# Patient Record
Sex: FEMALE | Race: WHITE | Employment: FULL TIME | URBAN - METROPOLITAN AREA
[De-identification: names, ages, dates, MRNs, and addresses within clinical notes are randomized per-mention and may not be internally consistent; named-entity substitution may affect disease eponyms.]

---

## 2018-06-18 ENCOUNTER — NURSE TRIAGE (OUTPATIENT)
Dept: NURSING | Facility: CLINIC | Age: 28
End: 2018-06-18

## 2018-06-18 ENCOUNTER — PRENATAL OFFICE VISIT (OUTPATIENT)
Dept: OBGYN | Facility: CLINIC | Age: 28
End: 2018-06-18

## 2018-06-18 ENCOUNTER — HOSPITAL ENCOUNTER (EMERGENCY)
Facility: CLINIC | Age: 28
Discharge: HOME | End: 2018-06-19
Attending: FAMILY MEDICINE | Admitting: FAMILY MEDICINE
Payer: COMMERCIAL

## 2018-06-18 ENCOUNTER — APPOINTMENT (OUTPATIENT)
Dept: ULTRASOUND IMAGING | Facility: CLINIC | Age: 28
End: 2018-06-18
Attending: FAMILY MEDICINE
Payer: COMMERCIAL

## 2018-06-18 ENCOUNTER — APPOINTMENT (OUTPATIENT)
Dept: OBGYN | Facility: CLINIC | Age: 28
End: 2018-06-18
Payer: COMMERCIAL

## 2018-06-18 VITALS
SYSTOLIC BLOOD PRESSURE: 128 MMHG | DIASTOLIC BLOOD PRESSURE: 80 MMHG | TEMPERATURE: 97.8 F | RESPIRATION RATE: 18 BRPM | HEART RATE: 88 BPM | OXYGEN SATURATION: 100 %

## 2018-06-18 DIAGNOSIS — O03.9 SPONTANEOUS ABORTION: ICD-10-CM

## 2018-06-18 DIAGNOSIS — Z34.00 PRENATAL CARE, FIRST PREGNANCY: Primary | ICD-10-CM

## 2018-06-18 LAB
ABO + RH BLD: NORMAL
ABO + RH BLD: NORMAL
ANION GAP SERPL CALCULATED.3IONS-SCNC: 7 MMOL/L (ref 3–14)
B-HCG SERPL-ACNC: 91 IU/L (ref 0–5)
BASOPHILS # BLD AUTO: 0 10E9/L (ref 0–0.2)
BASOPHILS NFR BLD AUTO: 0.5 %
BUN SERPL-MCNC: 14 MG/DL (ref 7–30)
CALCIUM SERPL-MCNC: 8.9 MG/DL (ref 8.5–10.1)
CHLORIDE SERPL-SCNC: 105 MMOL/L (ref 94–109)
CO2 SERPL-SCNC: 27 MMOL/L (ref 20–32)
CREAT SERPL-MCNC: 0.83 MG/DL (ref 0.52–1.04)
DIFFERENTIAL METHOD BLD: NORMAL
EOSINOPHIL # BLD AUTO: 0.3 10E9/L (ref 0–0.7)
EOSINOPHIL NFR BLD AUTO: 3.5 %
ERYTHROCYTE [DISTWIDTH] IN BLOOD BY AUTOMATED COUNT: 11.8 % (ref 10–15)
GFR SERPL CREATININE-BSD FRML MDRD: 83 ML/MIN/1.7M2
GLUCOSE SERPL-MCNC: 96 MG/DL (ref 70–99)
HCT VFR BLD AUTO: 41.8 % (ref 35–47)
HGB BLD-MCNC: 14 G/DL (ref 11.7–15.7)
IMM GRANULOCYTES # BLD: 0 10E9/L (ref 0–0.4)
IMM GRANULOCYTES NFR BLD: 0.1 %
LYMPHOCYTES # BLD AUTO: 2.7 10E9/L (ref 0.8–5.3)
LYMPHOCYTES NFR BLD AUTO: 34.5 %
MCH RBC QN AUTO: 31.3 PG (ref 26.5–33)
MCHC RBC AUTO-ENTMCNC: 33.5 G/DL (ref 31.5–36.5)
MCV RBC AUTO: 94 FL (ref 78–100)
MONOCYTES # BLD AUTO: 0.7 10E9/L (ref 0–1.3)
MONOCYTES NFR BLD AUTO: 9.3 %
NEUTROPHILS # BLD AUTO: 4.1 10E9/L (ref 1.6–8.3)
NEUTROPHILS NFR BLD AUTO: 52.1 %
NRBC # BLD AUTO: 0 10*3/UL
NRBC BLD AUTO-RTO: 0 /100
PLATELET # BLD AUTO: 287 10E9/L (ref 150–450)
POTASSIUM SERPL-SCNC: 3.5 MMOL/L (ref 3.4–5.3)
RBC # BLD AUTO: 4.47 10E12/L (ref 3.8–5.2)
SODIUM SERPL-SCNC: 139 MMOL/L (ref 133–144)
SPECIMEN EXP DATE BLD: NORMAL
WBC # BLD AUTO: 8 10E9/L (ref 4–11)

## 2018-06-18 PROCEDURE — 86901 BLOOD TYPING SEROLOGIC RH(D): CPT | Performed by: FAMILY MEDICINE

## 2018-06-18 PROCEDURE — 86900 BLOOD TYPING SEROLOGIC ABO: CPT | Performed by: FAMILY MEDICINE

## 2018-06-18 PROCEDURE — 99284 EMERGENCY DEPT VISIT MOD MDM: CPT | Mod: 25 | Performed by: FAMILY MEDICINE

## 2018-06-18 PROCEDURE — 85025 COMPLETE CBC W/AUTO DIFF WBC: CPT | Performed by: FAMILY MEDICINE

## 2018-06-18 PROCEDURE — 84702 CHORIONIC GONADOTROPIN TEST: CPT | Performed by: EMERGENCY MEDICINE

## 2018-06-18 PROCEDURE — 76801 OB US < 14 WKS SINGLE FETUS: CPT

## 2018-06-18 PROCEDURE — 99284 EMERGENCY DEPT VISIT MOD MDM: CPT | Mod: Z6 | Performed by: FAMILY MEDICINE

## 2018-06-18 PROCEDURE — 99207 ZZC NO CHARGE NURSE ONLY: CPT | Performed by: OBSTETRICS & GYNECOLOGY

## 2018-06-18 PROCEDURE — 80048 BASIC METABOLIC PNL TOTAL CA: CPT | Performed by: FAMILY MEDICINE

## 2018-06-18 RX ORDER — PRENATAL VIT/IRON FUM/FOLIC AC 27MG-0.8MG
1 TABLET ORAL DAILY
COMMUNITY
Start: 2018-04-18 | End: 2021-06-09

## 2018-06-18 NOTE — ED AVS SNAPSHOT
[facility] Emergency Department    [address] [facility] [address]    Phone:  [phone]    Fax:  [phone]                                       Allie Ramos   MRN: [id number]    Department:  [facility] Emergency Department   Date of Visit:  6/18/2018           After Visit Summary Signature Page     I have received my discharge instructions, and my questions have been answered. I have discussed any challenges I see with this plan with the nurse or doctor.    ..........................................................................................................................................  Patient/Patient Representative Signature      ..........................................................................................................................................  Patient Representative Print Name and Relationship to Patient    ..................................................               ................................................  Date                                            Time    ..........................................................................................................................................  Reviewed by Signature/Title    ...................................................              ..............................................  Date                                                            Time

## 2018-06-18 NOTE — MR AVS SNAPSHOT
"              After Visit Summary   6/18/2018    Allie Ramos    MRN: [id number]           Patient Information     Date Of Birth          1990        Visit Information        Provider Department      6/18/2018 5:18 PM Dilma Finney MD [facility] Emergency Department        Today's Diagnoses     Prenatal care, first pregnancy    -  1       Follow-ups after your visit        Your next 10 appointments already scheduled     Jul 12, 2018  1:00 PM CDT   New Prenatal with Dilma Finney MD, [facility] 2   [facility] ([facility])    [address] [facility] [address]   [phone]              Who to contact     If you have questions or need follow up information about today's clinic visit or your schedule please contact [facility] directly at [phone].  Normal or non-critical lab and imaging results will be communicated to you by MyChart, letter or phone within 4 business days after the clinic has received the results. If you do not hear from us within 7 days, please contact the clinic through MyChart or phone. If you have a critical or abnormal lab result, we will notify you by phone as soon as possible.  Submit refill requests through [employer] or call your pharmacy and they will forward the refill request to us. Please allow 3 business days for your refill to be completed.          Additional Information About Your Visit        MyChart Information     [employer] lets you send messages to your doctor, view your test results, renew your prescriptions, schedule appointments and more. To sign up, go to [email or web address] . Click on \"Log in\" on the left side of the screen, which will take you to the Welcome page. Then click on \"Sign up Now\" on the right side of the page.     You will be asked to enter the access code listed below, as well as some personal information. Please follow the directions to create your username and password.     Your access " code is: [email or web address]  Expires: 2018  5:30 PM     Your access code will  in 90 days. If you need help or a new code, please call your [place] clinic or [phone].        Care EveryWhere ID     This is your Care EveryWhere ID. This could be used by other organizations to access your [place] medical records  [id number]        Your Vitals Were     Last Period                   2018            Blood Pressure from Last 3 Encounters:   No data found for BP    Weight from Last 3 Encounters:   No data found for Wt              Today, you had the following     No orders found for display       Primary Care Provider Office Phone # Fax #    [facility] [phone] [address] [facility] [address]        Equal Access to Services     EDWIN DEL CID : Hadii nico caraballo hadasho Soomaali, waaxda luqadaha, qaybta kaalmada adeegyada, nettie irce hayangel howell . So [facility] [phone].    ATENCIÓN: Si habla español, tiene a carvajal disposición servicios gratuitos de asistencia lingüística. Llame al [phone].    We comply with applicable federal civil rights laws and [place] laws. We do not discriminate on the basis of race, color, national origin, age, disability, sex, sexual orientation, or gender identity.            Thank you!     Thank you for choosing [facility]  for your care. Our goal is always to provide you with excellent care. Hearing back from our patients is one way we can continue to improve our services. Please take a few minutes to complete the written survey that you may receive in the mail after your visit with us. Thank you!             Your Updated Medication List - Protect others around you: Learn how to safely use, store and throw away your medicines at www.disposemymeds.org.          This list is accurate as of 18  5:30 PM.  Always use your most recent med list.                   Brand Name Dispense Instructions for use  Diagnosis    prenatal multivitamin plus iron 27-0.8 MG Tabs per tablet      Take 1 tablet by mouth daily

## 2018-06-18 NOTE — ED AVS SNAPSHOT
[facility] Emergency Department    [address] [address]    Phone:  [phone]    Fax:  [phone]                                       Allie Ramos   MRN: [id number]    Department:  [facility] Emergency Department   Date of Visit:  2018           Patient Information     Date Of Birth          1990        Your diagnoses for this visit were:     Spontaneous         You were seen by Oscar Christianson MD.      Follow-up Information     Follow up with Clinic, [facility]. Schedule an appointment as soon as possible for a visit in 2 days.    Contact information:    [address] [facility] [address] [phone]          Discharge Instructions         Understanding Miscarriage: Emotions  Miscarriage is the unplanned end of a pregnancy that happens before you reach 20 weeks. When a miscarriage happens, you re likely to have a wide range of feelings. Allow yourself to accept how you feel. Only then can you begin to move on.    No one is to blame  Know that you did not cause this to happen. Miscarriage is very common. There is a 15% chance of miscarriage with each pregnancy (after pregnancy has been diagnosed). Miscarriage usually takes place during the first 10 weeks after conception.  Grief takes many forms  Grief may be the first thing you feel, or it may come upon you later. Perhaps you ll grieve because the future you hoped for is lost. Grief is painful and often lonely. But your miscarriage should become easier to deal with over time.  What you feel is OK  No one can tell you how to respond to your miscarriage. If you have been trying to have a child, this loss may feel overwhelming. Perhaps this was an unplanned pregnancy. That doesn t mean you won t feel loss. You know yourself best. It s OK to feel whatever you feel.  A sense of loss  No matter what you thought about being pregnant, having a miscarriage may cause a sense of loss. You may feel  as if something is missing. It s OK if you can t describe how you feel. At first, it may be enough just to look inside yourself and feel your emotions.  Partner s note  Men grieve, too. You may be feeling sad, helpless or frustrated. When you re struggling with your own feelings, knowing how to help your partner may be hard. But do your best to provide support. The following tips may also help:    Be kind to yourself and your partner.    Spend time together.    Fix a meal or bring dinner home for her.    Rent a movie.    If you have children, spend extra time with them.   Date Last Reviewed: 6/1/2016 2000-2017 [employer]. [address] [place] [address], [place] [address]. All rights reserved. This information is not intended as a substitute for professional medical care. Always follow your healthcare professional's instructions.          Understanding Miscarriage: During a Miscarriage  No two miscarriages are alike. Because of this, your healthcare provider will talk with you about the most appropriate treatment for you. If you re in good health and early in the pregnancy, your body may expel all the pregnancy tissue on its own. If your body doesn t expel all the tissue, your healthcare provider may recommend treatment to prevent infection and severe bleeding (hemorrhaging).  What happens during miscarriage  Some miscarriages happen without any signs or symptoms. Most miscarriages, however, start with bleeding and cramping, which may increase over time. The cramps may get very strong. This is normal when a miscarriage is happening. Cramping widens the passage (cervix) that tissue from the uterus must pass through to leave your body. Your healthcare provider may ask you for a sample of the tissue for lab testing. This is to make sure that the cells being shed from your body are normal.  Diagnosis  To confirm the miscarriage, your healthcare provider will do a pelvic exam. Your healthcare provider might  order a blood test to measure the levels of a pregnancy hormone (hCG).  He or she may also have you get an ultrasound test to find out if all of the tissue has passed from the uterus. If a miscarriage happens very early in the pregnancy, an ultrasound is not needed.  Treatment  If any tissue remains in the uterus, your healthcare provider may suggest the following measures depending on your particular situation:    Medicine. This is prescribed for you to take at home. The medicine causes the uterus to expel any remaining tissue. Take the medicine exactly as directed.    Dilation and curettage (D & C). This procedure is done in your healthcare provider s office or at the hospital. You are given medicine to prevent pain or to allow you to relax or sleep during the procedure. The healthcare provider uses instruments to widen the cervix (dilate). Tissue and blood that line the uterus are then removed (curettage).  Be sure you talk to your healthcare provider about the risks and benefits of these treatments.  If you have Rh-negative blood  If your blood is Rh negative, you may need treatment with Rho(D) immune globulin. This injection prevents substances in your blood from attacking the baby s blood during a future pregnancy. Your healthcare provider can tell you more.   Follow-up care  Keep all follow-up appointments. These are needed to make sure that you are healing well. During these visits, mention if you re feeling very sad or depressed. Your healthcare provider can suggest counseling or other resources to help you.  When to seek medical care  Contact your healthcare provider if you have any of the following:    Severe pain in the stomach, pelvis, or low back    Vaginal discharge that has a bad odor    Bleeding that soaks a new sanitary pad each hour    Fever of 100.4 F (38 C) or higher, or as directed by your healthcare provider   Date Last Reviewed: 6/1/2016 2000-2017 [employer]. [address] [place] [address]  [place] [address]. All rights reserved. This information is not intended as a substitute for professional medical care. Always follow your healthcare professional's instructions.          Understanding Miscarriage: Possible Causes  Miscarriage is common, but finding its cause may not be easy. If a cause can be found, it s likely to be a problem with the baby or the structure of the uterus. Other factors cause miscarriage, but they are less common.  Problems with the baby  Either of the following problems with the baby can cause a miscarriage:    There is a problem with the baby s chromosomes (genes that carry the information needed for life).    Birth defects  Problems with the uterus or cervix  Any of the following problems with the uterus or cervix can cause a miscarriage:    The uterus may be divided (have a septum), or have fibroids or adhesions.    The lining of the uterus may be too thin for the fertilized egg to implant.    The cervix may be too weak to support the weight of a pregnancy.  Other factors  Any of the following problems can cause a miscarriage:    A serious illness, such as uncontrolled diabetes mellitus.     A bad injury, perhaps during a car accident.    Exposure to toxins or radiation.  What does not cause miscarriage  Plenty of myths and  old wives  tales  try to explain the cause of miscarriage. But they are fiction--not fact. None of the following activities causes miscarriage:    Carrying groceries    Lifting a small child    Wearing high heels    Coloring your hair    Having sex    Vacuuming   Working outside the home    Being a vegetarian    Eating spicy foods    Having a Pap smear    Riding a horse or a bicycle    Wishing away or denying a pregnancy       Date Last Reviewed: 6/1/2016 2000-2017 [employer]. [address] [place] [address]. All rights reserved. This information is not intended as a substitute for professional medical care. Always  follow your healthcare professional's instructions.      Repeat hCG on blood in 48 hours with your primary care provider.  Return to the emergency department if worse or changes.      Your next 10 appointments already scheduled     Jul 12, 2018  1:00 PM CDT   New Prenatal with Dilma Finney MD, [facility]   [facility]    [address] [facility] [address]   [phone]              24 Hour Appointment Hotline       To make an appointment at any [facility], call [phone] ([phone]). If you don't have a family doctor or clinic, we will help you find one. [facility] are conveniently located to serve the needs of you and your family.             Review of your medicines      Our records show that you are taking the medicines listed below. If these are incorrect, please call your family doctor or clinic.        Dose / Directions Last dose taken    prenatal multivitamin plus iron 27-0.8 MG Tabs per tablet   Dose:  1 tablet        Take 1 tablet by mouth daily   Refills:  0                Procedures and tests performed during your visit     ABO and Rh    Basic metabolic panel    CBC with platelets differential    HCG quantitative pregnancy    US OB < 14 Weeks w Transvaginal      Orders Needing Specimen Collection     Ordered          06/18/18 2248  UA reflex to Microscopic - STAT, Prio: STAT, Needs to be Collected     Scheduled Task Status   06/18/18 2248 Collect UA reflex to Microscopic Open   Order Class:  PCU Collect                06/18/18 2248  Urine Culture Aerobic Bacterial - ROUTINE, Prio: Routine, Needs to be Collected     Scheduled Task Status   06/18/18 2248 Collect Urine Culture Aerobic Bacterial Open   Order Class:  PCU Collect                  Pending Results     Date and Time Order Name Status Description    6/18/2018 2248  OB < 14 Weeks w Transvaginal Preliminary             Pending Culture Results     No orders found  for last 3 day(s).            Pending Results Instructions     If you had any lab results that were not finalized at the time of your Discharge, you can call the ED Lab Result RN at [phone]. You will be contacted by this team for any positive Lab results or changes in treatment. The nurses are available 7 days a week from 10A to 6:30P.  You can leave a message 24 hours per day and they will return your call.        Test Results From Your Hospital Stay        6/18/2018  9:29 PM      Component Results     Component Value Ref Range & Units Status    HCG Quantitative Serum 91 (H) 0 - 5 IU/L Final         6/18/2018 11:05 PM      Component Results     Component Value Ref Range & Units Status    WBC 8.0 4.0 - 11.0 10e9/L Final    RBC Count 4.47 3.8 - 5.2 10e12/L Final    Hemoglobin 14.0 11.7 - 15.7 g/dL Final    Hematocrit 41.8 35.0 - 47.0 % Final    MCV 94 78 - 100 fl Final    MCH 31.3 26.5 - 33.0 pg Final    MCHC 33.5 31.5 - 36.5 g/dL Final    RDW 11.8 10.0 - 15.0 % Final    Platelet Count 287 150 - 450 10e9/L Final    Diff Method Automated Method  Final    % Neutrophils 52.1 % Final    % Lymphocytes 34.5 % Final    % Monocytes 9.3 % Final    % Eosinophils 3.5 % Final    % Basophils 0.5 % Final    % Immature Granulocytes 0.1 % Final    Nucleated RBCs 0 0 /100 Final    Absolute Neutrophil 4.1 1.6 - 8.3 10e9/L Final    Absolute Lymphocytes 2.7 0.8 - 5.3 10e9/L Final    Absolute Monocytes 0.7 0.0 - 1.3 10e9/L Final    Absolute Eosinophils 0.3 0.0 - 0.7 10e9/L Final    Absolute Basophils 0.0 0.0 - 0.2 10e9/L Final    Abs Immature Granulocytes 0.0 0 - 0.4 10e9/L Final    Absolute Nucleated RBC 0.0  Final         6/18/2018 11:16 PM      Component Results     Component Value Ref Range & Units Status    Sodium 139 133 - 144 mmol/L Final    Potassium 3.5 3.4 - 5.3 mmol/L Final    Chloride 105 94 - 109 mmol/L Final    Carbon Dioxide 27 20 - 32 mmol/L Final    Anion Gap 7 3 - 14 mmol/L Final    Glucose 96 70 - 99 mg/dL Final  "   Urea Nitrogen 14 7 - 30 mg/dL Final    Creatinine 0.83 0.52 - 1.04 mg/dL Final    GFR Estimate 83 >60 mL/min/1.7m2 Final    Non  GFR Calc    GFR Estimate If Black >90 >60 mL/min/1.7m2 Final    African American GFR Calc    Calcium 8.9 8.5 - 10.1 mg/dL Final               6/18/2018 11:44 PM      Component Results     Component    ABO    A    RH(D)    Pos    Specimen Expires    06/21/2018 6/19/2018 12:15 AM      Narrative     US OB <14 WEEKS WITH TRANSVAGINAL SINGLE  6/18/2018 11:32 PM      HISTORY: Pregnant with vaginal bleeding and cramping.     COMPARISON: None.    FINDINGS: Transabdominal and transvaginal imaging was performed.  Transvaginal imaging was performed to better visualize the endometrium  and adnexa. There is no evidence of an intrauterine pregnancy. The  endometrium measures 0.5 cm in thickness. The ovaries appear normal  bilaterally. No adnexal mass. Trace amount of free pelvic fluid.        Impression     IMPRESSION: There is no evidence of pregnancy. Ectopic pregnancy is  not ruled out.                Thank you for choosing [facility]       Thank you for choosing [facility] for your care. Our goal is always to provide you with excellent care. Hearing back from our patients is one way we can continue to improve our services. Please take a few minutes to complete the written survey that you may receive in the mail after you visit with us. Thank you!        [employer] Information     [employer] lets you send messages to your doctor, view your test results, renew your prescriptions, schedule appointments and more. To sign up, go to [email or web address] . Click on \"Log in\" on the left side of the screen, which will take you to the Welcome page. Then click on \"Sign up Now\" on the right side of the page.     You will be asked to enter the access code listed below, as well as some personal information. Please follow the directions to create your username and password.     Your access " code is: [email or web address]  Expires: 2018  5:30 PM     Your access code will  in 90 days. If you need help or a new code, please call your [place] clinic or [phone].        Care EveryWhere ID     This is your Care EveryWhere ID. This could be used by other organizations to access your [place] medical records  [id number]        Equal Access to Services     [facility] : Hadii nico araujoo Sofrank, waaxda luqadaha, qaybta kaalmada adedavid, nettie howell . So [facility] [phone].    ATENCIÓN: Si habla thiagoañol, tiene a carvajal disposición servicios gratuitos de asistencia lingüística. Llame al [phone].    We comply with applicable federal civil rights laws and [place] laws. We do not discriminate on the basis of race, color, national origin, age, disability, sex, sexual orientation, or gender identity.            After Visit Summary       This is your record. Keep this with you and show to your community pharmacist(s) and doctor(s) at your next visit.

## 2018-06-19 NOTE — TELEPHONE ENCOUNTER
Patient reports she has her first prenatal visit scheduled on 7-12-18. She now has vaginal bleeding and cramps. Rates her abdominal pain 4/10. Denies that she is changing a pad per hour. Said most of her bleeding goes into the toilet when she's urinating. Has not passed any tissue. No shoulder pain. Denies feeling faint or dizzy. Reports her abdominal pain has been constant since 6PM.    Protocol and care advice reviewed.   Caller states understanding of the recommended disposition.  Advised to call back if further questions or concerns.     Mary Ireland RN/FNA      Reason for Disposition    [1] Constant abdominal pain AND [2] present > 2 hours    Additional Information    Negative: Shock suspected (e.g., cold/pale/clammy skin, too weak to stand, low BP, rapid pulse)    Negative: Difficult to awaken or acting confused  (e.g., disoriented, slurred speech)    Negative: Passed out (i.e., lost consciousness, collapsed and was not responding)    Negative: Sounds like a life-threatening emergency to the triager    Negative: [1] Vaginal bleeding AND [2] pregnant > 20 weeks    Negative: Not pregnant or pregnancy status unknown    Negative: SEVERE abdominal pain    Negative: [1] SEVERE vaginal bleeding (i.e., soaking 2 pads / hour, large blood clots) AND [2] present 2 or more hours    Negative: [1] MODERATE vaginal bleeding (i.e., soaking 1 pad / hour; clots) AND [2] present > 6 hours    Negative: [1] MODERATE vaginal bleeding (i.e., soaking 1 pad / hour; clots) AND [2] pregnant > 12 weeks    Negative: Passed tissue (e.g., gray-white)    Negative: Shoulder pain    Negative: Pale skin (pallor) of new onset or worsening    Negative: Patient sounds very sick or weak to the triager    Protocols used: PREGNANCY - VAGINAL BLEEDING LESS THAN 20 WEEKS EGA-ADULT-

## 2018-06-19 NOTE — ED PROVIDER NOTES
History     Chief Complaint   Patient presents with     Vaginal Bleeding     vaginal bleeding and cramping, about 5 weeks pregnant     HPI  Allie Ramos is a 27 year old female,   5 week gestation past history significant for, febrile seizure, presents the emergency one-week vaginal spotting, brownish vaginal discharge, increased bleeding beginning today around 7:00 AM.  She notices vaginal bleeding similar to what she would have with a period as well as cramping in the suprapubic area.  No abdominal trauma or injury recently.  Confirmation of pregnancy by home pregnancy test, she is scheduled to see her provider until approximately 9 week estimated gestation.  No ultrasound this pregnancy.  She denies urinary tract symptoms such as frequency urgency or dysuria.  No hematuria.  No recent change in bowel habit and denies specifically constipation or diarrhea.      Problem List:    Patient Active Problem List    Diagnosis Date Noted     Prenatal care, first pregnancy 2018     Priority: Medium        Past Medical History:    Past Medical History:   Diagnosis Date     Chickenpox      Febrile seizure (H)        Past Surgical History:    Past Surgical History:   Procedure Laterality Date     EYE SURGERY      left eye     MOUTH SURGERY      wisdom teeth     TONSILLECTOMY & ADENOIDECTOMY      age 8       Family History:    Family History   Problem Relation Age of Onset     DIABETES Maternal Grandmother      Heart Surgery Maternal Grandmother      Other - See Comments Maternal Grandfather      CJD ?       Social History:  Marital Status:   [2]  Social History   Substance Use Topics     Smoking status: Never Smoker     Smokeless tobacco: Never Used     Alcohol use Yes      Comment: occas- quit with pregnancy        Medications:      Prenatal Vit-Fe Fumarate-FA (PRENATAL MULTIVITAMIN PLUS IRON) 27-0.8 MG TABS per tablet         Review of Systems   All other systems reviewed and are negative.      Physical  Exam   BP: 132/88  Pulse: 88  Heart Rate: 80  Temp: 97.8  F (36.6  C)  Resp: 18  SpO2: 100 %      Physical Exam   Constitutional: She is oriented to person, place, and time. She appears well-developed and well-nourished.   HENT:   Head: Normocephalic and atraumatic.   Right Ear: External ear normal.   Left Ear: External ear normal.   Nose: Nose normal.   Mouth/Throat: Oropharynx is clear and moist.   Eyes: Conjunctivae and EOM are normal. Pupils are equal, round, and reactive to light.   Neck: Normal range of motion. Neck supple.   Cardiovascular: Normal rate, regular rhythm, normal heart sounds and intact distal pulses.    Pulmonary/Chest: Effort normal and breath sounds normal.   Abdominal: Soft. Bowel sounds are normal.   Musculoskeletal: Normal range of motion.   Neurological: She is alert and oriented to person, place, and time.   Skin: Skin is warm and dry.   Psychiatric: She has a normal mood and affect. Her behavior is normal.   Nursing note and vitals reviewed.      ED Course     ED Course     Procedures               Critical Care time:  none               Results for orders placed or performed during the hospital encounter of 06/18/18 (from the past 24 hour(s))   HCG quantitative pregnancy   Result Value Ref Range    HCG Quantitative Serum 91 (H) 0 - 5 IU/L   CBC with platelets differential   Result Value Ref Range    WBC 8.0 4.0 - 11.0 10e9/L    RBC Count 4.47 3.8 - 5.2 10e12/L    Hemoglobin 14.0 11.7 - 15.7 g/dL    Hematocrit 41.8 35.0 - 47.0 %    MCV 94 78 - 100 fl    MCH 31.3 26.5 - 33.0 pg    MCHC 33.5 31.5 - 36.5 g/dL    RDW 11.8 10.0 - 15.0 %    Platelet Count 287 150 - 450 10e9/L    Diff Method Automated Method     % Neutrophils 52.1 %    % Lymphocytes 34.5 %    % Monocytes 9.3 %    % Eosinophils 3.5 %    % Basophils 0.5 %    % Immature Granulocytes 0.1 %    Nucleated RBCs 0 0 /100    Absolute Neutrophil 4.1 1.6 - 8.3 10e9/L    Absolute Lymphocytes 2.7 0.8 - 5.3 10e9/L    Absolute Monocytes 0.7  0.0 - 1.3 10e9/L    Absolute Eosinophils 0.3 0.0 - 0.7 10e9/L    Absolute Basophils 0.0 0.0 - 0.2 10e9/L    Abs Immature Granulocytes 0.0 0 - 0.4 10e9/L    Absolute Nucleated RBC 0.0    Basic metabolic panel   Result Value Ref Range    Sodium 139 133 - 144 mmol/L    Potassium 3.5 3.4 - 5.3 mmol/L    Chloride 105 94 - 109 mmol/L    Carbon Dioxide 27 20 - 32 mmol/L    Anion Gap 7 3 - 14 mmol/L    Glucose 96 70 - 99 mg/dL    Urea Nitrogen 14 7 - 30 mg/dL    Creatinine 0.83 0.52 - 1.04 mg/dL    GFR Estimate 83 >60 mL/min/1.7m2    GFR Estimate If Black >90 >60 mL/min/1.7m2    Calcium 8.9 8.5 - 10.1 mg/dL   ABO and Rh   Result Value Ref Range    ABO A     RH(D) Pos     Specimen Expires 2018    US OB < 14 Weeks w Transvaginal    Narrative    US OB <14 WEEKS WITH TRANSVAGINAL SINGLE  2018 11:32 PM      HISTORY: Pregnant with vaginal bleeding and cramping.     COMPARISON: None.    FINDINGS: Transabdominal and transvaginal imaging was performed.  Transvaginal imaging was performed to better visualize the endometrium  and adnexa. There is no evidence of an intrauterine pregnancy. The  endometrium measures 0.5 cm in thickness. The ovaries appear normal  bilaterally. No adnexal mass. Trace amount of free pelvic fluid.      Impression    IMPRESSION: There is no evidence of pregnancy. Ectopic pregnancy is  not ruled out.       Medications - No data to display  12:42 AM  Results reviewed with the patient and answered their questions.  I think it is quite likely that this is a spontaneous  and she has lost the pregnancy.  Low suspicion and possibility of ectopic.  I have recommended they follow-up with the primary care provider for repeat hCG on serum in 48 hours to confirm.  Return to the emergency department if worse or changes.  The news was delivered to them in a compassionate and empathetic fashion and expressed grief at their probable loss of pregnancy.  Assessments & Plan (with Medical Decision Making)    Assessment time stamp above with medical decision making.  Disposition to home.    Disclaimer: This note consists of symbols derived from keyboarding, dictation and/or voice recognition software. As a result, there may be errors in the script that have gone undetected. Please consider this when interpreting information found in this chart.      I have reviewed the nursing notes.    I have reviewed the findings, diagnosis, plan and need for follow up with the patient.          New Prescriptions    No medications on file       Final diagnoses:   Spontaneous        2018   [facility] EMERGENCY DEPARTMENT     Oscar Christianson MD  18 0044

## 2018-06-19 NOTE — ED NOTES
All D/C home info given and all questions answered. Pt and spouse verbalized understanding.    Called pt and left vm for pt to call back.

## 2018-06-19 NOTE — ED NOTES
Report received from KAREN Arguelles and all questions answered. I will assume care of PT at this time.       PT in US at this time.

## 2018-06-19 NOTE — DISCHARGE INSTRUCTIONS
Understanding Miscarriage: Emotions  Miscarriage is the unplanned end of a pregnancy that happens before you reach 20 weeks. When a miscarriage happens, you re likely to have a wide range of feelings. Allow yourself to accept how you feel. Only then can you begin to move on.    No one is to blame  Know that you did not cause this to happen. Miscarriage is very common. There is a 15% chance of miscarriage with each pregnancy (after pregnancy has been diagnosed). Miscarriage usually takes place during the first 10 weeks after conception.  Grief takes many forms  Grief may be the first thing you feel, or it may come upon you later. Perhaps you ll grieve because the future you hoped for is lost. Grief is painful and often lonely. But your miscarriage should become easier to deal with over time.  What you feel is OK  No one can tell you how to respond to your miscarriage. If you have been trying to have a child, this loss may feel overwhelming. Perhaps this was an unplanned pregnancy. That doesn t mean you won t feel loss. You know yourself best. It s OK to feel whatever you feel.  A sense of loss  No matter what you thought about being pregnant, having a miscarriage may cause a sense of loss. You may feel as if something is missing. It s OK if you can t describe how you feel. At first, it may be enough just to look inside yourself and feel your emotions.  Partner s note  Men grieve, too. You may be feeling sad, helpless or frustrated. When you re struggling with your own feelings, knowing how to help your partner may be hard. But do your best to provide support. The following tips may also help:    Be kind to yourself and your partner.    Spend time together.    Fix a meal or bring dinner home for her.    Rent a movie.    If you have children, spend extra time with them.   Date Last Reviewed: 6/1/2016 2000-2017 [employer]. [address], [place] [address]. All rights reserved. This  information is not intended as a substitute for professional medical care. Always follow your healthcare professional's instructions.          Understanding Miscarriage: During a Miscarriage  No two miscarriages are alike. Because of this, your healthcare provider will talk with you about the most appropriate treatment for you. If you re in good health and early in the pregnancy, your body may expel all the pregnancy tissue on its own. If your body doesn t expel all the tissue, your healthcare provider may recommend treatment to prevent infection and severe bleeding (hemorrhaging).  What happens during miscarriage  Some miscarriages happen without any signs or symptoms. Most miscarriages, however, start with bleeding and cramping, which may increase over time. The cramps may get very strong. This is normal when a miscarriage is happening. Cramping widens the passage (cervix) that tissue from the uterus must pass through to leave your body. Your healthcare provider may ask you for a sample of the tissue for lab testing. This is to make sure that the cells being shed from your body are normal.  Diagnosis  To confirm the miscarriage, your healthcare provider will do a pelvic exam. Your healthcare provider might order a blood test to measure the levels of a pregnancy hormone (hCG).  He or she may also have you get an ultrasound test to find out if all of the tissue has passed from the uterus. If a miscarriage happens very early in the pregnancy, an ultrasound is not needed.  Treatment  If any tissue remains in the uterus, your healthcare provider may suggest the following measures depending on your particular situation:    Medicine. This is prescribed for you to take at home. The medicine causes the uterus to expel any remaining tissue. Take the medicine exactly as directed.    Dilation and curettage (D & C). This procedure is done in your healthcare provider s office or at the hospital. You are given medicine to  prevent pain or to allow you to relax or sleep during the procedure. The healthcare provider uses instruments to widen the cervix (dilate). Tissue and blood that line the uterus are then removed (curettage).  Be sure you talk to your healthcare provider about the risks and benefits of these treatments.  If you have Rh-negative blood  If your blood is Rh negative, you may need treatment with Rho(D) immune globulin. This injection prevents substances in your blood from attacking the baby s blood during a future pregnancy. Your healthcare provider can tell you more.   Follow-up care  Keep all follow-up appointments. These are needed to make sure that you are healing well. During these visits, mention if you re feeling very sad or depressed. Your healthcare provider can suggest counseling or other resources to help you.  When to seek medical care  Contact your healthcare provider if you have any of the following:    Severe pain in the stomach, pelvis, or low back    Vaginal discharge that has a bad odor    Bleeding that soaks a new sanitary pad each hour    Fever of 100.4 F (38 C) or higher, or as directed by your healthcare provider   Date Last Reviewed: 6/1/2016 2000-2017 [employer]. [address] [place] [address]. All rights reserved. This information is not intended as a substitute for professional medical care. Always follow your healthcare professional's instructions.          Understanding Miscarriage: Possible Causes  Miscarriage is common, but finding its cause may not be easy. If a cause can be found, it s likely to be a problem with the baby or the structure of the uterus. Other factors cause miscarriage, but they are less common.  Problems with the baby  Either of the following problems with the baby can cause a miscarriage:    There is a problem with the baby s chromosomes (genes that carry the information needed for life).    Birth defects  Problems with the uterus or  cervix  Any of the following problems with the uterus or cervix can cause a miscarriage:    The uterus may be divided (have a septum), or have fibroids or adhesions.    The lining of the uterus may be too thin for the fertilized egg to implant.    The cervix may be too weak to support the weight of a pregnancy.  Other factors  Any of the following problems can cause a miscarriage:    A serious illness, such as uncontrolled diabetes mellitus.     A bad injury, perhaps during a car accident.    Exposure to toxins or radiation.  What does not cause miscarriage  Plenty of myths and  old wives  tales  try to explain the cause of miscarriage. But they are fiction--not fact. None of the following activities causes miscarriage:    Carrying groceries    Lifting a small child    Wearing high heels    Coloring your hair    Having sex    Vacuuming   Working outside the home    Being a vegetarian    Eating spicy foods    Having a Pap smear    Riding a horse or a bicycle    Wishing away or denying a pregnancy       Date Last Reviewed: 6/1/2016 2000-2017 [employer]. [address] [place] [address], [place] [id number]. All rights reserved. This information is not intended as a substitute for professional medical care. Always follow your healthcare professional's instructions.      Repeat hCG on blood in 48 hours with your primary care provider.  Return to the emergency department if worse or changes.

## 2018-06-19 NOTE — ED NOTES
+vaginal bleeding.  Pt 5 weeks pregnant.  .  Abd cramping present.  Pt states she has had rust colored d/c for approx 1 week, today it turned bright red.  Pt states she bleeds more when she sits down.  A&Ox4.

## 2018-06-19 NOTE — ED NOTES
PT back from US and placed on the monitor. PT is noted to be A&OX4, appears to be in NAD with no SOB noted. PT denies pain at this time. All needs are being assessed and will be met and all comfort measures are being addressed. Awaiting MD dispo.   PT aware of need for urine sample collection and will provide when she is able.

## 2018-06-20 ENCOUNTER — OFFICE VISIT (OUTPATIENT)
Dept: FAMILY MEDICINE | Facility: CLINIC | Age: 28
End: 2018-06-20
Payer: COMMERCIAL

## 2018-06-20 VITALS
SYSTOLIC BLOOD PRESSURE: 130 MMHG | DIASTOLIC BLOOD PRESSURE: 81 MMHG | WEIGHT: 131.2 LBS | HEART RATE: 89 BPM | TEMPERATURE: 99 F

## 2018-06-20 DIAGNOSIS — O03.9 SPONTANEOUS ABORTION: Primary | ICD-10-CM

## 2018-06-20 DIAGNOSIS — R76.8 HEPATITIS B ANTIBODY POSITIVE: ICD-10-CM

## 2018-06-20 LAB — B-HCG SERPL-ACNC: 16 IU/L (ref 0–5)

## 2018-06-20 PROCEDURE — 87340 HEPATITIS B SURFACE AG IA: CPT | Performed by: NURSE PRACTITIONER

## 2018-06-20 PROCEDURE — 84702 CHORIONIC GONADOTROPIN TEST: CPT | Performed by: NURSE PRACTITIONER

## 2018-06-20 PROCEDURE — 80076 HEPATIC FUNCTION PANEL: CPT | Performed by: NURSE PRACTITIONER

## 2018-06-20 PROCEDURE — 86706 HEP B SURFACE ANTIBODY: CPT | Performed by: NURSE PRACTITIONER

## 2018-06-20 PROCEDURE — 86704 HEP B CORE ANTIBODY TOTAL: CPT | Performed by: NURSE PRACTITIONER

## 2018-06-20 PROCEDURE — 99203 OFFICE O/P NEW LOW 30 MIN: CPT | Performed by: NURSE PRACTITIONER

## 2018-06-20 PROCEDURE — 36415 COLL VENOUS BLD VENIPUNCTURE: CPT | Performed by: NURSE PRACTITIONER

## 2018-06-20 NOTE — PROGRESS NOTES
SUBJECTIVE:   Allie Ramos is a 27 year old female who presents to clinic today for the following health issues:  The patient was about 5 weeks pregnant when she developed vaginal bleeding, she presented to ER had pelvic US done, no viable pregnancy, no yolk sac or other fetal tissues were seen. The patient reports that she is still having vaginal bleeding, denies passing blood clots, denies heavy bleeding. States pelvic cramping subsided and bleeding is lighter now. This is her first pregnancy. No history of prior miscarriages.  The patient did some blood screening through her work and found to positive hep B surface antibodies, no other hep B screening was done. She does not remember having hep B vaccination. Labs also showed slightly elevated liver enzymes: AST 44 and ALT level 66. She also had lipid panel done: LDL level was slightly elevated in low 100' with normal rest of the lipid panel.      ED/UC Followup:    Facility:  [facility]   Date of visit: 6/18  Reason for visit: vaginal bleeding, cramping   Current Status: Still having vaginal bleeding, cramping has subsided      Problem list and histories reviewed & adjusted, as indicated.  Additional history: as documented    Labs reviewed in EPIC    Reviewed and updated as needed this visit by clinical staff       Reviewed and updated as needed this visit by Provider         ROS:  Constitutional, HEENT, cardiovascular, pulmonary, gi and gu systems are negative, except as otherwise noted.    OBJECTIVE:     /81  Pulse 89  Temp 99  F (37.2  C) (Tympanic)  Wt 131 lb 3.2 oz (59.5 kg)  LMP 05/13/2018  There is no height or weight on file to calculate BMI.  GENERAL: healthy, alert and no distress  ABDOMEN: soft, nontender  PSYCH: mentation appears normal, affect normal/bright    Diagnostic Test Results:  Results for orders placed or performed in visit on 06/20/18 (from the past 24 hour(s))   HCG Quantitative Pregnancy, Blood ([id number])   Result Value Ref Range     HCG Quantitative Serum 16 (H) 0 - 5 IU/L       ASSESSMENT/PLAN:     1. Spontaneous   -she had complete miscarriage and HCG levels continue to drop. Bleeding is lighter now and she does not have any cramping pelvic pain anymore. Discussed possible causes of miscarriage, told patient that in about 65% of patient is due to chromosomal abnormalities. She will try another pregnancy in 2-3 months.  She did not have any problems to conceive and became pregnant in about 4-6 weeks after discontinuation of birth control. Possible recent use of birth control might be a reason of current pregnancy loss, I advise her to wait for 2-3 months before start trying again.    Transabdominal and transvaginal imaging was performed.  Transvaginal imaging was performed to better visualize the endometrium  and adnexa. There is no evidence of an intrauterine pregnancy. The  endometrium measures 0.5 cm in thickness. The ovaries appear normal  bilaterally. No adnexal mass. Trace amount of free pelvic fluid.  - HCG Quantitative Pregnancy, Blood ([id number])    2. Hepatitis B antibody positive  -she had positive anti-HBs antibodies, no other hep B serology was done. I told patient that we need additional evaluation, as positive HBs might be due to immunity from vaccination. She does not remember if she ever had hep B vaccination in the past.   - Hepatitis B surface antigen  - Hepatitis B core antibody  - Hepatitis B Surface Antibody  - Hepatic panel  - Hepatitis B core antibody IgM    See Patient Instructions    KARO Deal [facility]

## 2018-06-20 NOTE — PATIENT INSTRUCTIONS
HCG level     If continue to have bleeding beyond 2 weeks recommend to see OB, or ask for Misoprostol (medication that they use for treatment of ).      Positive hep B needs additional evaluation

## 2018-06-20 NOTE — MR AVS SNAPSHOT
"              After Visit Summary   2018    Allie aRmos    MRN: [id number]           Patient Information     Date Of Birth          1990        Visit Information        Provider Department      2018 3:20 PM Yi Celeste APRN CNP [facility]        Today's Diagnoses     Spontaneous     -  1    Hepatitis B antibody positive          Care Instructions    HCG level     If continue to have bleeding beyond 2 weeks recommend to see OB, or ask for Misoprostol (medication that they use for treatment of ).      Positive hep B needs additional evaluation                 Follow-ups after your visit        Who to contact     If you have questions or need follow up information about today's clinic visit or your schedule please contact [facility] directly at [phone].  Normal or non-critical lab and imaging results will be communicated to you by [employer], letter or phone within 4 business days after the clinic has received the results. If you do not hear from us within 7 days, please contact the clinic through MyChart or phone. If you have a critical or abnormal lab result, we will notify you by phone as soon as possible.  Submit refill requests through [employer] or call your pharmacy and they will forward the refill request to us. Please allow 3 business days for your refill to be completed.          Additional Information About Your Visit        [employer] Information     [employer] lets you send messages to your doctor, view your test results, renew your prescriptions, schedule appointments and more. To sign up, go to [email or web address] . Click on \"Log in\" on the left side of the screen, which will take you to the Welcome page. Then click on \"Sign up Now\" on the right side of the page.     You will be asked to enter the access code listed below, as well as some personal information. Please follow the directions to create your username and password.   "   Your access code is: [email or web address]  Expires: 2018  5:30 PM     Your access code will  in 90 days. If you need help or a new code, please call your [facility] clinic or [phone].        Care EveryWhere ID     This is your Care EveryWhere ID. This could be used by other organizations to access your [facility] medical records  [id number]        Your Vitals Were     Pulse Temperature Last Period             89 99  F (37.2  C) (Tympanic) 2018          Blood Pressure from Last 3 Encounters:   18 130/81   18 128/80    Weight from Last 3 Encounters:   18 131 lb 3.2 oz (59.5 kg)              We Performed the Following     HCG Quantitative Pregnancy, Blood ([id number])     Hepatitis B core antibody     Hepatitis B Surface Antibody     Hepatitis B surface antigen        Primary Care Provider Office Phone # Fax #    [facility] [phone] [address] [facility] [phone]        Equal Access to Services     EDWIN DEL CID : Hadii aad ku hadasho Soomaali, waaxda luqadaha, qaybta kaalmada adeegyada, waxay idiin hayluigin rosalino howell . So [facility] [phone].    ATENCIÓN: Si habla español, tiene a carvajal disposición servicios gratuitos de asistencia lingüística. Llame al [phone].    We comply with applicable federal civil rights laws and [place] laws. We do not discriminate on the basis of race, color, national origin, age, disability, sex, sexual orientation, or gender identity.            Thank you!     Thank you for choosing [facility]  for your care. Our goal is always to provide you with excellent care. Hearing back from our patients is one way we can continue to improve our services. Please take a few minutes to complete the written survey that you may receive in the mail after your visit with us. Thank you!             Your Updated Medication List - Protect others around you: Learn how to safely use, store and throw away your  medicines at www.disposemymeds.org.          This list is accurate as of 6/20/18  3:50 PM.  Always use your most recent med list.                   Brand Name Dispense Instructions for use Diagnosis    prenatal multivitamin plus iron 27-0.8 MG Tabs per tablet      Take 1 tablet by mouth daily

## 2018-06-21 PROBLEM — O03.9 SPONTANEOUS ABORTION: Status: ACTIVE | Noted: 2018-06-21

## 2018-06-21 LAB
ALBUMIN SERPL-MCNC: 4.6 G/DL (ref 3.4–5)
ALP SERPL-CCNC: 42 U/L (ref 40–150)
ALT SERPL W P-5'-P-CCNC: 43 U/L (ref 0–50)
AST SERPL W P-5'-P-CCNC: 25 U/L (ref 0–45)
BILIRUB DIRECT SERPL-MCNC: <0.1 MG/DL (ref 0–0.2)
BILIRUB SERPL-MCNC: 0.2 MG/DL (ref 0.2–1.3)
HBV CORE AB SERPL QL IA: NONREACTIVE
HBV SURFACE AB SERPL IA-ACNC: >1000 M[IU]/ML
HBV SURFACE AG SERPL QL IA: NONREACTIVE
PROT SERPL-MCNC: 7.6 G/DL (ref 6.8–8.8)

## 2018-06-23 LAB
ALBUMIN SERPL-MCNC: NORMAL G/DL (ref 3.4–5)
ALP SERPL-CCNC: NORMAL U/L (ref 40–150)
ALT SERPL W P-5'-P-CCNC: NORMAL U/L (ref 0–50)
AST SERPL W P-5'-P-CCNC: NORMAL U/L (ref 0–45)
BILIRUB DIRECT SERPL-MCNC: NORMAL MG/DL (ref 0–0.2)
BILIRUB SERPL-MCNC: NORMAL MG/DL (ref 0.2–1.3)
PROT SERPL-MCNC: NORMAL G/DL (ref 6.8–8.8)

## 2018-11-08 ENCOUNTER — TELEPHONE (OUTPATIENT)
Dept: OBGYN | Facility: CLINIC | Age: 28
End: 2018-11-08

## 2018-11-08 NOTE — TELEPHONE ENCOUNTER
"Patient sent a web request today:    \"Annual visit; I have an appt set for Friday 12/14 but I am supposed to have day 2 of my period. I don't know if Dr Orozco wants me to reschedule or if I can keep my original appt. I don't know if I'm due for a pap smear, but I am OK keeping my appt. If I need a new appt, I can make most afternoons with Dr Orozco work. Please let me know; thank you!\"    If patient needs to re-schedule, she would like same provider; same location; in the afternoon.    Please contact patient.    Thank you.    Central Scheduling  Aminta GILBERT.  "

## 2018-11-08 NOTE — TELEPHONE ENCOUNTER
Return call to patient.  Unable to reach.  Left message on identifiable voice mail for patient to return call to clinic with questions.  Patient should be able to keep appointment with lighter bleeding. If menses is heavy, recommend patient to reschedule.    Carol Rojas   Ob[facility]Gyn Clinic  RN

## 2018-12-14 ENCOUNTER — OFFICE VISIT (OUTPATIENT)
Dept: OBGYN | Facility: CLINIC | Age: 28
End: 2018-12-14
Payer: COMMERCIAL

## 2018-12-14 ENCOUNTER — NURSE TRIAGE (OUTPATIENT)
Dept: NURSING | Facility: CLINIC | Age: 28
End: 2018-12-14

## 2018-12-14 VITALS
SYSTOLIC BLOOD PRESSURE: 131 MMHG | BODY MASS INDEX: 19.25 KG/M2 | HEIGHT: 68 IN | RESPIRATION RATE: 16 BRPM | HEART RATE: 89 BPM | WEIGHT: 127 LBS | TEMPERATURE: 98.9 F | DIASTOLIC BLOOD PRESSURE: 71 MMHG

## 2018-12-14 DIAGNOSIS — Z34.90 EARLY STAGE OF PREGNANCY: ICD-10-CM

## 2018-12-14 DIAGNOSIS — Z01.419 ENCOUNTER FOR ANNUAL ROUTINE GYNECOLOGICAL EXAMINATION: Primary | ICD-10-CM

## 2018-12-14 LAB — B-HCG SERPL-ACNC: <1 IU/L (ref 0–5)

## 2018-12-14 PROCEDURE — 36415 COLL VENOUS BLD VENIPUNCTURE: CPT | Performed by: OBSTETRICS & GYNECOLOGY

## 2018-12-14 PROCEDURE — G0145 SCR C/V CYTO,THINLAYER,RESCR: HCPCS | Performed by: OBSTETRICS & GYNECOLOGY

## 2018-12-14 PROCEDURE — 84702 CHORIONIC GONADOTROPIN TEST: CPT | Performed by: OBSTETRICS & GYNECOLOGY

## 2018-12-14 PROCEDURE — 99395 PREV VISIT EST AGE 18-39: CPT | Performed by: OBSTETRICS & GYNECOLOGY

## 2018-12-14 ASSESSMENT — MIFFLIN-ST. JEOR: SCORE: 1356.63

## 2018-12-14 NOTE — TELEPHONE ENCOUNTER
Allie calling to request results from recent blood work.     FNA verified in patients chart that the results were still in process. Patient will call back Monday or monitor in my chart.     Khadijah Baig RN/FNA      Reason for Disposition    Caller requesting lab results    Protocols used: PCP CALL - NO TRIAGE-ADULT-

## 2018-12-14 NOTE — PATIENT INSTRUCTIONS
We will escort you down to lab to have your blood drawn.  Return to clinic annually for a GYN exam.     Preventive Health Recommendations  Female Ages 26 - 39  Yearly exam:   See your health care provider every year in order to    Review health changes.     Discuss preventive care.      Review your medicines if you your doctor has prescribed any.    Until age 30: Get a Pap test every three years (more often if you have had an abnormal result).    After age 30: Talk to your doctor about whether you should have a Pap test every 3 years or have a Pap test with HPV screening every 5 years.   You do not need a Pap test if your uterus was removed (hysterectomy) and you have not had cancer.  You should be tested each year for STDs (sexually transmitted diseases), if you're at risk.   Talk to your provider about how often to have your cholesterol checked.  If you are at risk for diabetes, you should have a diabetes test (fasting glucose).  Shots: Get a flu shot each year. Get a tetanus shot every 10 years.   Nutrition:     Eat at least 5 servings of fruits and vegetables each day.    Eat whole-grain bread, whole-wheat pasta and brown rice instead of white grains and rice.    Get adequate Calcium and Vitamin D.     Lifestyle    Exercise at least 150 minutes a week (30 minutes a day, 5 days of the week). This will help you control your weight and prevent disease.    Limit alcohol to one drink per day.    No smoking.     Wear sunscreen to prevent skin cancer.    See your dentist every six months for an exam and cleaning.

## 2018-12-14 NOTE — PROGRESS NOTES
SUBJECTIVE:   CC: Allie Ramos is an 27 year old woman who presents for preventive health visit. Is attempting for pregnancy. She is one week late on her period but has had 2x negative home UPTs.     Healthy Habits:    Do you get at least three servings of calcium containing foods daily (dairy, green leafy vegetables, etc.)? yes    Amount of exercise or daily activities, outside of work: 3 day(s) per week    Problems taking medications regularly No    Medication side effects: No    Have you had an eye exam in the past two years? no    Do you see a dentist twice per year? yes    Do you have sleep apnea, excessive snoring or daytime drowsiness?no      Today's PHQ-2 Score:   PHQ-2 ( 1999 Pfizer) 12/14/2018   Q1: Little interest or pleasure in doing things 0   Q2: Feeling down, depressed or hopeless 0   PHQ-2 Score 0       Abuse: Current or Past(Physical, Sexual or Emotional)- No  Do you feel safe in your environment? Yes    Social History     Tobacco Use     Smoking status: Never Smoker     Smokeless tobacco: Never Used   Substance Use Topics     Alcohol use: Yes     Comment: occas- quit with pregnancy     If you drink alcohol do you typically have >3 drinks per day or >7 drinks per week? No                     Reviewed orders with patient.  Reviewed health maintenance and updated orders accordingly - Yes  Labs reviewed in EPIC    Mammogram not appropriate for this patient based on age.    Pertinent mammograms are reviewed under the imaging tab.  History of abnormal Pap smear: NO - age 21-29 PAP every 3 years recommended     Reviewed and updated as needed this visit by clinical staff  Tobacco  Allergies         Reviewed and updated as needed this visit by Provider        Past Medical History:   Diagnosis Date     Chickenpox      Febrile seizure (H)     x1      Past Surgical History:   Procedure Laterality Date     EYE SURGERY      left eye     MOUTH SURGERY      wisdom teeth     TONSILLECTOMY & ADENOIDECTOMY       "age 8     Obstetric History       T0      L0     SAB0   TAB0   Ectopic0   Multiple0   Live Births0       # Outcome Date GA Lbr Dylan/2nd Weight Sex Delivery Anes PTL Lv   1                    ROS:  CONSTITUTIONAL: NEGATIVE for fever, chills, change in weight  INTEGUMENTARU/SKIN: NEGATIVE for worrisome rashes, moles or lesions  EYES: NEGATIVE for vision changes or irritation  ENT: NEGATIVE for ear, mouth and throat problems  RESP: NEGATIVE for significant cough or SOB  BREAST: NEGATIVE for masses, tenderness or discharge  CV: NEGATIVE for chest pain, palpitations or peripheral edema  GI: NEGATIVE for nausea, abdominal pain, heartburn, or change in bowel habits  : NEGATIVE for unusual urinary or vaginal symptoms. Periods are regular.  MUSCULOSKELETAL: NEGATIVE for significant arthralgias or myalgia  NEURO: NEGATIVE for weakness, dizziness or paresthesias  PSYCHIATRIC: NEGATIVE for changes in mood or affect    OBJECTIVE:   /71 (BP Location: Right arm, Patient Position: Chair, Cuff Size: Adult Regular)   Pulse 89   Temp 98.9  F (37.2  C) (Tympanic)   Resp 16   Ht 1.715 m (5' 7.5\")   Wt 57.6 kg (127 lb)   LMP 2018   Breastfeeding? Unknown   BMI 19.60 kg/m    EXAM:  GENERAL: healthy, alert and no distress  EYES: Eyes grossly normal to inspection, PERRL and conjunctivae and sclerae normal  HENT: ear canals and TM's normal, nose and mouth without ulcers or lesions  NECK: no adenopathy, no asymmetry, masses, or scars and thyroid normal to palpation  RESP: breathing comfortably on room air   BREAST: normal without masses, tenderness or nipple discharge and no palpable axillary masses or adenopathy  CV: regular rate, warm and well-perfused  ABDOMEN: soft, nontender, no hepatosplenomegaly, no masses  GYN: normal external genitalia, vagina and cervix, physiologic discharge, normal cervix without mass  MS: no gross musculoskeletal defects noted, no edema  SKIN: no suspicious lesions or " "rashes  NEURO: Normal strength and tone, mentation intact and speech normal  PSYCH: mentation appears normal, affect normal/bright    Diagnostic Test Results:  none     ASSESSMENT/PLAN:   1. Encounter for annual routine gynecological examination  - Pap imaged thin layer screen reflex to HPV if ASCUS - recommend age 25 - 29    2. Early stage of pregnancy  - HCG Quantitative Pregnancy, Blood (REE974)    COUNSELING:   Reviewed preventive health counseling, as reflected in patient instructions. Start PNVs given attempting pregnancy.     BP Readings from Last 1 Encounters:   12/14/18 131/71     Estimated body mass index is 19.6 kg/m  as calculated from the following:    Height as of this encounter: 1.715 m (5' 7.5\").    Weight as of this encounter: 57.6 kg (127 lb).           reports that  has never smoked. she has never used smokeless tobacco.      Counseling Resources:  ATP IV Guidelines  Pooled Cohorts Equation Calculator  Breast Cancer Risk Calculator  FRAX Risk Assessment  ICSI Preventive Guidelines  Dietary Guidelines for Americans, 2010  USDA's MyPlate  ASA Prophylaxis  Lung CA Screening    Kaylee Orozco MD  [facility]  "

## 2018-12-14 NOTE — NURSING NOTE
"Initial /71 (BP Location: Right arm, Patient Position: Chair, Cuff Size: Adult Regular)   Pulse 89   Temp 98.9  F (37.2  C) (Tympanic)   Resp 16   Ht 1.715 m (5' 7.5\")   Wt 57.6 kg (127 lb)   LMP 11/11/2018   Breastfeeding? Unknown   BMI 19.60 kg/m   Estimated body mass index is 19.6 kg/m  as calculated from the following:    Height as of this encounter: 1.715 m (5' 7.5\").    Weight as of this encounter: 57.6 kg (127 lb). .      "

## 2018-12-18 LAB
COPATH REPORT: NORMAL
PAP: NORMAL

## 2019-09-25 ENCOUNTER — OFFICE VISIT (OUTPATIENT)
Dept: OBGYN | Facility: CLINIC | Age: 29
End: 2019-09-25
Payer: COMMERCIAL

## 2019-09-25 VITALS
DIASTOLIC BLOOD PRESSURE: 81 MMHG | SYSTOLIC BLOOD PRESSURE: 124 MMHG | HEART RATE: 81 BPM | HEIGHT: 68 IN | BODY MASS INDEX: 18.91 KG/M2 | WEIGHT: 124.8 LBS | RESPIRATION RATE: 16 BRPM | TEMPERATURE: 98.2 F

## 2019-09-25 DIAGNOSIS — N97.9 FEMALE INFERTILITY: Primary | ICD-10-CM

## 2019-09-25 LAB
CHOLEST SERPL-MCNC: 203 MG/DL
ESTRADIOL SERPL-MCNC: 88 PG/ML
FSH SERPL-ACNC: 3.9 IU/L
HBA1C MFR BLD: 4.9 % (ref 0–5.6)
HDLC SERPL-MCNC: 102 MG/DL
LDLC SERPL CALC-MCNC: 82 MG/DL
LH SERPL-ACNC: 9.2 IU/L
NONHDLC SERPL-MCNC: 101 MG/DL
PROLACTIN SERPL-MCNC: 36 UG/L (ref 3–27)
TRIGL SERPL-MCNC: 96 MG/DL
TSH SERPL DL<=0.005 MIU/L-ACNC: 1.8 MU/L (ref 0.4–4)

## 2019-09-25 PROCEDURE — 84443 ASSAY THYROID STIM HORMONE: CPT | Performed by: OBSTETRICS & GYNECOLOGY

## 2019-09-25 PROCEDURE — 82670 ASSAY OF TOTAL ESTRADIOL: CPT | Performed by: OBSTETRICS & GYNECOLOGY

## 2019-09-25 PROCEDURE — 99214 OFFICE O/P EST MOD 30 MIN: CPT | Performed by: OBSTETRICS & GYNECOLOGY

## 2019-09-25 PROCEDURE — 83001 ASSAY OF GONADOTROPIN (FSH): CPT | Performed by: OBSTETRICS & GYNECOLOGY

## 2019-09-25 PROCEDURE — 82627 DEHYDROEPIANDROSTERONE: CPT | Performed by: OBSTETRICS & GYNECOLOGY

## 2019-09-25 PROCEDURE — 83002 ASSAY OF GONADOTROPIN (LH): CPT | Performed by: OBSTETRICS & GYNECOLOGY

## 2019-09-25 PROCEDURE — 84146 ASSAY OF PROLACTIN: CPT | Performed by: OBSTETRICS & GYNECOLOGY

## 2019-09-25 PROCEDURE — 36415 COLL VENOUS BLD VENIPUNCTURE: CPT | Performed by: OBSTETRICS & GYNECOLOGY

## 2019-09-25 PROCEDURE — 83520 IMMUNOASSAY QUANT NOS NONAB: CPT | Mod: 90 | Performed by: OBSTETRICS & GYNECOLOGY

## 2019-09-25 PROCEDURE — 83036 HEMOGLOBIN GLYCOSYLATED A1C: CPT | Performed by: OBSTETRICS & GYNECOLOGY

## 2019-09-25 PROCEDURE — 80061 LIPID PANEL: CPT | Performed by: OBSTETRICS & GYNECOLOGY

## 2019-09-25 PROCEDURE — 86762 RUBELLA ANTIBODY: CPT | Performed by: OBSTETRICS & GYNECOLOGY

## 2019-09-25 PROCEDURE — 84270 ASSAY OF SEX HORMONE GLOBUL: CPT | Performed by: OBSTETRICS & GYNECOLOGY

## 2019-09-25 PROCEDURE — 84403 ASSAY OF TOTAL TESTOSTERONE: CPT | Performed by: OBSTETRICS & GYNECOLOGY

## 2019-09-25 PROCEDURE — 99000 SPECIMEN HANDLING OFFICE-LAB: CPT | Performed by: OBSTETRICS & GYNECOLOGY

## 2019-09-25 PROCEDURE — 86787 VARICELLA-ZOSTER ANTIBODY: CPT | Performed by: OBSTETRICS & GYNECOLOGY

## 2019-09-25 ASSESSMENT — MIFFLIN-ST. JEOR: SCORE: 1341.65

## 2019-09-25 NOTE — PROGRESS NOTES
[facility]  OB/GYN Clinic    CC: Infertility    Subjective:  Allie Ramos is a 28 year old  with primary infertility of approximately since 2018. Got pregnant right away, miscarried in 2018. Trying every since, unable to get pregnant.   Cycle is 16-48 days, every since the miscarriage.     OB Hx:       Female infertility factors:  Ovulation   Menses: Patient's last menstrual period was 2019. lasting 5-6 days, menarche 15, vaginal bleeding reported as normal, denies dysmenorrhea   Ovulation predictor kits: no positive results    Denies acne, facial/back/abdominal hair growth, denies discharge, visual field defects    Pt denies excessive exercise, restrictive eating or a history of disordered eating.     Tubal, uterine, cervical factors: No STI hx or PID     General health   Pertinent PMH: Body mass index is 19.26 kg/m .   Pertinent medications: none    Male infertility factors:   Name, age, birthday: Froy Ramos, [birth year]1989   PMH: none, denies history of cancer or mumps    Smoking, alcohol, drug use   Prior children: miscarriage 2018    Semen analysis: none    Couple factors:   Coital activity: regular intercourse, denies any concerns for male ejaculation     Infertility treatment to date: none    Past Medical History:   Diagnosis Date     Chickenpox      Febrile seizure (H)     x1      Past Surgical History:   Procedure Laterality Date     EYE SURGERY      left eye     MOUTH SURGERY      wisdom teeth     TONSILLECTOMY & ADENOIDECTOMY      age 8      Current Outpatient Medications   Medication     Prenatal Vit-Fe Fumarate-FA (PRENATAL MULTIVITAMIN PLUS IRON) 27-0.8 MG TABS per tablet     No current facility-administered medications for this visit.      No Known Allergies  Family History   Problem Relation Age of Onset     Diabetes Maternal Grandmother      Heart Surgery Maternal Grandmother      Other - See Comments Maternal Grandfather         CJD ?     She denies  "any family history of birth defects, mental retardation, early menopause or infertility.  Took three years to get pregnant with her. Used clomid.     Social History     Tobacco Use     Smoking status: Never Smoker     Smokeless tobacco: Never Used   Substance Use Topics     Alcohol use: Yes     Comment: occas     Drug use: No       ROS: A 10 pt ROS was completed and found to be negative unless mentioned in the HPI.     Objective:   VS: /81 (BP Location: Right arm, Patient Position: Chair, Cuff Size: Adult Regular)   Pulse 81   Temp 98.2  F (36.8  C) (Tympanic)   Resp 16   Ht 1.715 m (5' 7.5\")   Wt 56.6 kg (124 lb 12.8 oz)   LMP 2019   Breastfeeding? No   BMI 19.26 kg/m    Gen: Pleasant, talkative female in no apparent distress   Endocrine: Thyroid without enlargement or nodularity   Dermatology: no acne, hirsutism or facial/back/abdominal hair growth appreciated   Lymph: no appreciable cervical lymphadenopathy  Respiratory: breathing comfortably on room air   Cardiac: warm and well-perfused.   GI: Abd soft and non-tender  MSK: Grossly normal movement of all four extremities  Psych: mood and affect bright     Assessment/Plan:   My impression is that this is a 28 year old  with primary infertility, most likely etiology is anovulation.   Counseled the patient on the basic steps to conception and reproductive anatomy, normal rates of conception during one year (~85%) and suspected pathology for her infertility. Discussed menstrual tracking, use of ovulation predictor kits and timed intercourse (every other day starting the day of +OPK). Recommended a menstrual calendar or phone cindy.     Plan for work-up with day #3 labs including estradiol, LH, FSH, TSH, prolactin, AMH, and Vit D, then day #21 progesterone. Given concern for PCOS, plan to obtain free and total testosterone, DHEAS, 17-OHP, HgbA1c and lipid panel. Will also obtain rubella and varicella (to assess for immunity and offer vaccination " prior to pregnancy). Plan to obtain pelvic US today to assess for uterine/ovarian/tubal pathology, then HSG (pt to call clinic on Day#1 of next menses) to assess for tubal patency. Pt is not at risk for PID with HSG, so will not pre-treat with doxycycline.     Froy Ramos. Orders placed. Will evaluate for possible male factor infertility with semen analysis and consult to Urology. Desired semen anaylsis parameters include (volume >1.5mL, concentration >15 million/mL, motility >40%, morphology >4% normal forms).     Preconception counseling: counseled patient to start daily PNV, limit caffeine intake (<250mg), no EtOH or drug use during pregnancy. I reviewed her medications and found no potential teratogens. Recommended against lubricant use given effects on sperm quality. Discussed patients BMI, Body mass index is 19.26 kg/m .. Pt is a non-smoker.     I spent a total of 30 min with the patient, of which >50% was spent in counseling and care coordination.     Discussed she might need clomid for ovulation induction. Discussed mechanism of action with clomid, side effects and risks of cyst stimulation and 10% risk of twinning and associated pregnancy complicated.     Will MyChart results and make plan.     I spent 30 min with the patient, >50% of which was spent in counseling and coordination of care.     Kaylee Orozco MD  OB/GYN  9/25/2019

## 2019-09-25 NOTE — NURSING NOTE
"Initial /81 (BP Location: Right arm, Patient Position: Chair, Cuff Size: Adult Regular)   Pulse 81   Temp 98.2  F (36.8  C) (Tympanic)   Resp 16   Ht 1.715 m (5' 7.5\")   Wt 56.6 kg (124 lb 12.8 oz)   LMP 08/31/2019   Breastfeeding? No   BMI 19.26 kg/m   Estimated body mass index is 19.26 kg/m  as calculated from the following:    Height as of this encounter: 1.715 m (5' 7.5\").    Weight as of this encounter: 56.6 kg (124 lb 12.8 oz). .    Linda Carias, MYRIAM  "

## 2019-09-25 NOTE — PATIENT INSTRUCTIONS
1) Go to lab today.  2) Call Radiology to schedule a pelvic US (Radiology Phone #: [phone]) and then an HSG (hysterosalpingogram) once your period starts.  3) Have your partner complete a semen analysis  4) We will MyChart with the results  Nice to see you again! - Dr. Orozco

## 2019-09-26 DIAGNOSIS — N97.9 FEMALE INFERTILITY: Primary | ICD-10-CM

## 2019-09-26 LAB — DHEA-S SERPL-MCNC: 129 UG/DL (ref 35–430)

## 2019-09-27 LAB
RUBV IGG SERPL IA-ACNC: 35 IU/ML
SHBG SERPL-SCNC: 45 NMOL/L (ref 30–135)
TESTOST FREE SERPL-MCNC: 0.36 NG/DL (ref 0.08–0.74)
TESTOST SERPL-MCNC: 25 NG/DL (ref 8–60)
VZV IGG SER QL IA: 4 AI (ref 0–0.8)

## 2019-09-28 LAB — MIS SERPL-MCNC: 11.23 NG/ML (ref 0.4–16.02)

## 2019-10-11 ENCOUNTER — HOSPITAL ENCOUNTER (OUTPATIENT)
Dept: ULTRASOUND IMAGING | Facility: CLINIC | Age: 29
Discharge: HOME | End: 2019-10-11
Attending: OBSTETRICS & GYNECOLOGY | Admitting: OBSTETRICS & GYNECOLOGY
Payer: COMMERCIAL

## 2019-10-11 ENCOUNTER — HOSPITAL ENCOUNTER (OUTPATIENT)
Dept: GENERAL RADIOLOGY | Facility: CLINIC | Age: 29
End: 2019-10-11
Attending: OBSTETRICS & GYNECOLOGY
Payer: COMMERCIAL

## 2019-10-11 DIAGNOSIS — N97.0 PRIMARY ANOVULATORY INFERTILITY: Primary | ICD-10-CM

## 2019-10-11 DIAGNOSIS — N97.9 FEMALE INFERTILITY: ICD-10-CM

## 2019-10-11 PROCEDURE — 74740 X-RAY FEMALE GENITAL TRACT: CPT

## 2019-10-11 PROCEDURE — 76830 TRANSVAGINAL US NON-OB: CPT

## 2019-10-11 PROCEDURE — 25500064 ZZH RX 255 OP 636: Performed by: OBSTETRICS & GYNECOLOGY

## 2019-10-11 RX ORDER — LETROZOLE 2.5 MG/1
2.5 TABLET, FILM COATED ORAL DAILY
Qty: 5 TABLET | Refills: 3 | Status: SHIPPED | OUTPATIENT
Start: 2019-10-11 | End: 2019-11-13

## 2019-10-11 RX ORDER — IOPAMIDOL 510 MG/ML
50 INJECTION, SOLUTION INTRAVASCULAR ONCE
Status: COMPLETED | OUTPATIENT
Start: 2019-10-11 | End: 2019-10-11

## 2019-10-11 RX ADMIN — IOPAMIDOL 10 ML: 510 INJECTION, SOLUTION INTRAVASCULAR at 14:30

## 2019-10-22 DIAGNOSIS — E03.8 SUBCLINICAL HYPOTHYROIDISM: Primary | ICD-10-CM

## 2019-10-22 DIAGNOSIS — N97.9 FEMALE INFERTILITY: ICD-10-CM

## 2019-10-22 DIAGNOSIS — N97.0 PRIMARY ANOVULATORY INFERTILITY: ICD-10-CM

## 2019-10-22 LAB
PROGEST SERPL-MCNC: 8.9 NG/ML
PROLACTIN SERPL-MCNC: 16 UG/L (ref 3–27)
T4 FREE SERPL-MCNC: 0.82 NG/DL (ref 0.76–1.46)

## 2019-10-22 PROCEDURE — 84439 ASSAY OF FREE THYROXINE: CPT | Performed by: OBSTETRICS & GYNECOLOGY

## 2019-10-22 PROCEDURE — 84146 ASSAY OF PROLACTIN: CPT | Performed by: OBSTETRICS & GYNECOLOGY

## 2019-10-22 PROCEDURE — 84144 ASSAY OF PROGESTERONE: CPT | Performed by: OBSTETRICS & GYNECOLOGY

## 2019-10-22 PROCEDURE — 36415 COLL VENOUS BLD VENIPUNCTURE: CPT | Performed by: OBSTETRICS & GYNECOLOGY

## 2019-10-22 RX ORDER — LEVOTHYROXINE SODIUM 25 UG/1
25 TABLET ORAL DAILY
Qty: 30 TABLET | Refills: 6 | Status: SHIPPED | OUTPATIENT
Start: 2019-10-22 | End: 2019-12-20

## 2019-11-11 ENCOUNTER — OFFICE VISIT (OUTPATIENT)
Dept: FAMILY MEDICINE | Facility: CLINIC | Age: 29
End: 2019-11-11
Payer: COMMERCIAL

## 2019-11-11 VITALS
HEIGHT: 68 IN | DIASTOLIC BLOOD PRESSURE: 70 MMHG | WEIGHT: 124.7 LBS | SYSTOLIC BLOOD PRESSURE: 110 MMHG | OXYGEN SATURATION: 99 % | BODY MASS INDEX: 18.9 KG/M2 | HEART RATE: 98 BPM | TEMPERATURE: 98.2 F

## 2019-11-11 DIAGNOSIS — Z23 NEED FOR VACCINATION: ICD-10-CM

## 2019-11-11 DIAGNOSIS — Z00.00 ANNUAL PHYSICAL EXAM: Primary | ICD-10-CM

## 2019-11-11 DIAGNOSIS — Z32.01 POSITIVE BLOOD PREGNANCY TEST: ICD-10-CM

## 2019-11-11 DIAGNOSIS — Z87.59 HISTORY OF MISCARRIAGE: ICD-10-CM

## 2019-11-11 LAB
B-HCG SERPL-ACNC: 3895 IU/L (ref 0–5)
GLUCOSE SERPL-MCNC: 96 MG/DL (ref 70–99)
T4 FREE SERPL-MCNC: 0.92 NG/DL (ref 0.76–1.46)
TSH SERPL DL<=0.005 MIU/L-ACNC: 2.17 MU/L (ref 0.4–4)

## 2019-11-11 PROCEDURE — 84439 ASSAY OF FREE THYROXINE: CPT | Performed by: NURSE PRACTITIONER

## 2019-11-11 PROCEDURE — 90715 TDAP VACCINE 7 YRS/> IM: CPT | Performed by: NURSE PRACTITIONER

## 2019-11-11 PROCEDURE — 82947 ASSAY GLUCOSE BLOOD QUANT: CPT | Performed by: NURSE PRACTITIONER

## 2019-11-11 PROCEDURE — 36415 COLL VENOUS BLD VENIPUNCTURE: CPT | Performed by: NURSE PRACTITIONER

## 2019-11-11 PROCEDURE — 84702 CHORIONIC GONADOTROPIN TEST: CPT | Performed by: NURSE PRACTITIONER

## 2019-11-11 PROCEDURE — 99395 PREV VISIT EST AGE 18-39: CPT | Mod: 25 | Performed by: NURSE PRACTITIONER

## 2019-11-11 PROCEDURE — 99213 OFFICE O/P EST LOW 20 MIN: CPT | Mod: 25 | Performed by: NURSE PRACTITIONER

## 2019-11-11 PROCEDURE — 90471 IMMUNIZATION ADMIN: CPT | Performed by: NURSE PRACTITIONER

## 2019-11-11 PROCEDURE — 84443 ASSAY THYROID STIM HORMONE: CPT | Performed by: NURSE PRACTITIONER

## 2019-11-11 ASSESSMENT — MIFFLIN-ST. JEOR: SCORE: 1341.2

## 2019-11-11 NOTE — PROGRESS NOTES
SUBJECTIVE:   CC: Allie Ramos is an 28 year old woman who presents for preventive health visit. The patient is feeling well, she had 2 positive home pregnancy tests, last LMP on October 5 th, she states she has been having irregular periods since miscarriage in June this year, she states her cycle can be from 20 to 50 days.     Chief Complaint   Patient presents with     Physical     Pregnancy Test     Has had 2 positive pregnancy tests at home, has miscarried once in the past last June        Healthy Habits:     Getting at least 3 servings of Calcium per day:  Yes    Bi-annual eye exam:  NO    Dental care twice a year:  Yes    Sleep apnea or symptoms of sleep apnea:  None    Frequency of exercise:  2-3 days/week    Duration of exercise:  45-60 minutes    Taking medications regularly:  No    Barriers to taking medications:  Other    Medication side effects:  None    PHQ-2 Total Score: 0    Additional concerns today:  No      Today's PHQ-2 Score:   PHQ-2 ( 1999 Pfizer) 11/11/2019   Q1: Little interest or pleasure in doing things 0   Q2: Feeling down, depressed or hopeless 0   PHQ-2 Score 0     Abuse: Current or Past(Physical, Sexual or Emotional)- No  Do you feel safe in your environment? Yes      Social History     Tobacco Use     Smoking status: Never Smoker     Smokeless tobacco: Never Used   Substance Use Topics     Alcohol use: Not Currently     Comment: occas     If you drink alcohol do you typically have >3 drinks per day or >7 drinks per week? Not applicable    Alcohol Use 11/11/2019   Prescreen: >3 drinks/day or >7 drinks/week? Not Applicable       Reviewed orders with patient.  Reviewed health maintenance and updated orders accordingly - Yes  Labs reviewed in EPIC    Mammogram not appropriate for this patient based on age.    Pertinent mammograms are reviewed under the imaging tab.  History of abnormal Pap smear: NO - age 21-29 PAP every 3 years recommended  PAP / HPV 12/14/2018   PAP NIL     Reviewed  "and updated as needed this visit by clinical staff  Tobacco  Allergies  Meds  Med Hx  Surg Hx  Fam Hx  Soc Hx        Reviewed and updated as needed this visit by Provider            Review of Systems  CONSTITUTIONAL: NEGATIVE for fever, chills, change in weight  INTEGUMENTARU/SKIN: NEGATIVE for worrisome rashes, moles or lesions  EYES: NEGATIVE for vision changes or irritation  ENT: NEGATIVE for ear, mouth and throat problems  RESP: NEGATIVE for significant cough or SOB  BREAST: NEGATIVE for masses, tenderness or discharge  CV: NEGATIVE for chest pain, palpitations or peripheral edema  GI: NEGATIVE for nausea, abdominal pain, heartburn, or change in bowel habits   female: irregular periods since miscarriage in June, positive home pregnancy test   MUSCULOSKELETAL: NEGATIVE for significant arthralgias or myalgia  NEURO: NEGATIVE for weakness, dizziness or paresthesias  PSYCHIATRIC: NEGATIVE for changes in mood or affect     OBJECTIVE:   /70 (BP Location: Right arm, Patient Position: Sitting, Cuff Size: Adult Regular)   Pulse 98   Temp 98.2  F (36.8  C) (Tympanic)   Ht 1.715 m (5' 7.5\")   Wt 56.6 kg (124 lb 11.2 oz)   LMP 10/02/2019   SpO2 99%   BMI 19.24 kg/m    Physical Exam  GENERAL: healthy, alert and no distress  EYES: Eyes grossly normal to inspection, PERRL and conjunctivae and sclerae normal  HENT: ear canals and TM's normal, nose and mouth without ulcers or lesions  NECK: no adenopathy, no asymmetry, masses, or scars and thyroid normal to palpation  RESP: lungs clear to auscultation - no rales, rhonchi or wheezes  CV: regular rate and rhythm, normal S1 S2, no S3 or S4, no murmur, click or rub, no peripheral edema and peripheral pulses strong  MS: no gross musculoskeletal defects noted, no edema  SKIN: no suspicious lesions or rashes  NEURO: Normal strength and tone, mentation intact and speech normal  PSYCH: mentation appears normal, affect normal/bright    Diagnostic Test Results:  Labs " "reviewed in Epic  Results for orders placed or performed in visit on 11/11/19 (from the past 24 hour(s))   HCG Quantitative Pregnancy, Blood ([id number])   Result Value Ref Range    HCG Quantitative Serum 3,895 (H) 0 - 5 IU/L   TSH   Result Value Ref Range    TSH 2.17 0.40 - 4.00 mU/L   T4, free   Result Value Ref Range    T4 Free 0.92 0.76 - 1.46 ng/dL   Glucose   Result Value Ref Range    Glucose 96 70 - 99 mg/dL       ASSESSMENT/PLAN:   1. Annual physical exam    - Glucose    2. Positive blood pregnancy test    - HCG Quantitative Pregnancy, Blood ([id number])  - TSH  - T4, free  - HCG Quantitative Pregnancy, Blood ([id number]); Future  - OB/GYN REFERRAL    3. Need for vaccination    - TDAP VACCINE (ADACEL) [65777.002]  - 1st  Administration  [[id number]]    4. History of miscarriage    - HCG Quantitative Pregnancy, Blood ([id number])  - HCG Quantitative Pregnancy, Blood ([id number]); Future    COUNSELING:  Reviewed preventive health counseling, as reflected in patient instructions       Regular exercise       Healthy diet/nutrition    Estimated body mass index is 19.24 kg/m  as calculated from the following:    Height as of this encounter: 1.715 m (5' 7.5\").    Weight as of this encounter: 56.6 kg (124 lb 11.2 oz).         reports that she has never smoked. She has never used smokeless tobacco.      Counseling Resources:  ATP IV Guidelines  Pooled Cohorts Equation Calculator  Breast Cancer Risk Calculator  FRAX Risk Assessment  ICSI Preventive Guidelines  Dietary Guidelines for Americans, 2010  USDA's MyPlate  ASA Prophylaxis  Lung CA Screening    [facility]  "

## 2019-11-12 ENCOUNTER — APPOINTMENT (OUTPATIENT)
Dept: OBGYN | Facility: CLINIC | Age: 29
End: 2019-11-12
Payer: COMMERCIAL

## 2019-11-13 ENCOUNTER — PRENATAL OFFICE VISIT (OUTPATIENT)
Dept: OBGYN | Facility: CLINIC | Age: 29
End: 2019-11-13

## 2019-11-13 DIAGNOSIS — Z87.59 HISTORY OF MISCARRIAGE: ICD-10-CM

## 2019-11-13 DIAGNOSIS — Z34.00 PRENATAL CARE, FIRST PREGNANCY: Primary | ICD-10-CM

## 2019-11-13 DIAGNOSIS — Z32.01 POSITIVE BLOOD PREGNANCY TEST: ICD-10-CM

## 2019-11-13 LAB — B-HCG SERPL-ACNC: 6017 IU/L (ref 0–5)

## 2019-11-13 PROCEDURE — 36415 COLL VENOUS BLD VENIPUNCTURE: CPT | Performed by: NURSE PRACTITIONER

## 2019-11-13 PROCEDURE — 84702 CHORIONIC GONADOTROPIN TEST: CPT | Performed by: NURSE PRACTITIONER

## 2019-11-13 PROCEDURE — 99207 ZZC NO CHARGE NURSE ONLY: CPT | Performed by: OBSTETRICS & GYNECOLOGY

## 2019-11-27 ENCOUNTER — PRENATAL OFFICE VISIT (OUTPATIENT)
Dept: OBGYN | Facility: CLINIC | Age: 29
End: 2019-11-27
Payer: COMMERCIAL

## 2019-11-27 VITALS
RESPIRATION RATE: 16 BRPM | BODY MASS INDEX: 18.94 KG/M2 | HEIGHT: 68 IN | HEART RATE: 94 BPM | DIASTOLIC BLOOD PRESSURE: 78 MMHG | SYSTOLIC BLOOD PRESSURE: 123 MMHG | WEIGHT: 125 LBS | TEMPERATURE: 98.8 F

## 2019-11-27 DIAGNOSIS — Z34.01 ENCOUNTER FOR PRENATAL CARE IN FIRST TRIMESTER OF FIRST PREGNANCY: Primary | ICD-10-CM

## 2019-11-27 LAB
ABO + RH BLD: NORMAL
ABO + RH BLD: NORMAL
ALBUMIN UR-MCNC: NEGATIVE MG/DL
AMPHETAMINES UR QL: NOT DETECTED NG/ML
APPEARANCE UR: CLEAR
BARBITURATES UR QL SCN: NOT DETECTED NG/ML
BENZODIAZ UR QL SCN: NOT DETECTED NG/ML
BILIRUB UR QL STRIP: NEGATIVE
BLD GP AB SCN SERPL QL: NORMAL
BLOOD BANK CMNT PATIENT-IMP: NORMAL
BUPRENORPHINE UR QL: NOT DETECTED NG/ML
CANNABINOIDS UR QL: NOT DETECTED NG/ML
COCAINE UR QL SCN: NOT DETECTED NG/ML
COLOR UR AUTO: YELLOW
D-METHAMPHET UR QL: NOT DETECTED NG/ML
ERYTHROCYTE [DISTWIDTH] IN BLOOD BY AUTOMATED COUNT: 11.3 % (ref 10–15)
GLUCOSE UR STRIP-MCNC: NEGATIVE MG/DL
HCT VFR BLD AUTO: 40 % (ref 35–47)
HGB BLD-MCNC: 13.7 G/DL (ref 11.7–15.7)
HGB UR QL STRIP: NEGATIVE
KETONES UR STRIP-MCNC: NEGATIVE MG/DL
LEUKOCYTE ESTERASE UR QL STRIP: NEGATIVE
MCH RBC QN AUTO: 31.8 PG (ref 26.5–33)
MCHC RBC AUTO-ENTMCNC: 34.3 G/DL (ref 31.5–36.5)
MCV RBC AUTO: 93 FL (ref 78–100)
METHADONE UR QL SCN: NOT DETECTED NG/ML
NITRATE UR QL: NEGATIVE
OPIATES UR QL SCN: NOT DETECTED NG/ML
OXYCODONE UR QL SCN: NOT DETECTED NG/ML
PCP UR QL SCN: NOT DETECTED NG/ML
PH UR STRIP: 5.5 PH (ref 5–7)
PLATELET # BLD AUTO: 257 10E9/L (ref 150–450)
PROPOXYPH UR QL: NOT DETECTED NG/ML
RBC # BLD AUTO: 4.31 10E12/L (ref 3.8–5.2)
SOURCE: NORMAL
SP GR UR STRIP: 1.01 (ref 1–1.03)
SPECIMEN EXP DATE BLD: NORMAL
TRICYCLICS UR QL SCN: NOT DETECTED NG/ML
UROBILINOGEN UR STRIP-ACNC: 0.2 EU/DL (ref 0.2–1)
WBC # BLD AUTO: 7.1 10E9/L (ref 4–11)

## 2019-11-27 PROCEDURE — 87340 HEPATITIS B SURFACE AG IA: CPT | Performed by: OBSTETRICS & GYNECOLOGY

## 2019-11-27 PROCEDURE — 87591 N.GONORRHOEAE DNA AMP PROB: CPT | Performed by: OBSTETRICS & GYNECOLOGY

## 2019-11-27 PROCEDURE — 86762 RUBELLA ANTIBODY: CPT | Performed by: OBSTETRICS & GYNECOLOGY

## 2019-11-27 PROCEDURE — 99207 ZZC FIRST OB VISIT: CPT | Performed by: OBSTETRICS & GYNECOLOGY

## 2019-11-27 PROCEDURE — 86850 RBC ANTIBODY SCREEN: CPT | Performed by: OBSTETRICS & GYNECOLOGY

## 2019-11-27 PROCEDURE — 86780 TREPONEMA PALLIDUM: CPT | Performed by: OBSTETRICS & GYNECOLOGY

## 2019-11-27 PROCEDURE — 86900 BLOOD TYPING SEROLOGIC ABO: CPT | Performed by: OBSTETRICS & GYNECOLOGY

## 2019-11-27 PROCEDURE — 87389 HIV-1 AG W/HIV-1&-2 AB AG IA: CPT | Performed by: OBSTETRICS & GYNECOLOGY

## 2019-11-27 PROCEDURE — 36415 COLL VENOUS BLD VENIPUNCTURE: CPT | Performed by: OBSTETRICS & GYNECOLOGY

## 2019-11-27 PROCEDURE — 76817 TRANSVAGINAL US OBSTETRIC: CPT | Performed by: OBSTETRICS & GYNECOLOGY

## 2019-11-27 PROCEDURE — 87491 CHLMYD TRACH DNA AMP PROBE: CPT | Performed by: OBSTETRICS & GYNECOLOGY

## 2019-11-27 PROCEDURE — 87086 URINE CULTURE/COLONY COUNT: CPT | Performed by: OBSTETRICS & GYNECOLOGY

## 2019-11-27 PROCEDURE — 81003 URINALYSIS AUTO W/O SCOPE: CPT | Mod: 59 | Performed by: OBSTETRICS & GYNECOLOGY

## 2019-11-27 PROCEDURE — 85027 COMPLETE CBC AUTOMATED: CPT | Performed by: OBSTETRICS & GYNECOLOGY

## 2019-11-27 PROCEDURE — 80306 DRUG TEST PRSMV INSTRMNT: CPT | Performed by: OBSTETRICS & GYNECOLOGY

## 2019-11-27 PROCEDURE — 86901 BLOOD TYPING SEROLOGIC RH(D): CPT | Performed by: OBSTETRICS & GYNECOLOGY

## 2019-11-27 ASSESSMENT — MIFFLIN-ST. JEOR: SCORE: 1342.56

## 2019-11-27 NOTE — NURSING NOTE
"Initial /78 (BP Location: Right arm, Patient Position: Chair, Cuff Size: Adult Small)   Pulse 94   Temp 98.8  F (37.1  C) (Tympanic)   Resp 16   Ht 1.715 m (5' 7.5\")   Wt 56.7 kg (125 lb)   LMP 10/02/2019   BMI 19.29 kg/m   Estimated body mass index is 19.29 kg/m  as calculated from the following:    Height as of this encounter: 1.715 m (5' 7.5\").    Weight as of this encounter: 56.7 kg (125 lb). .      "

## 2019-11-27 NOTE — PROGRESS NOTES
.  Discussed physician coverage, tertiary support, diet, exercise, weight gain, schedule of visits, routine and indicated ultrasounds, childbirth education and antepartum testing for certain birth defects.  Encouraged patient to review contents of Prenatal Breastfeeding Education Toolkit. Offered opportunity to answer questions regarding the importance of skin to skin contact, early initiation of exclusive breastfeeding for the first six months and rooming in while in the hospital.  Discussed [facility] [place]note 2] travel advisory for Zika virus.  Syphilis is a sexually transmitted disease that can cause birth defects in the babies of untreated mothers. Every pregnant patient is tested for syphilis early in each pregnancy as part of the routine lab work. The [place] Department of [facility] has seen an increase in the rate of syphilis in [place]. The [facility] now recommends testing for syphilis 3 times during a pregnancy, the new prenatal visit, 28 weeks and when admitted for delivery. Patient accepts lab testing for syphilis.        Options for  testing for birth defects were discussed with the patient, generally including CVS testing, Quad screen serum testing, nuchal lucency/blood marker testing, genetic amniocentesis, Verifi testing  and/or level 2 ultrasound.    Current issues include: none    Past medical, surgical, social and family histories reviewed on OB questionaire and included on episode summary.  Pertinent review of systems items stated above. See OB questionaire for pertinent components of HPI.    Past Medical History:   Diagnosis Date     Chickenpox      Febrile seizure (H)     x1     See epic chart    Past Surgical History:   Procedure Laterality Date     EYE SURGERY      left eye     MOUTH SURGERY      wisdom teeth     TONSILLECTOMY & ADENOIDECTOMY      age 8     See epic chart    Patient Active Problem List    Diagnosis Date Noted     Spontaneous  2018     Priority: Medium     Prenatal care, first  "pregnancy 06/18/2018     Priority: Medium       OBJECTIVE: /78 (BP Location: Right arm, Patient Position: Chair, Cuff Size: Adult Small)   Pulse 94   Temp 98.8  F (37.1  C) (Tympanic)   Resp 16   Ht 1.715 m (5' 7.5\")   Wt 56.7 kg (125 lb)   LMP 10/02/2019   BMI 19.29 kg/m      GENERAL APPEARANCE: healthy, alert and no distress  ABDOMEN:  soft, nontender, no hepato-splenomegaly or hernias  PELVIC:  EGBUS:  within normal limits  VAGINA:  normoestrogenic, well-supported, no unusual discharge  CERVIX:  smooth, non-friable, no gross lesions, thin-layer PAP was not taken  UTERUS:  anteverted and enlarged, 8 weeks size  ADNEXAE:  non-tender, no masses palpable, no cul de sac nodularity     NECK: no adenopathy, no asymmetry, masses, or scars and thyroid normal to palpation     RESP: lungs clear to auscultation , respiratory effort WNL     CV: regular rates and rhythm, normal S1 S2, no S3 or S4 and no murmur, click or rub -     SKIN: no suspicious lesions or rashes     PSYCH: mentation appears normal. and affect normal/bright, oriented to person/place/time     LYMPHATICS: No axillary, cervical, inguinal, or supraclavicular nodes    Transvaginal ultrasound was performed. A live single intrauterine pregnancy was seen.  CRL=1.38 cm, c/w 7 weeks, 5 days.  EDC by sono =7/10/20    Fetal heart motion was visualized.                ASSESSMENT:    ICD-10-CM    1. Encounter for prenatal care in first trimester of first pregnancy Z34.01 CBC with platelets     ABO/Rh type and screen     Hepatitis B surface antigen     Rubella Antibody IgG Quantitative     Urine Culture Aerobic Bacterial     *UA reflex to Microscopic     HIV Antigen Antibody Combo     US OB <14 Weeks w Transvaginal Single     Urine Drugs of Abuse Screen Panel 13     Treponema Abs w Reflex to RPR and Titer     Chlamydia trachomatis PCR     Neisseria gonorrhoeae PCR         PLAN: see orders and OB problem list annotations    Katya Hong MD      "

## 2019-11-28 LAB
BACTERIA SPEC CULT: NO GROWTH
Lab: NORMAL
SPECIMEN SOURCE: NORMAL

## 2019-11-29 LAB
C TRACH DNA SPEC QL NAA+PROBE: NEGATIVE
HBV SURFACE AG SERPL QL IA: NONREACTIVE
HIV 1+2 AB+HIV1 P24 AG SERPL QL IA: NONREACTIVE
N GONORRHOEA DNA SPEC QL NAA+PROBE: NEGATIVE
RUBV IGG SERPL IA-ACNC: 28 IU/ML
SPECIMEN SOURCE: NORMAL
SPECIMEN SOURCE: NORMAL
T PALLIDUM AB SER QL: NONREACTIVE

## 2019-12-15 ENCOUNTER — NURSE TRIAGE (OUTPATIENT)
Dept: NURSING | Facility: CLINIC | Age: 29
End: 2019-12-15

## 2019-12-15 ENCOUNTER — HOSPITAL ENCOUNTER (EMERGENCY)
Facility: CLINIC | Age: 29
Discharge: HOME | End: 2019-12-15
Attending: FAMILY MEDICINE | Admitting: FAMILY MEDICINE
Payer: COMMERCIAL

## 2019-12-15 ENCOUNTER — APPOINTMENT (OUTPATIENT)
Dept: ULTRASOUND IMAGING | Facility: CLINIC | Age: 29
End: 2019-12-15
Attending: FAMILY MEDICINE
Payer: COMMERCIAL

## 2019-12-15 VITALS
DIASTOLIC BLOOD PRESSURE: 75 MMHG | OXYGEN SATURATION: 99 % | BODY MASS INDEX: 18.94 KG/M2 | HEIGHT: 68 IN | TEMPERATURE: 97.5 F | SYSTOLIC BLOOD PRESSURE: 105 MMHG | WEIGHT: 125 LBS | RESPIRATION RATE: 16 BRPM

## 2019-12-15 DIAGNOSIS — R10.33 PERIUMBILICAL PAIN: ICD-10-CM

## 2019-12-15 DIAGNOSIS — R31.21 ASYMPTOMATIC MICROSCOPIC HEMATURIA: ICD-10-CM

## 2019-12-15 LAB
ALBUMIN SERPL-MCNC: 4.4 G/DL (ref 3.4–5)
ALBUMIN UR-MCNC: NEGATIVE MG/DL
ALP SERPL-CCNC: 34 U/L (ref 40–150)
ALT SERPL W P-5'-P-CCNC: 21 U/L (ref 0–50)
ANION GAP SERPL CALCULATED.3IONS-SCNC: 5 MMOL/L (ref 3–14)
APPEARANCE UR: CLEAR
AST SERPL W P-5'-P-CCNC: 16 U/L (ref 0–45)
BACTERIA #/AREA URNS HPF: ABNORMAL /HPF
BASOPHILS # BLD AUTO: 0 10E9/L (ref 0–0.2)
BASOPHILS NFR BLD AUTO: 0.6 %
BILIRUB DIRECT SERPL-MCNC: 0.2 MG/DL (ref 0–0.2)
BILIRUB SERPL-MCNC: 0.6 MG/DL (ref 0.2–1.3)
BILIRUB UR QL STRIP: NEGATIVE
BUN SERPL-MCNC: 5 MG/DL (ref 7–30)
CALCIUM SERPL-MCNC: 9.2 MG/DL (ref 8.5–10.1)
CHLORIDE SERPL-SCNC: 109 MMOL/L (ref 94–109)
CO2 SERPL-SCNC: 27 MMOL/L (ref 20–32)
COLOR UR AUTO: ABNORMAL
CREAT SERPL-MCNC: 0.53 MG/DL (ref 0.52–1.04)
DIFFERENTIAL METHOD BLD: NORMAL
EOSINOPHIL # BLD AUTO: 0.1 10E9/L (ref 0–0.7)
EOSINOPHIL NFR BLD AUTO: 1.3 %
ERYTHROCYTE [DISTWIDTH] IN BLOOD BY AUTOMATED COUNT: 11.3 % (ref 10–15)
GFR SERPL CREATININE-BSD FRML MDRD: >90 ML/MIN/{1.73_M2}
GLUCOSE SERPL-MCNC: 83 MG/DL (ref 70–99)
GLUCOSE UR STRIP-MCNC: NEGATIVE MG/DL
HCT VFR BLD AUTO: 41.3 % (ref 35–47)
HGB BLD-MCNC: 14.1 G/DL (ref 11.7–15.7)
HGB UR QL STRIP: NEGATIVE
IMM GRANULOCYTES # BLD: 0 10E9/L (ref 0–0.4)
IMM GRANULOCYTES NFR BLD: 0.3 %
KETONES UR STRIP-MCNC: 5 MG/DL
LEUKOCYTE ESTERASE UR QL STRIP: NEGATIVE
LYMPHOCYTES # BLD AUTO: 1.7 10E9/L (ref 0.8–5.3)
LYMPHOCYTES NFR BLD AUTO: 23.8 %
MCH RBC QN AUTO: 31.3 PG (ref 26.5–33)
MCHC RBC AUTO-ENTMCNC: 34.1 G/DL (ref 31.5–36.5)
MCV RBC AUTO: 92 FL (ref 78–100)
MONOCYTES # BLD AUTO: 0.6 10E9/L (ref 0–1.3)
MONOCYTES NFR BLD AUTO: 7.8 %
MUCOUS THREADS #/AREA URNS LPF: PRESENT /LPF
NEUTROPHILS # BLD AUTO: 4.8 10E9/L (ref 1.6–8.3)
NEUTROPHILS NFR BLD AUTO: 66.2 %
NITRATE UR QL: NEGATIVE
NRBC # BLD AUTO: 0 10*3/UL
NRBC BLD AUTO-RTO: 0 /100
PH UR STRIP: 7 PH (ref 5–7)
PLATELET # BLD AUTO: 255 10E9/L (ref 150–450)
POTASSIUM SERPL-SCNC: 3.9 MMOL/L (ref 3.4–5.3)
PROT SERPL-MCNC: 7.7 G/DL (ref 6.8–8.8)
RBC # BLD AUTO: 4.51 10E12/L (ref 3.8–5.2)
RBC #/AREA URNS AUTO: 8 /HPF (ref 0–2)
SODIUM SERPL-SCNC: 141 MMOL/L (ref 133–144)
SOURCE: ABNORMAL
SP GR UR STRIP: 1 (ref 1–1.03)
SQUAMOUS #/AREA URNS AUTO: 2 /HPF (ref 0–1)
UROBILINOGEN UR STRIP-MCNC: 0 MG/DL (ref 0–2)
WBC # BLD AUTO: 7.2 10E9/L (ref 4–11)
WBC #/AREA URNS AUTO: 1 /HPF (ref 0–5)

## 2019-12-15 PROCEDURE — 99284 EMERGENCY DEPT VISIT MOD MDM: CPT | Mod: 25 | Performed by: FAMILY MEDICINE

## 2019-12-15 PROCEDURE — 81001 URINALYSIS AUTO W/SCOPE: CPT | Performed by: FAMILY MEDICINE

## 2019-12-15 PROCEDURE — 76801 OB US < 14 WKS SINGLE FETUS: CPT

## 2019-12-15 PROCEDURE — 80048 BASIC METABOLIC PNL TOTAL CA: CPT | Performed by: FAMILY MEDICINE

## 2019-12-15 PROCEDURE — 99284 EMERGENCY DEPT VISIT MOD MDM: CPT | Mod: Z6 | Performed by: FAMILY MEDICINE

## 2019-12-15 PROCEDURE — 80076 HEPATIC FUNCTION PANEL: CPT | Performed by: FAMILY MEDICINE

## 2019-12-15 PROCEDURE — 85025 COMPLETE CBC W/AUTO DIFF WBC: CPT | Performed by: FAMILY MEDICINE

## 2019-12-15 ASSESSMENT — MIFFLIN-ST. JEOR: SCORE: 1350.5

## 2019-12-15 NOTE — TELEPHONE ENCOUNTER
"Patient states she is 11 weeks pregnant. EDC 07/08/2020. Pregnancy #2.   History of miscarriage at 6 weeks with pregnancy #2 by report.    Currently has had mid abdominal cramping x last 2 hours.   States cramping comes in waves and is constant. At peak pain is \"6-7\" on a 1-10 pain scale. Pain increases with moving around.  Denies vaginal bleeding.  No vaginal discharge.  Sees OB/Gyn at [facility].    Protocol-  Pregfnancy- Abdominal Pain Less than 20 Weeks- Adult  Care advice reviewed.   Disposition-  Go to ED now  Caller states understanding of the recommended disposition.   Caller plans to go to [place] ED now.   Advised to call back if further questions or concerns.     FABIANA Duarte RN  [facility]     Reason for Disposition    MODERATE-SEVERE abdominal pain (e.g., interferes with normal activities, awakens from sleep)    Additional Information    Negative: Passed out (i.e., lost consciousness, collapsed and was not responding)    Negative: Shock suspected (e.g., cold/pale/clammy skin, too weak to stand, low BP, rapid pulse)    Negative: Difficult to awaken or acting confused (e.g., disoriented, slurred speech)    Negative: Sounds like a life-threatening emergency to the triager    Protocols used: PREGNANCY - ABDOMINAL PAIN LESS THAN 20 WEEKS JAVI-ALEJANDRO    "

## 2019-12-15 NOTE — ED PROVIDER NOTES
HPI   The patient is a 28-year-old female presenting with abdominal pain.  She is known to be approximately 11 weeks gestation.  She had an normal OB ultrasound at about 9 weeks.  She had a miscarriage about a year and a half ago with a baby at 6 weeks gestation.  She denies recent medication changes.  She does not drink alcohol.  She does not smoke.  No drugs of abuse.  No recent trauma or injury.    The patient describes periumbilical pain starting at about 8:30 AM this morning.  The pain is been constant since.  It is mild to moderate in severity.  It is not worsened over the course of the morning.  She denies nausea or vomiting.  She denies bowel changes.  She denies urinary symptoms such as dysuria, urgency, frequency, or hematuria.  She denies new vaginal discharge or irritation.  She denies vaginal bleeding.  She denies any skin rash.  The pain does not radiate into her back, chest, or pelvis.        Allergies:  No Known Allergies  Problem List:    Patient Active Problem List    Diagnosis Date Noted     Spontaneous  2018     Priority: Medium     Prenatal care, first pregnancy 2018     Priority: Medium      Past Medical History:    Past Medical History:   Diagnosis Date     Chickenpox      Febrile seizure (H)      Past Surgical History:    Past Surgical History:   Procedure Laterality Date     EYE SURGERY      left eye     MOUTH SURGERY      wisdom teeth     TONSILLECTOMY & ADENOIDECTOMY      age 8     Family History:    Family History   Problem Relation Age of Onset     Diabetes Maternal Grandmother      Heart Surgery Maternal Grandmother      Other - See Comments Maternal Grandfather         CJD ?     Social History:  Marital Status:   [2]  Social History     Tobacco Use     Smoking status: Never Smoker     Smokeless tobacco: Never Used   Substance Use Topics     Alcohol use: Not Currently     Comment: occas-quit with pregnancy     Drug use: No      Medications:    levothyroxine  "(SYNTHROID/LEVOTHROID) 25 MCG tablet  Prenatal Vit-Fe Fumarate-FA (PRENATAL MULTIVITAMIN PLUS IRON) 27-0.8 MG TABS per tablet      Review of Systems   All other systems reviewed and are negative.      PE   BP: 108/79  Heart Rate: 84  Temp: 97.5  F (36.4  C)  Resp: 16  Height: 172.7 cm (5' 8\")  Weight: 56.7 kg (125 lb)  SpO2: 99 %  Physical Exam  Vitals signs reviewed.   Constitutional:       General: She is in acute distress.      Appearance: She is well-developed.      Comments: Tearful, anxious.   HENT:      Head: Normocephalic and atraumatic.   Eyes:      Conjunctiva/sclera: Conjunctivae normal.   Neck:      Musculoskeletal: Normal range of motion.   Cardiovascular:      Rate and Rhythm: Normal rate and regular rhythm.   Pulmonary:      Effort: Pulmonary effort is normal.   Abdominal:      Palpations: Abdomen is soft.      Tenderness: There is abdominal tenderness.      Comments: The patient has some tenderness especially on the top side of her umbilicus.  I did not appreciate a mass or fullness in this area.  No overlying skin changes.  Abdomen is soft.  The rest of her abdomen is not tender, specifically in the midline pelvis.   Musculoskeletal: Normal range of motion.   Skin:     General: Skin is warm and dry.   Neurological:      Mental Status: She is alert and oriented to person, place, and time.   Psychiatric:         Behavior: Behavior normal.         ED COURSE and MDM   1124.  The patient is without pelvic symptoms.  She is most concerned about an impending miscarriage.  I do not think this is the case.  I would be more concerned about the periumbilical hernia or early gastrointestinal infection or less likely perhaps early appendicitis.  Pelvic ultrasound ordered.  Lab values ordered.  Urine analysis ordered.    1240.  The work-up here has been unremarkable.  Her ultrasound is reviewed with the patient.  Her blood work is reviewed with the patient.  She does have some microscopic hematuria identified " though the cause of this is unknown.  She does not present as someone with a ureteral stone.  No emergent need for CT imaging.  She is comfortable in the room and happy to monitor her symptoms over the next few days and then follow-up as needed or return for worsening.  Her next will be scheduled appointment is on 12/27.    LABS  Labs Ordered and Resulted from Time of ED Arrival Up to the Time of Departure from the ED   ROUTINE UA WITH MICROSCOPIC REFLEX TO CULTURE - Abnormal; Notable for the following components:       Result Value    Ketones Urine 5 (*)     RBC Urine 8 (*)     Bacteria Urine Few (*)     Squamous Epithelial /HPF Urine 2 (*)     Mucous Urine Present (*)     All other components within normal limits   BASIC METABOLIC PANEL - Abnormal; Notable for the following components:    Urea Nitrogen 5 (*)     All other components within normal limits   HEPATIC PANEL - Abnormal; Notable for the following components:    Alkaline Phosphatase 34 (*)     All other components within normal limits   CBC WITH PLATELETS DIFFERENTIAL       IMAGING  Images reviewed by me.  Radiology report also reviewed.  OB < 14 weeks single or first gestation US   Preliminary Result   IMPRESSION: Single live intrauterine fetus with estimated gestational   age 10 weeks 5 days based on current crown-rump measurement.          Procedures    Medications - No data to display      IMPRESSION       ICD-10-CM    1. Periumbilical pain R10.33    2. Asymptomatic microscopic hematuria R31.21             Medication List      There are no discharge medications for this visit.                       Gabriel Short MD  12/15/19 1941

## 2019-12-15 NOTE — ED AVS SNAPSHOT
[facility] Emergency Department  [address] [address]  Phone:  [phone]  Fax:  [phone]                                    Allie Ramos   MRN: [id number]    Department:  [facility] Emergency Department   Date of Visit:  12/15/2019           After Visit Summary Signature Page    I have received my discharge instructions, and my questions have been answered. I have discussed any challenges I see with this plan with the nurse or doctor.    ..........................................................................................................................................  Patient/Patient Representative Signature      ..........................................................................................................................................  Patient Representative Print Name and Relationship to Patient    ..................................................               ................................................  Date                                   Time    ..........................................................................................................................................  Reviewed by Signature/Title    ...................................................              ..............................................  Date                                               Time          22EPIC Rev 08/18

## 2019-12-15 NOTE — DISCHARGE INSTRUCTIONS
Return to the Emergency Room if the following occurs:     Severely worsened pain, pain that has migrated to the right lower quadrant, fever >101, or for any concern at anytime.    Or, follow-up with the following provider as we discussed:     Return to your primary / OB doctor as scheduled.    Medications discussed:    None new.  No changes.    If you received pain-relieving or sedating medication during your time in the ER, avoid alcohol, driving automobiles, or working with machinery.  Also, a responsible adult must stay with you.        Call the Nurse Advice Line at [phone] or [phone] for any concern at anytime.

## 2019-12-15 NOTE — ED NOTES
Per pt report, cramping to lower abdomen started at about 845 this morning. Pt is 11 weeks pregnant, pt denies vaginally bleeding at this time. Ultrasound done at about 9 weeks and everything was healthy at that time. Pt reports she did have a miscarriage in June 2018 at about 6 weeks. Pt denies nausea, last BM yesterday

## 2019-12-20 ENCOUNTER — MYC REFILL (OUTPATIENT)
Dept: OBGYN | Facility: CLINIC | Age: 29
End: 2019-12-20

## 2019-12-20 DIAGNOSIS — E03.8 SUBCLINICAL HYPOTHYROIDISM: ICD-10-CM

## 2019-12-20 RX ORDER — LEVOTHYROXINE SODIUM 25 UG/1
25 TABLET ORAL DAILY
Qty: 30 TABLET | Refills: 6 | Status: SHIPPED | OUTPATIENT
Start: 2019-12-20 | End: 2020-01-24

## 2019-12-20 NOTE — TELEPHONE ENCOUNTER
TSH   Date Value Ref Range Status   11/11/2019 2.17 0.40 - 4.00 mU/L Final     T4 Free   Date Value Ref Range Status   11/11/2019 0.92 0.76 - 1.46 ng/dL Final     Levothyroxine ordered 10/22/19 due to lower-normal results when trying to become pregnant. Would you like her to remain on this medication? Or other recommendation?     Thank you,   Angel PAGE RN   [facility]

## 2019-12-27 ENCOUNTER — PRENATAL OFFICE VISIT (OUTPATIENT)
Dept: OBGYN | Facility: CLINIC | Age: 29
End: 2019-12-27
Payer: COMMERCIAL

## 2019-12-27 VITALS
DIASTOLIC BLOOD PRESSURE: 69 MMHG | TEMPERATURE: 99 F | WEIGHT: 124 LBS | BODY MASS INDEX: 18.85 KG/M2 | SYSTOLIC BLOOD PRESSURE: 107 MMHG | HEART RATE: 91 BPM

## 2019-12-27 DIAGNOSIS — E03.8 SUBCLINICAL HYPOTHYROIDISM: ICD-10-CM

## 2019-12-27 DIAGNOSIS — Z34.92 PRENATAL CARE, SECOND TRIMESTER: Primary | ICD-10-CM

## 2019-12-27 PROCEDURE — 99207 ZZC PRENATAL VISIT: CPT | Performed by: OBSTETRICS & GYNECOLOGY

## 2019-12-27 RX ORDER — LEVOTHYROXINE SODIUM 25 UG/1
25 TABLET ORAL DAILY
Qty: 30 TABLET | Refills: 6 | Status: CANCELLED | OUTPATIENT
Start: 2019-12-27

## 2019-12-27 RX ORDER — LEVOTHYROXINE SODIUM 50 UG/1
50 TABLET ORAL DAILY
Qty: 90 TABLET | Refills: 4 | Status: SHIPPED | OUTPATIENT
Start: 2019-12-27 | End: 2021-03-29

## 2019-12-27 NOTE — PROGRESS NOTES
"[facility]   OB/GYN Clinic    CC: Return OB     Subjective:    Allie is a 29 year old  at 12w2d by Patient's last menstrual period was 10/02/2019. who presents for return OB visit. She reports feeling well. Denies uterine cramping, vaginal bleeding or leaking, dysuria. +fetal movement.     Pt reports she feels safe at home and mood is ***.     Concerns: ***    Objective:  LMP 10/02/2019     Estimated body mass index is 19.01 kg/m  as calculated from the following:    Height as of 12/15/19: 1.727 m (5' 8\").    Weight as of 12/15/19: 56.7 kg (125 lb).    Physical Exam:  Gen: Pleasant, talkative female in no apparent distress   Respiratory: breathing comfortably on room air   Cardiac: Warm and well-perfused.   GI: Abd soft and non-tender, gravid  MSK: Grossly normal movement of all four extremities  Psych: mood and affect bright   Lower extremity: edema ***not present     Fetal dop tones: *** bpm    Assessment/Plan:   29 year old  at 12w2d by LMP of Patient's last menstrual period was 10/02/2019. who presents for follow-up OB visit.   1) New OB lab; T&S, CBC, HIV, RPR, HepBsAg, Hep B antibody, rubella, GC/Chlam neg. Plan for 3rd tri lab at 28wks.   2) Dating/viability scan confirmed ADE ***, plan for anatomy scan at 18wks  3) Concerns: ***  4) PMH/OBHx problems:    -Subclinical hypothyroidism: takes levothyroxine 25 mcg once daily  5) Medication review: no changes, continue prenatal vitamin   6) PAP smear: last pap 18 normal; repeat in 2021  7) Delivery plan: continue to discuss***, breastfeeding/offered breast pump***, pp contraception, pain management in labor - continue to discuss.   8) Immunizations: s/p flu shot, Tdap at 28wks  9) No increased risk for gestational diabetes for plan for screen at 28wks    Return to clinic in 4 weeks.     Yumiko Cherry, MS3  [employer] [facility] [employer]      "

## 2019-12-27 NOTE — NURSING NOTE
"Initial /69 (BP Location: Right arm, Patient Position: Chair, Cuff Size: Adult Regular)   Pulse 91   Temp 99  F (37.2  C) (Tympanic)   Wt 56.2 kg (124 lb)   LMP 10/02/2019   Breastfeeding No   BMI 18.85 kg/m   Estimated body mass index is 18.85 kg/m  as calculated from the following:    Height as of 12/15/19: 1.727 m (5' 8\").    Weight as of this encounter: 56.2 kg (124 lb). .    Noemí Fernando MA      "

## 2019-12-31 ENCOUNTER — AMBULATORY - HEALTHEAST (OUTPATIENT)
Dept: MATERNAL FETAL MEDICINE | Facility: HOSPITAL | Age: 29
End: 2019-12-31

## 2019-12-31 DIAGNOSIS — O26.90 PREGNANCY, ANTEPARTUM, COMPLICATIONS: ICD-10-CM

## 2020-01-08 ENCOUNTER — TELEPHONE (OUTPATIENT)
Dept: MATERNAL FETAL MEDICINE | Facility: CLINIC | Age: 30
End: 2020-01-08

## 2020-01-08 ENCOUNTER — AMBULATORY - HEALTHEAST (OUTPATIENT)
Dept: MATERNAL FETAL MEDICINE | Facility: HOSPITAL | Age: 30
End: 2020-01-08

## 2020-01-08 NOTE — TELEPHONE ENCOUNTER
MFM received a FTS referral from [facility].  Calls made to patient, messages were left, and no return call from patient.  Patient is now outside of the FTS scheduling window.  Removing orders.  Referring clinic notified.      Laura Ansari

## 2020-01-15 DIAGNOSIS — Z34.92 PRENATAL CARE, SECOND TRIMESTER: ICD-10-CM

## 2020-01-15 PROCEDURE — 99000 SPECIMEN HANDLING OFFICE-LAB: CPT | Performed by: OBSTETRICS & GYNECOLOGY

## 2020-01-15 PROCEDURE — 81511 FTL CGEN ABNOR FOUR ANAL: CPT | Mod: 90 | Performed by: OBSTETRICS & GYNECOLOGY

## 2020-01-15 PROCEDURE — 36415 COLL VENOUS BLD VENIPUNCTURE: CPT | Performed by: OBSTETRICS & GYNECOLOGY

## 2020-01-19 LAB
# FETUSES US: NORMAL
# FETUSES: NORMAL
AFP ADJ MOM AMN: 0.69
AFP SERPL-MCNC: 22 NG/ML
AGE - REPORTED: 28.6 YR
CURRENT SMOKER: NO
CURRENT SMOKER: NO
DIABETES STATUS PATIENT: NO
FAMILY MEMBER DISEASES HX: NO
FAMILY MEMBER DISEASES HX: NO
GA METHOD: NORMAL
GA METHOD: NORMAL
GA: NORMAL WK
HCG MOM SERPL: 0.76
HCG SERPL-ACNC: NORMAL IU/L
HX OF HEREDITARY DISORDERS: NO
IDDM PATIENT QL: NO
INHIBIN A MOM SERPL: 0.61
INHIBIN A SERPL-MCNC: 125 PG/ML
INTEGRATED SCN PATIENT-IMP: NORMAL
IVF PREGNANCY: NO
LMP START DATE: NORMAL
MONOCHORIONIC TWINS: NO
PATHOLOGY STUDY: NORMAL
PREV FETUS DEFECT: NO
SERVICE CMNT-IMP: NO
SPECIMEN DRAWN SERPL: NORMAL
U ESTRIOL MOM SERPL: 0.62
U ESTRIOL SERPL-MCNC: 0.47 NG/ML
VALPROIC/CARBAMAZEPINE STATUS: NO
WEIGHT UNITS: NORMAL

## 2020-01-24 ENCOUNTER — PRENATAL OFFICE VISIT (OUTPATIENT)
Dept: OBGYN | Facility: CLINIC | Age: 30
End: 2020-01-24
Payer: COMMERCIAL

## 2020-01-24 ENCOUNTER — APPOINTMENT (OUTPATIENT)
Dept: LAB | Facility: CLINIC | Age: 30
End: 2020-01-24
Payer: COMMERCIAL

## 2020-01-24 VITALS
DIASTOLIC BLOOD PRESSURE: 70 MMHG | WEIGHT: 128 LBS | SYSTOLIC BLOOD PRESSURE: 107 MMHG | TEMPERATURE: 98.2 F | HEART RATE: 91 BPM | BODY MASS INDEX: 19.46 KG/M2

## 2020-01-24 DIAGNOSIS — Z34.92 PRENATAL CARE, SECOND TRIMESTER: Primary | ICD-10-CM

## 2020-01-24 DIAGNOSIS — E03.8 SUBCLINICAL HYPOTHYROIDISM: ICD-10-CM

## 2020-01-24 LAB — TSH SERPL DL<=0.005 MIU/L-ACNC: 0.86 MU/L (ref 0.4–4)

## 2020-01-24 PROCEDURE — 99207 ZZC PRENATAL VISIT: CPT | Performed by: OBSTETRICS & GYNECOLOGY

## 2020-01-24 PROCEDURE — 84443 ASSAY THYROID STIM HORMONE: CPT | Performed by: OBSTETRICS & GYNECOLOGY

## 2020-01-24 PROCEDURE — 36415 COLL VENOUS BLD VENIPUNCTURE: CPT | Performed by: OBSTETRICS & GYNECOLOGY

## 2020-01-24 NOTE — PROGRESS NOTES
[facility]   OB/GYN Clinic     CC: Return OB      Subjective:     Allie is a 29 year old  at 16w2d by Patient's last menstrual period was 10/02/2019. c/w 7w5d US who presents for return OB visit. She reports feeling well. Denies uterine cramping, vaginal bleeding or leaking, dysuria. No FM.      Objective:  /70 (BP Location: Left arm, Patient Position: Chair, Cuff Size: Adult Regular)   Pulse 91   Temp 98.2  F (36.8  C) (Tympanic)   Wt 58.1 kg (128 lb)   LMP 10/02/2019   Breastfeeding No   BMI 19.46 kg/m         Physical Exam:  Gen: Pleasant, talkative female in no apparent distress   Respiratory: breathing comfortably on room air   Cardiac: Warm and well-perfused.   GI: Abd soft and non-tender, gravid  MSK: Grossly normal movement of all four extremities  Psych: mood and affect bright      FHT: 150s      Assessment/Plan:   29 year old  at 16w2d  by LMP of Patient's last menstrual period was 10/02/2019. c/w 7w5d US who presents for follow-up OB visit.   1) New OB labs nl. Plan for 3rd tri lab at 28wks.   2) Genetics testing: quad neg  3) Plan for anatomy scan at 20wks  4) Concerns: none  5) PMH/OBHx problems: subclinical hypothyroidism - reviewed previous labs and will increase to 50mcg and repeat labs in 6 wks   6) Immunizations: s/p flu      Return to clinic in 4 weeks.      Khadijah Ramires MD  2020 2:53 PM

## 2020-01-24 NOTE — NURSING NOTE
"Initial /70 (BP Location: Left arm, Patient Position: Chair, Cuff Size: Adult Regular)   Pulse 91   Temp 98.2  F (36.8  C) (Tympanic)   Wt 58.1 kg (128 lb)   LMP 10/02/2019   Breastfeeding No   BMI 19.46 kg/m   Estimated body mass index is 19.46 kg/m  as calculated from the following:    Height as of 12/15/19: 1.727 m (5' 8\").    Weight as of this encounter: 58.1 kg (128 lb). .    Noemí Fernando MA    "

## 2020-01-30 ENCOUNTER — OFFICE VISIT (OUTPATIENT)
Dept: DERMATOLOGY | Facility: CLINIC | Age: 30
End: 2020-01-30
Payer: COMMERCIAL

## 2020-01-30 VITALS
HEART RATE: 94 BPM | DIASTOLIC BLOOD PRESSURE: 80 MMHG | RESPIRATION RATE: 16 BRPM | OXYGEN SATURATION: 100 % | SYSTOLIC BLOOD PRESSURE: 115 MMHG

## 2020-01-30 DIAGNOSIS — D48.5 NEOPLASM OF UNCERTAIN BEHAVIOR OF SKIN: Primary | ICD-10-CM

## 2020-01-30 PROCEDURE — 11102 TANGNTL BX SKIN SINGLE LES: CPT | Performed by: PHYSICIAN ASSISTANT

## 2020-01-30 PROCEDURE — 88305 TISSUE EXAM BY PATHOLOGIST: CPT | Mod: TC | Performed by: PHYSICIAN ASSISTANT

## 2020-01-30 PROCEDURE — 88342 IMHCHEM/IMCYTCHM 1ST ANTB: CPT | Mod: TC | Performed by: PHYSICIAN ASSISTANT

## 2020-01-30 PROCEDURE — 99214 OFFICE O/P EST MOD 30 MIN: CPT | Mod: 25 | Performed by: PHYSICIAN ASSISTANT

## 2020-01-30 NOTE — NURSING NOTE
"Initial /80 (BP Location: Right arm, Patient Position: Sitting, Cuff Size: Adult Regular)   Pulse 94   Resp 16   LMP 10/02/2019   SpO2 100%  Estimated body mass index is 19.46 kg/m  as calculated from the following:    Height as of 12/15/19: 1.727 m (5' 8\").    Weight as of 1/24/20: 58.1 kg (128 lb). .    Sandra Pierre, [facility]    "

## 2020-01-30 NOTE — PATIENT INSTRUCTIONS
Wound Care Instructions     FOR SUPERFICIAL WOUNDS     [facility] [phone]    [facility] [phone]                       AFTER 24 HOURS YOU SHOULD REMOVE THE BANDAGE AND BEGIN DAILY DRESSING CHANGES AS FOLLOWS:     1) Remove Dressing.     2) Clean and dry the area with tap water using a Q-tip or sterile gauze pad.     3) Apply Vaseline, Aquaphor, Polysporin ointment or Bacitracin ointment over entire wound.  Do NOT use Neosporin ointment.     4) Cover the wound with a band-aid, or a sterile non-stick gauze pad and micropore paper tape      REPEAT THESE INSTRUCTIONS AT LEAST ONCE A DAY UNTIL THE WOUND HAS COMPLETELY HEALED.    It is an old wives tale that a wound heals better when it is exposed to air and allowed to dry out. The wound will heal faster with a better cosmetic result if it is kept moist with ointment and covered with a bandage.    **Do not let the wound dry out.**      Supplies Needed:      *Cotton tipped applicators (Q-tips)    *Polysporin Ointment or Bacitracin Ointment (NOT NEOSPORIN)    *Band-aids or non-stick gauze pads and micropore paper tape.      PATIENT INFORMATION:    During the healing process you will notice a number of changes. All wounds develop a small halo of redness surrounding the wound.  This means healing is occurring. Severe itching with extensive redness usually indicates sensitivity to the ointment or bandage tape used to dress the wound.  You should call our office if this develops.      Swelling  and/or discoloration around your surgical site is common, particularly when performed around the eye.    All wounds normally drain.  The larger the wound the more drainage there will be.  After 7-10 days, you will notice the wound beginning to shrink and new skin will begin to grow.  The wound is healed when you can see skin has formed over the entire area.  A healed wound has a healthy, shiny look to the surface and is red to dark pink in color  to normalize.  Wounds may take approximately 4-6 weeks to heal.  Larger wounds may take 6-8 weeks.  After the wound is healed you may discontinue dressing changes.    You may experience a sensation of tightness as your wound heals. This is normal and will gradually subside.    Your healed wound may be sensitive to temperature changes. This sensitivity improves with time, but if you re having a lot of discomfort, try to avoid temperature extremes.    Patients frequently experience itching after their wound appears to have healed because of the continue healing under the skin.  Plain Vaseline will help relieve the itching.        POSSIBLE COMPLICATIONS    BLEEDIN. Leave the bandage in place.  2. Use tightly rolled up gauze or a cloth to apply direct pressure over the bandage for 30  minutes.  3. Reapply pressure for an additional 30 minutes if necessary  4. Use additional gauze and tape to maintain pressure once the bleeding has stopped.

## 2020-01-30 NOTE — PROGRESS NOTES
HPI:   Chief complaints: Allie Ramos is a 29 year old female who presents for evaluation of a new mole on her hairline. She has always had a mole but it is recently much darker. Also a mole in the upper back that her  is concerned about.   Condition present for:  recent.   Previous treatments include: none  No Hx of skin cancer; no fhx of skin CA    Shx: 17 weeks pregnant; 1st baby    Review Of Systems  Eyes: negative  Ears/Nose/Throat: negative  Respiratory: No shortness of breath, dyspnea on exertion, cough, or hemoptysis  Cardiovascular: negative  Gastrointestinal: negative  Genitourinary: negative  Musculoskeletal: negative  Neurologic: negative  Psychiatric: negative  Skin: positive for changing mole    This document serves as a record of the services and decisions personally performed and made by Griselda Keane, MS, PA-C. It was created on her behalf by Makayla Gage, a trained medical scribe. The creation of this document is based on the provider's statements to the medical scribe.  Makayla Gage 4:53 PM January 30, 2020    PHYSICAL EXAM:    /80 (BP Location: Right arm, Patient Position: Sitting, Cuff Size: Adult Regular)   Pulse 94   Resp 16   LMP 10/02/2019   SpO2 100%   Skin exam performed as follows: Type 2 skin. Mood appropriate  Alert and Oriented X 3. Well developed, well nourished in no distress.  General appearance: Normal  Head including face: Normal  Eyes: conjunctiva and lids: Normal  Mouth: Lips, teeth, gums: Normal  Neck: Normal  Chest-breast/axillae: Normal  Back: Normal  Spleen and liver: Normal  Cardiovascular: Exam of peripheral vascular system by observation for swelling, varicosities, edema: Normal  Genitalia: groin, buttocks: Normal  Extremities: digits/nails (clubbing): Normal  Eccrine and Apocrine glands: Normal  Right upper extremity: Normal  Left upper extremity: Normal  Right lower extremity: Normal  Left lower extremity: Normal  Skin: Scalp and body hair: See  below    1. 1 mm black macule on the right hairline    ASSESSMENT/PLAN:     1. R/o atypical nevus. Shave biopsy performed.  Area cleaned and anesthetized with 1% lidocaine with epinephrine.  Dermablade used to remove the lesion and sent to pathology. Bleeding was cauterized. Pt tolerated procedure well with no complications.   2. Allie to follow up with Primary Care provider regarding elevated blood pressure.        Follow-up: pending path  CC:   Scribed By: Griselda Keane MS, PABARRY

## 2020-02-05 LAB — COPATH REPORT: NORMAL

## 2020-02-21 ENCOUNTER — PRENATAL OFFICE VISIT (OUTPATIENT)
Dept: OBGYN | Facility: CLINIC | Age: 30
End: 2020-02-21
Payer: COMMERCIAL

## 2020-02-21 ENCOUNTER — HOSPITAL ENCOUNTER (OUTPATIENT)
Dept: ULTRASOUND IMAGING | Facility: CLINIC | Age: 30
Discharge: HOME | End: 2020-02-21
Attending: OBSTETRICS & GYNECOLOGY | Admitting: OBSTETRICS & GYNECOLOGY
Payer: COMMERCIAL

## 2020-02-21 VITALS
HEIGHT: 68 IN | HEART RATE: 81 BPM | DIASTOLIC BLOOD PRESSURE: 78 MMHG | RESPIRATION RATE: 18 BRPM | BODY MASS INDEX: 20.16 KG/M2 | SYSTOLIC BLOOD PRESSURE: 114 MMHG | WEIGHT: 133 LBS | TEMPERATURE: 98.2 F

## 2020-02-21 DIAGNOSIS — E03.8 SUBCLINICAL HYPOTHYROIDISM: ICD-10-CM

## 2020-02-21 DIAGNOSIS — Z34.02 ENCOUNTER FOR PRENATAL CARE IN SECOND TRIMESTER OF FIRST PREGNANCY: Primary | ICD-10-CM

## 2020-02-21 DIAGNOSIS — Z34.92 PRENATAL CARE, SECOND TRIMESTER: ICD-10-CM

## 2020-02-21 PROCEDURE — 99207 ZZC PRENATAL VISIT: CPT | Performed by: OBSTETRICS & GYNECOLOGY

## 2020-02-21 PROCEDURE — 76805 OB US >/= 14 WKS SNGL FETUS: CPT

## 2020-02-21 ASSESSMENT — MIFFLIN-ST. JEOR: SCORE: 1381.78

## 2020-02-21 NOTE — NURSING NOTE
"Initial /78 (BP Location: Right arm, Patient Position: Chair, Cuff Size: Adult Small)   Pulse 81   Temp 98.2  F (36.8  C) (Tympanic)   Resp 18   Ht 1.727 m (5' 8\")   Wt 60.3 kg (133 lb)   LMP 10/02/2019   BMI 20.22 kg/m   Estimated body mass index is 20.22 kg/m  as calculated from the following:    Height as of this encounter: 1.727 m (5' 8\").    Weight as of this encounter: 60.3 kg (133 lb). .      "

## 2020-02-21 NOTE — PROGRESS NOTES
"CC: Here for routine prenatal visit @ 20w2d   HPI:  Had sonogram today==it's a SURPRISE; info given about CB and BF classes    PE: /78 (BP Location: Right arm, Patient Position: Chair, Cuff Size: Adult Small)   Pulse 81   Temp 98.2  F (36.8  C) (Tympanic)   Resp 18   Ht 1.727 m (5' 8\")   Wt 60.3 kg (133 lb)   LMP 10/02/2019   BMI 20.22 kg/m     See OB flowsheet      A:  1. Encounter for prenatal care in second trimester of first pregnancy    - Glucose tolerance gest screen 1 hour; Future  - CBC with platelets; Future  - Treponema Abs w Reflex to RPR and Titer; Future  - **TSH with free T4 reflex FUTURE anytime; Future    2. Subclinical hypothyroidism    - **TSH with free T4 reflex FUTURE anytime; Future      Routine prenatal care  RTC 4 weeks.      Katya Hong M.D.     "

## 2020-03-20 ENCOUNTER — VIRTUAL VISIT (OUTPATIENT)
Dept: OBGYN | Facility: CLINIC | Age: 30
End: 2020-03-20
Payer: COMMERCIAL

## 2020-03-20 DIAGNOSIS — Z34.92 PRENATAL CARE, SECOND TRIMESTER: Primary | ICD-10-CM

## 2020-03-20 PROCEDURE — 99207 ZZC PRENATAL VISIT: CPT | Performed by: OBSTETRICS & GYNECOLOGY

## 2020-03-20 NOTE — PROGRESS NOTES
"Allie Ramos is a 29 year old female who is being evaluated via a billable telephone visit.      The patient has been notified of following:     \"This telephone visit will be conducted via a call between you and your physician/provider. We have found that certain health care needs can be provided without the need for a physical exam.  This service lets us provide the care you need with a short phone conversation.  If a prescription is necessary we can send it directly to your pharmacy.  If lab work is needed we can place an order for that and you can then stop by our lab to have the test done at a later time.    If during the course of the call the physician/provider feels a telephone visit is not appropriate, you will not be charged for this service.\"     Allie Ramos complains of    Chief Complaint   Patient presents with     Prenatal Care     Travel,        I have reviewed and updated the patient's Past Medical History, Social History, Family History and Medication List.    ALLERGIES  Patient has no known allergies.    Additional provider notes:   Feeling well. No ctx,   Cancelling flight to [place].     Assessment/Plan:  28yo  at 24w2d  RTC at 28wks, 3rd labs, Tdap and breast pump script  10 minutes was spent over the phone with the patient, greater than 50% of the time was spent in counseling/coordinating of care as noted above in the A&P.    Kaylee Orozco MD        "

## 2020-04-17 ENCOUNTER — PRENATAL OFFICE VISIT (OUTPATIENT)
Dept: OBGYN | Facility: CLINIC | Age: 30
End: 2020-04-17
Payer: COMMERCIAL

## 2020-04-17 VITALS
DIASTOLIC BLOOD PRESSURE: 83 MMHG | BODY MASS INDEX: 21.75 KG/M2 | HEIGHT: 68 IN | SYSTOLIC BLOOD PRESSURE: 116 MMHG | TEMPERATURE: 97.6 F | WEIGHT: 143.5 LBS | HEART RATE: 99 BPM | RESPIRATION RATE: 16 BRPM

## 2020-04-17 DIAGNOSIS — Z34.02 ENCOUNTER FOR PRENATAL CARE IN SECOND TRIMESTER OF FIRST PREGNANCY: ICD-10-CM

## 2020-04-17 DIAGNOSIS — E03.8 SUBCLINICAL HYPOTHYROIDISM: ICD-10-CM

## 2020-04-17 DIAGNOSIS — Z34.03 ENCOUNTER FOR PRENATAL CARE IN THIRD TRIMESTER OF FIRST PREGNANCY: Primary | ICD-10-CM

## 2020-04-17 LAB
ERYTHROCYTE [DISTWIDTH] IN BLOOD BY AUTOMATED COUNT: 12.2 % (ref 10–15)
GLUCOSE 1H P 50 G GLC PO SERPL-MCNC: 155 MG/DL (ref 60–129)
HCT VFR BLD AUTO: 36.4 % (ref 35–47)
HGB BLD-MCNC: 12.4 G/DL (ref 11.7–15.7)
MCH RBC QN AUTO: 31.9 PG (ref 26.5–33)
MCHC RBC AUTO-ENTMCNC: 34.1 G/DL (ref 31.5–36.5)
MCV RBC AUTO: 94 FL (ref 78–100)
PLATELET # BLD AUTO: 221 10E9/L (ref 150–450)
RBC # BLD AUTO: 3.89 10E12/L (ref 3.8–5.2)
TSH SERPL DL<=0.005 MIU/L-ACNC: 0.77 MU/L (ref 0.4–4)
WBC # BLD AUTO: 12.1 10E9/L (ref 4–11)

## 2020-04-17 PROCEDURE — 90715 TDAP VACCINE 7 YRS/> IM: CPT | Performed by: OBSTETRICS & GYNECOLOGY

## 2020-04-17 PROCEDURE — 90471 IMMUNIZATION ADMIN: CPT | Performed by: OBSTETRICS & GYNECOLOGY

## 2020-04-17 PROCEDURE — 36415 COLL VENOUS BLD VENIPUNCTURE: CPT | Performed by: OBSTETRICS & GYNECOLOGY

## 2020-04-17 PROCEDURE — 85027 COMPLETE CBC AUTOMATED: CPT | Performed by: OBSTETRICS & GYNECOLOGY

## 2020-04-17 PROCEDURE — 86780 TREPONEMA PALLIDUM: CPT | Performed by: OBSTETRICS & GYNECOLOGY

## 2020-04-17 PROCEDURE — 82950 GLUCOSE TEST: CPT | Performed by: OBSTETRICS & GYNECOLOGY

## 2020-04-17 PROCEDURE — 84443 ASSAY THYROID STIM HORMONE: CPT | Performed by: OBSTETRICS & GYNECOLOGY

## 2020-04-17 PROCEDURE — 99207 ZZC PRENATAL VISIT: CPT | Performed by: OBSTETRICS & GYNECOLOGY

## 2020-04-17 RX ORDER — BREAST PUMP
1 EACH MISCELLANEOUS DAILY
Qty: 1 EACH | Refills: 0 | Status: SHIPPED | OUTPATIENT
Start: 2020-04-17 | End: 2021-06-09

## 2020-04-17 ASSESSMENT — MIFFLIN-ST. JEOR: SCORE: 1429.41

## 2020-04-17 NOTE — PROGRESS NOTES
"[facility]   OB/GYN Clinic     CC: Return OB      Subjective:     Allie is a 29 year old  at 28w2d by Patient's last menstrual period was 10/02/2019. c/w 7w5d US who presents for return OB visit. She reports feeling well. Denies uterine cramping, vaginal bleeding or leaking, dysuria. +FM.      Objective:  /83 (BP Location: Left arm, Patient Position: Chair, Cuff Size: Adult Regular)   Pulse 99   Temp 97.6  F (36.4  C) (Tympanic)   Resp 16   Ht 1.727 m (5' 8\")   Wt 65.1 kg (143 lb 8 oz)   LMP 10/02/2019   BMI 21.82 kg/m         Physical Exam:  Gen: Pleasant, talkative female in no apparent distress   Respiratory: breathing comfortably on room air   Cardiac: Warm and well-perfused.   GI: Abd soft and non-tender, gravid  MSK: Grossly normal movement of all four extremities  Psych: mood and affect bright      See OB flowsheet     Assessment/Plan:   29 year old  at 28w2d by LMP of Patient's last menstrual period was 10/02/2019. c/w 7w5d US who presents for follow-up OB visit.   1) New OB labs nl. 3rd tri labs today.   2) Genetics testing: ordered 1st tri, but if she misses this wants QUAD screen   3) nl anatomy   4) Concerns: none  5) PMH/OBHx problems: subclinical hypothyroidism - reviewed previous labs and will increase to 50mcg and repeat labs in 6 wks   6) Immunizations: s/p fl, s/p Tdap      Virtual visit in two weeks, then in-person at 32 wks    Kaylee Orozco MD  OB/GYN  2020           "

## 2020-04-17 NOTE — PROGRESS NOTES
Prior to immunization administration, verified patients identity using patient s name and date of birth. Please see Immunization Activity for additional information.     Screening Questionnaire for Adult Immunization    Are you sick today?   No   Do you have allergies to medications, food, a vaccine component or latex?   No   Have you ever had a serious reaction after receiving a vaccination?   No   Do you have a long-term health problem with heart, lung, kidney, or metabolic disease (e.g., diabetes), asthma, a blood disorder, no spleen, complement component deficiency, a cochlear implant, or a spinal fluid leak?  Are you on long-term aspirin therapy?   No   Do you have cancer, leukemia, HIV/AIDS, or any other immune system problem?   No   Do you have a parent, brother, or sister with an immune system problem?   No   In the past 3 months, have you taken medications that affect  your immune system, such as prednisone, other steroids, or anticancer drugs; drugs for the treatment of rheumatoid arthritis, Crohn s disease, or psoriasis; or have you had radiation treatments?   No   Have you had a seizure, or a brain or other nervous system problem?   Possibly as a child   During the past year, have you received a transfusion of blood or blood    products, or been given immune (gamma) globulin or antiviral drug?   No   For women: Are you pregnant or is there a chance you could become       pregnant during the next month?   Yes   Have you received any vaccinations in the past 4 weeks?   No       Per orders of Dr. Orozco, injection of Tdap given by Ambika Biswas LPN. Patient instructed to remain in clinic for 15 minutes afterwards, and to report any adverse reaction to me immediately.       Screening performed by Ambika Biswas LPN on 4/17/2020 at 2:25 PM.

## 2020-04-17 NOTE — NURSING NOTE
"Initial /83 (BP Location: Left arm, Patient Position: Chair, Cuff Size: Adult Regular)   Pulse 99   Temp 97.6  F (36.4  C) (Tympanic)   Resp 16   Ht 1.727 m (5' 8\")   Wt 65.1 kg (143 lb 8 oz)   LMP 10/02/2019   BMI 21.82 kg/m   Estimated body mass index is 21.82 kg/m  as calculated from the following:    Height as of this encounter: 1.727 m (5' 8\").    Weight as of this encounter: 65.1 kg (143 lb 8 oz). .      "

## 2020-04-18 LAB — T PALLIDUM AB SER QL: NONREACTIVE

## 2020-04-20 DIAGNOSIS — Z34.03 ENCOUNTER FOR PRENATAL CARE IN THIRD TRIMESTER OF FIRST PREGNANCY: Primary | ICD-10-CM

## 2020-04-20 NOTE — RESULT ENCOUNTER NOTE
Call to pt to notify of below.  Unable to reach.  Left message for pt to call back   Future order placed for 3 hr gtt.    Carol Rojas   Ob/Gyn Clinic  RN

## 2020-04-22 DIAGNOSIS — E03.8 SUBCLINICAL HYPOTHYROIDISM: ICD-10-CM

## 2020-04-22 DIAGNOSIS — Z34.03 ENCOUNTER FOR PRENATAL CARE IN THIRD TRIMESTER OF FIRST PREGNANCY: ICD-10-CM

## 2020-04-22 LAB
GLUCOSE 1H P 100 G GLC PO SERPL-MCNC: 130 MG/DL (ref 60–179)
GLUCOSE 2H P 100 G GLC PO SERPL-MCNC: 106 MG/DL (ref 60–154)
GLUCOSE 3H P 100 G GLC PO SERPL-MCNC: 111 MG/DL (ref 60–139)
GLUCOSE P FAST SERPL-MCNC: 81 MG/DL (ref 60–94)
T4 FREE SERPL-MCNC: 0.94 NG/DL (ref 0.76–1.46)
TSH SERPL DL<=0.005 MIU/L-ACNC: 0.91 MU/L (ref 0.4–4)

## 2020-04-22 PROCEDURE — 82951 GLUCOSE TOLERANCE TEST (GTT): CPT | Performed by: OBSTETRICS & GYNECOLOGY

## 2020-04-22 PROCEDURE — 84443 ASSAY THYROID STIM HORMONE: CPT | Performed by: OBSTETRICS & GYNECOLOGY

## 2020-04-22 PROCEDURE — 82952 GTT-ADDED SAMPLES: CPT | Performed by: OBSTETRICS & GYNECOLOGY

## 2020-04-22 PROCEDURE — 36415 COLL VENOUS BLD VENIPUNCTURE: CPT | Performed by: OBSTETRICS & GYNECOLOGY

## 2020-04-22 PROCEDURE — 84439 ASSAY OF FREE THYROXINE: CPT | Performed by: OBSTETRICS & GYNECOLOGY

## 2020-04-30 ENCOUNTER — VIRTUAL VISIT (OUTPATIENT)
Dept: OBGYN | Facility: CLINIC | Age: 30
End: 2020-04-30
Payer: COMMERCIAL

## 2020-04-30 VITALS — BODY MASS INDEX: 21.98 KG/M2 | WEIGHT: 145 LBS | HEIGHT: 68 IN | TEMPERATURE: 98.3 F

## 2020-04-30 DIAGNOSIS — Z34.93 PRENATAL CARE, THIRD TRIMESTER: Primary | ICD-10-CM

## 2020-04-30 PROCEDURE — 99207 ZZC PRENATAL VISIT: CPT | Performed by: OBSTETRICS & GYNECOLOGY

## 2020-04-30 ASSESSMENT — MIFFLIN-ST. JEOR: SCORE: 1436.22

## 2020-04-30 NOTE — PROGRESS NOTES
"Allie Ramos is a 29 year old female who is being evaluated via a billable telephone visit.      The patient has been notified of following:     \"This telephone visit will be conducted via a call between you and your physician/provider. We have found that certain health care needs can be provided without the need for a physical exam.  This service lets us provide the care you need with a short phone conversation.  If a prescription is necessary we can send it directly to your pharmacy.  If lab work is needed we can place an order for that and you can then stop by our lab to have the test done at a later time.    Telephone visits are billed at different rates depending on your insurance coverage. During this emergency period, for some insurers they may be billed the same as an in-person visit.  Please reach out to your insurance provider with any questions.    If during the course of the call the physician/provider feels a telephone visit is not appropriate, you will not be charged for this service.\"    Patient has given verbal consent for Telephone visit?  Yes    How would you like to obtain your AVS? Sherice[employer]    Phone call duration: 10 minutes    [facility]   OB/GYN Clinic     CC: Return OB      Subjective:     Allie is a 29 year old  at 30w1d  who presents for return OB visit. She reports feeling well. Denies uterine cramping, vaginal bleeding or leaking, dysuria. +FM.      Objective:  Deferred virtual     Assessment/Plan:   29 year old  at 30w1d by LMP of Patient's last menstrual period was 10/02/2019. c/w 7w5d US who presents for follow-up OB visit.   1) New OB labs nl. Failed GCT, passed GTT  2) subclinical hypothyroidism- recent TSH/T4 WNL  3) nl anatomy   4) Concerns: none  5) Immunizations: s/p flu, s/p Tdap      In person visit in 2 weeks      Khadijah Ramires MD   2020 12:15 PM      "

## 2020-05-14 ENCOUNTER — PRENATAL OFFICE VISIT (OUTPATIENT)
Dept: OBGYN | Facility: CLINIC | Age: 30
End: 2020-05-14
Payer: COMMERCIAL

## 2020-05-14 VITALS
TEMPERATURE: 97.2 F | HEIGHT: 68 IN | SYSTOLIC BLOOD PRESSURE: 126 MMHG | RESPIRATION RATE: 18 BRPM | HEART RATE: 101 BPM | DIASTOLIC BLOOD PRESSURE: 86 MMHG | BODY MASS INDEX: 22.58 KG/M2 | WEIGHT: 149 LBS

## 2020-05-14 DIAGNOSIS — Z34.03 ENCOUNTER FOR PRENATAL CARE IN THIRD TRIMESTER OF FIRST PREGNANCY: Primary | ICD-10-CM

## 2020-05-14 PROBLEM — O03.9 SPONTANEOUS ABORTION: Status: RESOLVED | Noted: 2018-06-21 | Resolved: 2020-05-14

## 2020-05-14 PROCEDURE — 99207 ZZC PRENATAL VISIT: CPT | Performed by: OBSTETRICS & GYNECOLOGY

## 2020-05-14 ASSESSMENT — MIFFLIN-ST. JEOR: SCORE: 1454.36

## 2020-05-14 NOTE — NURSING NOTE
"Initial /86 (BP Location: Right arm, Patient Position: Chair, Cuff Size: Adult Regular)   Pulse 101   Temp 97.2  F (36.2  C) (Tympanic)   Resp 18   Ht 1.727 m (5' 8\")   Wt 67.6 kg (149 lb)   LMP 10/02/2019   BMI 22.66 kg/m   Estimated body mass index is 22.66 kg/m  as calculated from the following:    Height as of this encounter: 1.727 m (5' 8\").    Weight as of this encounter: 67.6 kg (149 lb). .      "

## 2020-05-14 NOTE — PROGRESS NOTES
"CC: Here for routine prenatal visit @ 32w1d   HPI:   No complaints; discussed sun burn and bug bite prevention; adequate fluid intake and pain management in labor    PE: /86 (BP Location: Right arm, Patient Position: Chair, Cuff Size: Adult Regular)   Pulse 101   Temp 97.2  F (36.2  C) (Tympanic)   Resp 18   Ht 1.727 m (5' 8\")   Wt 67.6 kg (149 lb)   LMP 10/02/2019   BMI 22.66 kg/m     See OB flowsheet      A:  1. Encounter for prenatal care in third trimester of first pregnancy        Routine prenatal care  RTC 2 weeks, virtual visit      Katya Hong M.D.     "

## 2020-05-28 ENCOUNTER — VIRTUAL VISIT (OUTPATIENT)
Dept: OBGYN | Facility: CLINIC | Age: 30
End: 2020-05-28
Payer: COMMERCIAL

## 2020-05-28 VITALS — BODY MASS INDEX: 22.05 KG/M2 | WEIGHT: 145 LBS

## 2020-05-28 DIAGNOSIS — Z34.03 ENCOUNTER FOR PRENATAL CARE IN THIRD TRIMESTER OF FIRST PREGNANCY: Primary | ICD-10-CM

## 2020-05-28 PROCEDURE — 99207 ZZC PRENATAL VISIT: CPT | Mod: TEL | Performed by: OBSTETRICS & GYNECOLOGY

## 2020-05-28 NOTE — PROGRESS NOTES
"Allie Ramos is a 29 year old female who is being evaluated via a billable telephone visit.      The patient has been notified of following:     \"This telephone visit will be conducted via a call between you and your physician/provider. We have found that certain health care needs can be provided without the need for a physical exam.  This service lets us provide the care you need with a short phone conversation.  If a prescription is necessary we can send it directly to your pharmacy.  If lab work is needed we can place an order for that and you can then stop by our lab to have the test done at a later time.    Telephone visits are billed at different rates depending on your insurance coverage. During this emergency period, for some insurers they may be billed the same as an in-person visit.  Please reach out to your insurance provider with any questions.    If during the course of the call the physician/provider feels a telephone visit is not appropriate, you will not be charged for this service.\"    Patient has given verbal consent for Telephone visit?  Yes    What phone number would you like to be contacted at? [phone]      How would you like to obtain your AVS? [employer]    CC: Here for routine prenatal visit @ 34w1d.   discuss if it is ok to get together with parents being in 3rd trimester and COVID restrictions.     HPI: + FM, no ctx, no LOF, no VB.  No complaints.     A/P  @ 34w1d normal pregnancy    1. Routine prenatal care.  COVID restrictions and recommendations reviewed including iron supplementation.  Discussed that social distancing does include parents, but that they need to determine their own risk factors and risk aversion.      RTC 2 weeks for GBS and exam    Kristin Smith M.D.      Phone call duration: 11 minutes          "

## 2020-06-11 ENCOUNTER — PRENATAL OFFICE VISIT (OUTPATIENT)
Dept: OBGYN | Facility: CLINIC | Age: 30
End: 2020-06-11
Payer: COMMERCIAL

## 2020-06-11 VITALS
BODY MASS INDEX: 22.5 KG/M2 | HEART RATE: 87 BPM | DIASTOLIC BLOOD PRESSURE: 68 MMHG | TEMPERATURE: 97.5 F | WEIGHT: 148 LBS | SYSTOLIC BLOOD PRESSURE: 104 MMHG

## 2020-06-11 DIAGNOSIS — Z34.03 ENCOUNTER FOR PRENATAL CARE IN THIRD TRIMESTER OF FIRST PREGNANCY: Primary | ICD-10-CM

## 2020-06-11 PROCEDURE — 87653 STREP B DNA AMP PROBE: CPT | Performed by: OBSTETRICS & GYNECOLOGY

## 2020-06-11 PROCEDURE — 99207 ZZC PRENATAL VISIT: CPT | Performed by: OBSTETRICS & GYNECOLOGY

## 2020-06-11 NOTE — NURSING NOTE
"Initial /68 (BP Location: Right arm, Patient Position: Chair, Cuff Size: Adult Large)   Pulse 87   Temp 97.5  F (36.4  C) (Tympanic)   Wt 67.1 kg (148 lb)   LMP 10/02/2019   Breastfeeding No   BMI 22.50 kg/m   Estimated body mass index is 22.5 kg/m  as calculated from the following:    Height as of 5/14/20: 1.727 m (5' 8\").    Weight as of this encounter: 67.1 kg (148 lb). .    Noemí Fernando MA    "

## 2020-06-12 LAB
GP B STREP DNA SPEC QL NAA+PROBE: NEGATIVE
SPECIMEN SOURCE: NORMAL

## 2020-06-15 ENCOUNTER — NURSE TRIAGE (OUTPATIENT)
Dept: NURSING | Facility: CLINIC | Age: 30
End: 2020-06-15

## 2020-06-15 ENCOUNTER — HOSPITAL ENCOUNTER (INPATIENT)
Facility: CLINIC | Age: 30
LOS: 2 days | Discharge: HOME | End: 2020-06-17
Attending: OBSTETRICS & GYNECOLOGY | Admitting: OBSTETRICS & GYNECOLOGY
Payer: COMMERCIAL

## 2020-06-15 ENCOUNTER — ANESTHESIA (OUTPATIENT)
Dept: OBGYN | Facility: CLINIC | Age: 30
End: 2020-06-15
Payer: COMMERCIAL

## 2020-06-15 ENCOUNTER — ANESTHESIA EVENT (OUTPATIENT)
Dept: OBGYN | Facility: CLINIC | Age: 30
End: 2020-06-15
Payer: COMMERCIAL

## 2020-06-15 PROBLEM — O42.90 PROM (PREMATURE RUPTURE OF MEMBRANES): Status: ACTIVE | Noted: 2020-06-15

## 2020-06-15 PROBLEM — Z34.00 SUPERVISION OF NORMAL FIRST PREGNANCY: Status: ACTIVE | Noted: 2020-06-15

## 2020-06-15 LAB
ABO + RH BLD: NORMAL
ABO + RH BLD: NORMAL
BASOPHILS # BLD AUTO: 0.1 10E9/L (ref 0–0.2)
BASOPHILS NFR BLD AUTO: 0.6 %
BLD GP AB SCN SERPL QL: NORMAL
BLOOD BANK CMNT PATIENT-IMP: NORMAL
DIFFERENTIAL METHOD BLD: ABNORMAL
EOSINOPHIL # BLD AUTO: 0.1 10E9/L (ref 0–0.7)
EOSINOPHIL NFR BLD AUTO: 0.7 %
ERYTHROCYTE [DISTWIDTH] IN BLOOD BY AUTOMATED COUNT: 11.8 % (ref 10–15)
HCT VFR BLD AUTO: 37 % (ref 35–47)
HGB BLD-MCNC: 12.2 G/DL (ref 11.7–15.7)
IMM GRANULOCYTES # BLD: 0.3 10E9/L (ref 0–0.4)
IMM GRANULOCYTES NFR BLD: 2.2 %
LYMPHOCYTES # BLD AUTO: 2.5 10E9/L (ref 0.8–5.3)
LYMPHOCYTES NFR BLD AUTO: 19.6 %
MCH RBC QN AUTO: 30.3 PG (ref 26.5–33)
MCHC RBC AUTO-ENTMCNC: 33 G/DL (ref 31.5–36.5)
MCV RBC AUTO: 92 FL (ref 78–100)
MONOCYTES # BLD AUTO: 1.1 10E9/L (ref 0–1.3)
MONOCYTES NFR BLD AUTO: 8.8 %
NEUTROPHILS # BLD AUTO: 8.6 10E9/L (ref 1.6–8.3)
NEUTROPHILS NFR BLD AUTO: 68.1 %
NRBC # BLD AUTO: 0 10*3/UL
NRBC BLD AUTO-RTO: 0 /100
PLATELET # BLD AUTO: 257 10E9/L (ref 150–450)
RBC # BLD AUTO: 4.03 10E12/L (ref 3.8–5.2)
RUPTURE OF FETAL MEMBRANES BY ROM PLUS: POSITIVE
SPECIMEN EXP DATE BLD: NORMAL
WBC # BLD AUTO: 12.6 10E9/L (ref 4–11)

## 2020-06-15 PROCEDURE — 85025 COMPLETE CBC W/AUTO DIFF WBC: CPT | Performed by: OBSTETRICS & GYNECOLOGY

## 2020-06-15 PROCEDURE — 25000128 H RX IP 250 OP 636: Performed by: NURSE ANESTHETIST, CERTIFIED REGISTERED

## 2020-06-15 PROCEDURE — 86780 TREPONEMA PALLIDUM: CPT | Performed by: OBSTETRICS & GYNECOLOGY

## 2020-06-15 PROCEDURE — 25000125 ZZHC RX 250: Performed by: OBSTETRICS & GYNECOLOGY

## 2020-06-15 PROCEDURE — 25000128 H RX IP 250 OP 636: Performed by: OBSTETRICS & GYNECOLOGY

## 2020-06-15 PROCEDURE — 37000011 ZZH ANESTHESIA WARD SERVICE

## 2020-06-15 PROCEDURE — U0003 INFECTIOUS AGENT DETECTION BY NUCLEIC ACID (DNA OR RNA); SEVERE ACUTE RESPIRATORY SYNDROME CORONAVIRUS 2 (SARS-COV-2) (CORONAVIRUS DISEASE [COVID-19]), AMPLIFIED PROBE TECHNIQUE, MAKING USE OF HIGH THROUGHPUT TECHNOLOGIES AS DESCRIBED BY CMS-2020-01-R: HCPCS | Performed by: OBSTETRICS & GYNECOLOGY

## 2020-06-15 PROCEDURE — 86901 BLOOD TYPING SEROLOGIC RH(D): CPT | Performed by: OBSTETRICS & GYNECOLOGY

## 2020-06-15 PROCEDURE — 0KQM0ZZ REPAIR PERINEUM MUSCLE, OPEN APPROACH: ICD-10-PCS | Performed by: OBSTETRICS & GYNECOLOGY

## 2020-06-15 PROCEDURE — 25800030 ZZH RX IP 258 OP 636: Performed by: OBSTETRICS & GYNECOLOGY

## 2020-06-15 PROCEDURE — 59400 OBSTETRICAL CARE: CPT | Performed by: OBSTETRICS & GYNECOLOGY

## 2020-06-15 PROCEDURE — 84112 EVAL AMNIOTIC FLUID PROTEIN: CPT | Performed by: OBSTETRICS & GYNECOLOGY

## 2020-06-15 PROCEDURE — 37000011 ZZH ANESTHESIA WARD SERVICE: Performed by: NURSE ANESTHETIST, CERTIFIED REGISTERED

## 2020-06-15 PROCEDURE — 72200001 ZZH LABOR CARE VAGINAL DELIVERY SINGLE

## 2020-06-15 PROCEDURE — 3E0R3BZ INTRODUCTION OF ANESTHETIC AGENT INTO SPINAL CANAL, PERCUTANEOUS APPROACH: ICD-10-PCS | Performed by: OBSTETRICS & GYNECOLOGY

## 2020-06-15 PROCEDURE — 12000000 ZZH R&B MED SURG/OB

## 2020-06-15 PROCEDURE — 86850 RBC ANTIBODY SCREEN: CPT | Performed by: OBSTETRICS & GYNECOLOGY

## 2020-06-15 PROCEDURE — 25000132 ZZH RX MED GY IP 250 OP 250 PS 637: Performed by: OBSTETRICS & GYNECOLOGY

## 2020-06-15 PROCEDURE — 25000125 ZZHC RX 250: Performed by: NURSE ANESTHETIST, CERTIFIED REGISTERED

## 2020-06-15 PROCEDURE — 36415 COLL VENOUS BLD VENIPUNCTURE: CPT | Performed by: OBSTETRICS & GYNECOLOGY

## 2020-06-15 PROCEDURE — 86900 BLOOD TYPING SEROLOGIC ABO: CPT | Performed by: OBSTETRICS & GYNECOLOGY

## 2020-06-15 PROCEDURE — 00HU33Z INSERTION OF INFUSION DEVICE INTO SPINAL CANAL, PERCUTANEOUS APPROACH: ICD-10-PCS | Performed by: OBSTETRICS & GYNECOLOGY

## 2020-06-15 PROCEDURE — 40000671 ZZH STATISTIC ANESTHESIA CASE

## 2020-06-15 RX ORDER — METHYLERGONOVINE MALEATE 0.2 MG/ML
200 INJECTION INTRAVENOUS
Status: DISCONTINUED | OUTPATIENT
Start: 2020-06-15 | End: 2020-06-16

## 2020-06-15 RX ORDER — OXYTOCIN/0.9 % SODIUM CHLORIDE 30/500 ML
100-340 PLASTIC BAG, INJECTION (ML) INTRAVENOUS CONTINUOUS PRN
Status: COMPLETED | OUTPATIENT
Start: 2020-06-15 | End: 2020-06-15

## 2020-06-15 RX ORDER — EPHEDRINE SULFATE 50 MG/ML
5 INJECTION, SOLUTION INTRAMUSCULAR; INTRAVENOUS; SUBCUTANEOUS
Status: DISCONTINUED | OUTPATIENT
Start: 2020-06-15 | End: 2020-06-16

## 2020-06-15 RX ORDER — ONDANSETRON 2 MG/ML
4 INJECTION INTRAMUSCULAR; INTRAVENOUS EVERY 6 HOURS PRN
Status: DISCONTINUED | OUTPATIENT
Start: 2020-06-15 | End: 2020-06-16

## 2020-06-15 RX ORDER — CARBOPROST TROMETHAMINE 250 UG/ML
250 INJECTION, SOLUTION INTRAMUSCULAR
Status: DISCONTINUED | OUTPATIENT
Start: 2020-06-15 | End: 2020-06-16

## 2020-06-15 RX ORDER — LIDOCAINE HYDROCHLORIDE 10 MG/ML
INJECTION, SOLUTION EPIDURAL; INFILTRATION; INTRACAUDAL; PERINEURAL
Status: COMPLETED
Start: 2020-06-15 | End: 2020-06-15

## 2020-06-15 RX ORDER — BUPIVACAINE HYDROCHLORIDE 2.5 MG/ML
INJECTION, SOLUTION EPIDURAL; INFILTRATION; INTRACAUDAL PRN
Status: DISCONTINUED | OUTPATIENT
Start: 2020-06-15 | End: 2020-06-15

## 2020-06-15 RX ORDER — SODIUM CHLORIDE, SODIUM LACTATE, POTASSIUM CHLORIDE, CALCIUM CHLORIDE 600; 310; 30; 20 MG/100ML; MG/100ML; MG/100ML; MG/100ML
INJECTION, SOLUTION INTRAVENOUS CONTINUOUS
Status: DISCONTINUED | OUTPATIENT
Start: 2020-06-15 | End: 2020-06-16

## 2020-06-15 RX ORDER — FENTANYL CITRATE 50 UG/ML
INJECTION, SOLUTION INTRAMUSCULAR; INTRAVENOUS
Status: COMPLETED
Start: 2020-06-15 | End: 2020-06-15

## 2020-06-15 RX ORDER — FENTANYL CITRATE 50 UG/ML
INJECTION, SOLUTION INTRAMUSCULAR; INTRAVENOUS PRN
Status: DISCONTINUED | OUTPATIENT
Start: 2020-06-15 | End: 2020-06-15

## 2020-06-15 RX ORDER — BETAMETHASONE SODIUM PHOSPHATE AND BETAMETHASONE ACETATE 3; 3 MG/ML; MG/ML
12 INJECTION, SUSPENSION INTRA-ARTICULAR; INTRALESIONAL; INTRAMUSCULAR; SOFT TISSUE EVERY 24 HOURS
Status: DISCONTINUED | OUTPATIENT
Start: 2020-06-15 | End: 2020-06-16

## 2020-06-15 RX ORDER — NALBUPHINE HYDROCHLORIDE 10 MG/ML
2.5-5 INJECTION, SOLUTION INTRAMUSCULAR; INTRAVENOUS; SUBCUTANEOUS EVERY 6 HOURS PRN
Status: DISCONTINUED | OUTPATIENT
Start: 2020-06-15 | End: 2020-06-16

## 2020-06-15 RX ORDER — ACETAMINOPHEN 325 MG/1
650 TABLET ORAL EVERY 4 HOURS PRN
Status: DISCONTINUED | OUTPATIENT
Start: 2020-06-15 | End: 2020-06-16

## 2020-06-15 RX ORDER — BUPIVACAINE HYDROCHLORIDE 2.5 MG/ML
INJECTION, SOLUTION EPIDURAL; INFILTRATION; INTRACAUDAL
Status: COMPLETED
Start: 2020-06-15 | End: 2020-06-15

## 2020-06-15 RX ORDER — MISOPROSTOL 100 UG/1
25 TABLET ORAL
Status: DISCONTINUED | OUTPATIENT
Start: 2020-06-15 | End: 2020-06-16

## 2020-06-15 RX ORDER — OXYCODONE AND ACETAMINOPHEN 5; 325 MG/1; MG/1
1 TABLET ORAL
Status: DISCONTINUED | OUTPATIENT
Start: 2020-06-15 | End: 2020-06-16

## 2020-06-15 RX ORDER — ONDANSETRON 2 MG/ML
4 INJECTION INTRAMUSCULAR; INTRAVENOUS EVERY 6 HOURS PRN
Status: DISCONTINUED | OUTPATIENT
Start: 2020-06-15 | End: 2020-06-15

## 2020-06-15 RX ORDER — NALOXONE HYDROCHLORIDE 0.4 MG/ML
.1-.4 INJECTION, SOLUTION INTRAMUSCULAR; INTRAVENOUS; SUBCUTANEOUS
Status: DISCONTINUED | OUTPATIENT
Start: 2020-06-15 | End: 2020-06-16

## 2020-06-15 RX ORDER — TRANEXAMIC ACID 10 MG/ML
1 INJECTION, SOLUTION INTRAVENOUS EVERY 30 MIN PRN
Status: DISCONTINUED | OUTPATIENT
Start: 2020-06-15 | End: 2020-06-16

## 2020-06-15 RX ORDER — IBUPROFEN 800 MG/1
800 TABLET, FILM COATED ORAL
Status: COMPLETED | OUTPATIENT
Start: 2020-06-15 | End: 2020-06-15

## 2020-06-15 RX ORDER — LIDOCAINE 40 MG/G
CREAM TOPICAL
Status: DISCONTINUED | OUTPATIENT
Start: 2020-06-15 | End: 2020-06-16

## 2020-06-15 RX ORDER — LEVOTHYROXINE SODIUM 50 UG/1
50 TABLET ORAL DAILY
Status: DISCONTINUED | OUTPATIENT
Start: 2020-06-15 | End: 2020-06-16

## 2020-06-15 RX ORDER — LIDOCAINE HYDROCHLORIDE 10 MG/ML
INJECTION, SOLUTION INFILTRATION; PERINEURAL PRN
Status: DISCONTINUED | OUTPATIENT
Start: 2020-06-15 | End: 2020-06-15

## 2020-06-15 RX ORDER — FENTANYL CITRATE 50 UG/ML
50-100 INJECTION, SOLUTION INTRAMUSCULAR; INTRAVENOUS
Status: DISCONTINUED | OUTPATIENT
Start: 2020-06-15 | End: 2020-06-16

## 2020-06-15 RX ORDER — LIDOCAINE HYDROCHLORIDE AND EPINEPHRINE 15; 5 MG/ML; UG/ML
INJECTION, SOLUTION EPIDURAL PRN
Status: DISCONTINUED | OUTPATIENT
Start: 2020-06-15 | End: 2020-06-15

## 2020-06-15 RX ORDER — OXYTOCIN 10 [USP'U]/ML
10 INJECTION, SOLUTION INTRAMUSCULAR; INTRAVENOUS
Status: DISCONTINUED | OUTPATIENT
Start: 2020-06-15 | End: 2020-06-16

## 2020-06-15 RX ADMIN — LIDOCAINE HYDROCHLORIDE AND EPINEPHRINE 75 MG: 15; 5 INJECTION, SOLUTION EPIDURAL at 19:18

## 2020-06-15 RX ADMIN — SODIUM CHLORIDE, POTASSIUM CHLORIDE, SODIUM LACTATE AND CALCIUM CHLORIDE 500 ML: 600; 310; 30; 20 INJECTION, SOLUTION INTRAVENOUS at 10:42

## 2020-06-15 RX ADMIN — Medication: at 19:43

## 2020-06-15 RX ADMIN — SODIUM CHLORIDE, POTASSIUM CHLORIDE, SODIUM LACTATE AND CALCIUM CHLORIDE 500 ML: 600; 310; 30; 20 INJECTION, SOLUTION INTRAVENOUS at 17:15

## 2020-06-15 RX ADMIN — IBUPROFEN 800 MG: 800 TABLET ORAL at 22:51

## 2020-06-15 RX ADMIN — BUPIVACAINE HYDROCHLORIDE 5 ML: 2.5 INJECTION, SOLUTION EPIDURAL; INFILTRATION; INTRACAUDAL at 19:22

## 2020-06-15 RX ADMIN — LEVOTHYROXINE SODIUM 50 MCG: 50 TABLET ORAL at 22:42

## 2020-06-15 RX ADMIN — FENTANYL CITRATE 100 MCG: 50 INJECTION, SOLUTION INTRAMUSCULAR; INTRAVENOUS at 19:22

## 2020-06-15 RX ADMIN — BUPIVACAINE HYDROCHLORIDE 5 ML: 2.5 INJECTION, SOLUTION EPIDURAL; INFILTRATION; INTRACAUDAL at 19:28

## 2020-06-15 RX ADMIN — LIDOCAINE HYDROCHLORIDE 50 MG: 10 INJECTION, SOLUTION INFILTRATION; PERINEURAL at 19:05

## 2020-06-15 RX ADMIN — SODIUM CHLORIDE, POTASSIUM CHLORIDE, SODIUM LACTATE AND CALCIUM CHLORIDE: 600; 310; 30; 20 INJECTION, SOLUTION INTRAVENOUS at 19:33

## 2020-06-15 RX ADMIN — BETAMETHASONE SODIUM PHOSPHATE AND BETAMETHASONE ACETATE 12 MG: 3; 3 INJECTION, SUSPENSION INTRA-ARTICULAR; INTRALESIONAL; INTRAMUSCULAR at 10:49

## 2020-06-15 RX ADMIN — Medication 25 MCG: at 11:37

## 2020-06-15 RX ADMIN — Medication 340 ML/HR: at 21:24

## 2020-06-15 RX ADMIN — Medication: at 19:25

## 2020-06-15 ASSESSMENT — ENCOUNTER SYMPTOMS: SEIZURES: 1

## 2020-06-15 ASSESSMENT — MIFFLIN-ST. JEOR: SCORE: 1459.35

## 2020-06-15 NOTE — ANESTHESIA PREPROCEDURE EVALUATION
Anesthesia Pre-Procedure Evaluation    Patient: Allie Ramos   MRN: [id number] : 1990          Preoperative Diagnosis: * No surgery found *        Past Medical History:   Diagnosis Date     Chickenpox      Febrile seizure (H)     x1     Past Surgical History:   Procedure Laterality Date     EYE SURGERY      left eye     MOUTH SURGERY      wisdom teeth     TONSILLECTOMY & ADENOIDECTOMY      age 8       Anesthesia Evaluation       history and physical reviewed . Pt has had prior anesthetic. Type: General    No history of anesthetic complications          ROS/MED HX    ENT/Pulmonary:  - neg pulmonary ROS     Neurologic:     (+)seizures features: Febrile seizure x1,     Cardiovascular:  - neg cardiovascular ROS       METS/Exercise Tolerance:  >4 METS   Hematologic:  - neg hematologic  ROS       Musculoskeletal:  - neg musculoskeletal ROS       GI/Hepatic:  - neg GI/hepatic ROS       Renal/Genitourinary:  - ROS Renal section negative       Endo:     (+) thyroid problem .      Psychiatric:  - neg psychiatric ROS       Infectious Disease:  - neg infectious disease ROS       Malignancy:      - no malignancy   Other:    (+) Possibly pregnant                    neg OB ROS            Physical Exam  Normal systems: cardiovascular, pulmonary and dental    Airway   Mallampati: I  TM distance: >3 FB  Neck ROM: full  Mouth opening: > 3 cm    Dental   (+) lower retainer and upper retainer    Cardiovascular       Pulmonary             Lab Results   Component Value Date    WBC 12.6 (H) 06/15/2020    HGB 12.2 06/15/2020    HCT 37.0 06/15/2020     06/15/2020     12/15/2019    POTASSIUM 3.9 12/15/2019    CHLORIDE 109 12/15/2019    CO2 27 12/15/2019    BUN 5 (L) 12/15/2019    CR 0.53 12/15/2019    GLC 83 12/15/2019    BUCK 9.2 12/15/2019    ALBUMIN 4.4 12/15/2019    PROTTOTAL 7.7 12/15/2019    ALT 21 12/15/2019    AST 16 12/15/2019    ALKPHOS 34 (L) 12/15/2019    BILITOTAL 0.6 12/15/2019    TSH 0.91 2020    T4  "0.94 04/22/2020       Preop Vitals  BP Readings from Last 3 Encounters:   06/15/20 125/74   06/11/20 104/68   05/14/20 126/86    Pulse Readings from Last 3 Encounters:   06/15/20 98   06/11/20 87   05/14/20 101      Resp Readings from Last 3 Encounters:   06/15/20 20   05/14/20 18   04/17/20 16    SpO2 Readings from Last 3 Encounters:   01/30/20 100%   12/15/19 99%   11/11/19 99%      Temp Readings from Last 1 Encounters:   06/15/20 36.5  C (97.7  F) (Oral)    Ht Readings from Last 1 Encounters:   06/15/20 1.727 m (5' 8\")      Wt Readings from Last 1 Encounters:   06/15/20 68.1 kg (150 lb 1.6 oz)    Estimated body mass index is 22.82 kg/m  as calculated from the following:    Height as of this encounter: 1.727 m (5' 8\").    Weight as of this encounter: 68.1 kg (150 lb 1.6 oz).       Anesthesia Plan      History & Physical Review  History and physical reviewed and following examination; no interval change.    ASA Status:  2 .  OB Epidural Asa: 2       Plan for Epidural   PONV prophylaxis:  Ondansetron (or other 5HT-3) and Dexamethasone or Solumedrol         Postoperative Care  Postoperative pain management:  IV analgesics, Oral pain medications, Neuraxial analgesia and Multi-modal analgesia.      Consents  Anesthetic plan, risks, benefits and alternatives discussed with:  Patient..                 KARO Ornelas CRNA  "

## 2020-06-15 NOTE — PROGRESS NOTES
Pt on tele-walking in room  Reviewed family folder and breastfeeding course.  Parents consent to vitamin k, EES and hepatitis b vaccine

## 2020-06-15 NOTE — PROGRESS NOTES
1635 pt up to bathroom.  Tried standing at edge of bed and rocking after.  Contractions every 2 min.  Pt labored breathing.  Fetal tracing decreased when pt bent over.  Enc to get back into bed.  Cervix 90%.  On left side.

## 2020-06-15 NOTE — PROGRESS NOTES
Pt arrived around 0924.  Placed on monitor/oriented to room.  ROM done which was positive.  Reviewed admission folder and seen by Dr Orozco

## 2020-06-15 NOTE — PROGRESS NOTES
Iv in/betamethasone given per order and pt placed back on monitor for 20 min strip before starting Cytotec.  After 500cc given pt saline locked and drinking plenty of fluids.  Corona virus swab sent.

## 2020-06-15 NOTE — TELEPHONE ENCOUNTER
"Patient was calling back because she wanted to know if she should bring everything with her to Labor and Delivery. I told her yes, she should plan on staying and do bring everything because they won't let her or her significant other leave. She said \"ok\".  Nahomy Gunderson RN  [facility]    Additional Information    Negative: Nursing judgment    Negative: Nursing judgment    Negative: Nursing judgment    Negative: Nursing judgment    Information only question and nurse able to answer    Protocols used: NO PROTOCOL AVAILABLE - INFORMATION ONLY-A-[place]      "

## 2020-06-15 NOTE — PROGRESS NOTES
This writer in to check on pt.  Breathing through each contraction coming every 1-2 min.  Iv connected and restarted a bolus.  Pt on side now.  Cervix is 1/80/-2.  Enc pt to rest

## 2020-06-15 NOTE — H&P
[facility] Labor and Delivery H&P  Joy 15, 2020  Allie Ramos  [id number]      HPI: Allie Ramos is a 29 year old  at 36w5d by LMP c/w 7w5d US who presents with PPROM at 0500 this morning. She has been having some mild cramps since that time. No VB. Active baby.     Pregnancy notable for:  --subclinical hypothyroidism; on synthroid replacement     OBHX:   OB History    Para Term  AB Living   2 0 0 0 1 0   SAB TAB Ectopic Multiple Live Births   0 0 0 0 0      # Outcome Date GA Lbr Dylan/2nd Weight Sex Delivery Anes PTL Lv   2 Current            1 AB 18 5w1d    SAB          MedicalHX:   Past Medical History:   Diagnosis Date     Chickenpox      Febrile seizure (H)     x1       SurgicalHX:   Past Surgical History:   Procedure Laterality Date     EYE SURGERY      left eye     MOUTH SURGERY      wisdom teeth     TONSILLECTOMY & ADENOIDECTOMY      age 8       Medications:   No current facility-administered medications on file prior to encounter.   levothyroxine (SYNTHROID/LEVOTHROID) 50 MCG tablet, Take 1 tablet (50 mcg) by mouth daily  Prenatal Vit-Fe Fumarate-FA (PRENATAL MULTIVITAMIN PLUS IRON) 27-0.8 MG TABS per tablet, Take 1 tablet by mouth daily        Allergies:  No Known Allergies    FamilyHX:  Family History   Problem Relation Age of Onset     Diabetes Maternal Grandmother      Heart Surgery Maternal Grandmother      Other - See Comments Maternal Grandfather         CJD ?       SocialHX:   Social History     Socioeconomic History     Marital status:      Spouse name: Not on file     Number of children: Not on file     Years of education: Not on file     Highest education level: Not on file   Occupational History     Not on file   Social Needs     Financial resource strain: Not on file     Food insecurity     Worry: Not on file     Inability: Not on file     Transportation needs     Medical: Not on file     Non-medical: Not on file   Tobacco Use     Smoking status: Never  "Smoker     Smokeless tobacco: Never Used   Substance and Sexual Activity     Alcohol use: Not Currently     Comment: occas-quit with pregnancy     Drug use: No     Sexual activity: Yes     Partners: Male     Birth control/protection: None   Lifestyle     Physical activity     Days per week: Not on file     Minutes per session: Not on file     Stress: Not on file   Relationships     Social connections     Talks on phone: Not on file     Gets together: Not on file     Attends Worship service: Not on file     Active member of club or organization: Not on file     Attends meetings of clubs or organizations: Not on file     Relationship status: Not on file     Intimate partner violence     Fear of current or ex partner: Not on file     Emotionally abused: Not on file     Physically abused: Not on file     Forced sexual activity: Not on file   Other Topics Concern     Parent/sibling w/ CABG, MI or angioplasty before 65F 55M? Not Asked   Social History Narrative     Not on file       ROS: 10-point ROS negative except as in HPI     Physical Exam:  Vitals:    06/15/20 0837 06/15/20 1000   BP: 122/83    BP Location: Right arm    Pulse: 98    Resp: 16    Temp: 98.5  F (36.9  C)    TempSrc: Oral    Weight:  68.1 kg (150 lb 1.6 oz)   Height: 1.727 m (5' 8\")      GEN: resting comfortably in bed, NAD   CV: Regular rate, warm and well-perfused  PULM: no increased work of breathing  ABD: soft, gravid, non-tender, non-distended  EXT: no edema, non-tender to palpation  CVX: closed/30 in clinic  Presentation: cephalic by leopold;s  EFW: 6 lbs  Membranes: PPROM, clear fluid     NST:  FHT: baseline nl, mod variability, + accels, no decels  TOCO: 1 contraction every 5-10 min, pt reports as mild    Labs:      Lab Results   Component Value Date    ABO A 11/27/2019    RH Pos 11/27/2019    AS Neg 11/27/2019    HEPBANG Nonreactive 11/27/2019    CHPCRT Negative 11/27/2019    GCPCRT Negative 11/27/2019    HGB 12.4 04/17/2020       GBS Status: "   Lab Results   Component Value Date    GBS Negative 2020       Lab Results   Component Value Date    PAP NIL 2018       A/P: Allie Ramos is a 29 year old female  at 36w5d by LMP c/w 7w5d US, admitted for PPROM.     Admit to L&D. Place PIV. Draw labs: T&S, CBC, RPR.   PPROM: I did discuss moving forward with labor induction to prevent intra-amniotic infection.   Labor: Anticipate . Given unripe cervix, I did recommend oral misoprostol, followed by pitocin for labor induction once favorable. These steps were discussed in detail.   FWB: Category 1 FHT.  Continue EFM and toco. Plan for BMZ injection for fetal lung maturity given gest age <37wks   Pain: Desires epidural for analgesia  PNC: Rh POS, Rubella immune, GBS neg    Kaylee Orozco MD  OB/GYN

## 2020-06-15 NOTE — TELEPHONE ENCOUNTER
"I gave report to Labor and Delivery. They told me to tell the patient to go thru the ER. If they have a mask, wear it or pick one up at the ER. Her significant other can come (only one person). Bring all supplies with because if that person leaves, they can't come back. I told this to the patient. Her water broke this morning. She is 36 wk, 5 days pregnant.  Nahomy Gunderson RN  [facility]      Reason for Disposition    Leakage of fluid from vagina    Additional Information    Negative: [1] SEVERE abdominal pain (e.g., excruciating) AND [2] constant AND [3] present > 1 hour    Negative: Severe bleeding (e.g., continuous red blood from vagina, or large blood clots)    Negative: Umbilical cord hanging out of the vagina (shiny, white, curled appearance, \"like telephone cord\")    Negative: Uncontrollable urge to push (i.e., feels like baby is coming out now)    Negative: Can see baby    Negative: Sounds like a life-threatening emergency to the triager    Negative: < 20 weeks pregnant    Negative: Vaginal bleeding    Protocols used: PREGNANCY - RUPTURE OF [id number]      "

## 2020-06-15 NOTE — PROGRESS NOTES
Pt off of monitor to take a short tub soak.  Md was in unit.  Notified that pt had a prolonged decel of 2 min over an hour ago due to contractions on top of each other.  Notified of cervix and bolus/position changes.  Category 1 tracing prior to off for tub

## 2020-06-16 LAB
SARS-COV-2 RNA SPEC QL NAA+PROBE: NOT DETECTED
SPECIMEN SOURCE: NORMAL
T PALLIDUM AB SER QL: NONREACTIVE

## 2020-06-16 PROCEDURE — 12000000 ZZH R&B MED SURG/OB

## 2020-06-16 PROCEDURE — 25000132 ZZH RX MED GY IP 250 OP 250 PS 637: Performed by: OBSTETRICS & GYNECOLOGY

## 2020-06-16 RX ORDER — AMOXICILLIN 250 MG
1 CAPSULE ORAL 2 TIMES DAILY
Status: DISCONTINUED | OUTPATIENT
Start: 2020-06-16 | End: 2020-06-17 | Stop reason: HOSPADM

## 2020-06-16 RX ORDER — BISACODYL 10 MG
10 SUPPOSITORY, RECTAL RECTAL DAILY PRN
Status: DISCONTINUED | OUTPATIENT
Start: 2020-06-17 | End: 2020-06-17 | Stop reason: HOSPADM

## 2020-06-16 RX ORDER — IBUPROFEN 800 MG/1
800 TABLET, FILM COATED ORAL EVERY 6 HOURS PRN
Status: DISCONTINUED | OUTPATIENT
Start: 2020-06-16 | End: 2020-06-17 | Stop reason: HOSPADM

## 2020-06-16 RX ORDER — ACETAMINOPHEN 325 MG/1
650 TABLET ORAL EVERY 4 HOURS PRN
Status: DISCONTINUED | OUTPATIENT
Start: 2020-06-16 | End: 2020-06-17 | Stop reason: HOSPADM

## 2020-06-16 RX ORDER — OXYTOCIN 10 [USP'U]/ML
10 INJECTION, SOLUTION INTRAMUSCULAR; INTRAVENOUS
Status: DISCONTINUED | OUTPATIENT
Start: 2020-06-16 | End: 2020-06-17 | Stop reason: HOSPADM

## 2020-06-16 RX ORDER — HYDROCORTISONE 2.5 %
CREAM (GRAM) TOPICAL 3 TIMES DAILY PRN
Status: DISCONTINUED | OUTPATIENT
Start: 2020-06-16 | End: 2020-06-17 | Stop reason: HOSPADM

## 2020-06-16 RX ORDER — OXYTOCIN/0.9 % SODIUM CHLORIDE 30/500 ML
100 PLASTIC BAG, INJECTION (ML) INTRAVENOUS CONTINUOUS
Status: DISCONTINUED | OUTPATIENT
Start: 2020-06-16 | End: 2020-06-17 | Stop reason: HOSPADM

## 2020-06-16 RX ORDER — OXYTOCIN/0.9 % SODIUM CHLORIDE 30/500 ML
340 PLASTIC BAG, INJECTION (ML) INTRAVENOUS CONTINUOUS PRN
Status: DISCONTINUED | OUTPATIENT
Start: 2020-06-16 | End: 2020-06-17 | Stop reason: HOSPADM

## 2020-06-16 RX ORDER — TRANEXAMIC ACID 10 MG/ML
1 INJECTION, SOLUTION INTRAVENOUS EVERY 30 MIN PRN
Status: DISCONTINUED | OUTPATIENT
Start: 2020-06-16 | End: 2020-06-17 | Stop reason: HOSPADM

## 2020-06-16 RX ORDER — AMOXICILLIN 250 MG
2 CAPSULE ORAL 2 TIMES DAILY
Status: DISCONTINUED | OUTPATIENT
Start: 2020-06-16 | End: 2020-06-17 | Stop reason: HOSPADM

## 2020-06-16 RX ORDER — MODIFIED LANOLIN
OINTMENT (GRAM) TOPICAL
Status: DISCONTINUED | OUTPATIENT
Start: 2020-06-16 | End: 2020-06-17 | Stop reason: HOSPADM

## 2020-06-16 RX ORDER — NALOXONE HYDROCHLORIDE 0.4 MG/ML
.1-.4 INJECTION, SOLUTION INTRAMUSCULAR; INTRAVENOUS; SUBCUTANEOUS
Status: DISCONTINUED | OUTPATIENT
Start: 2020-06-16 | End: 2020-06-17 | Stop reason: HOSPADM

## 2020-06-16 RX ADMIN — IBUPROFEN 800 MG: 800 TABLET ORAL at 05:49

## 2020-06-16 RX ADMIN — DOCUSATE SODIUM AND SENNOSIDES 1 TABLET: 8.6; 5 TABLET, FILM COATED ORAL at 09:13

## 2020-06-16 RX ADMIN — DOCUSATE SODIUM AND SENNOSIDES 2 TABLET: 8.6; 5 TABLET, FILM COATED ORAL at 19:23

## 2020-06-16 RX ADMIN — IBUPROFEN 800 MG: 800 TABLET ORAL at 19:22

## 2020-06-16 RX ADMIN — IBUPROFEN 800 MG: 800 TABLET ORAL at 12:35

## 2020-06-16 NOTE — L&D DELIVERY NOTE
"Delivery Summary    Allie Ramos MRN# [id number]   Age: 29 year old YOB: 1990     ASSESSMENT & PLAN: Ms. Ramos is a 28 yo  who [facility] 36w5d by LMP c/w with 7w5d US with  mature rupture of membranes. Her pregnancy was complicated by subclinical hypothyroidism. I did start a course of betamethasone for fetal lung maturity. She was received a single dose of misoprostol for cervical ripening and then began to labor spontaneously. An epidural was placed for pain management. Please see notes below abut FHT. She progressed to complete dilation, pushed to a slow crown and delivered a vigorous male infant vertex, MARTIR via . APGARs 8 and 8. Weight pending due to skin-to-skin contact. The placenta delivered via gentle cord traction and was noted to be intact with a 3V cord. No signs of abruption. She had a 2nd degree laceration that was repaired with 3-0 vicryl. Mom and baby were stable for transport to postpartum.   \"Mil\"        Labor Event Times    Labor onset date:  6/15/20 Onset time:   5:00 AM   Dilation complete date:  6/15/20 Complete time:   8:19 PM   Start pushing date/time:  6/15/2020 2019      Labor Length    1st Stage (hrs):  15 (min):  19   2nd Stage (hrs):  1 (min):  6   3rd Stage (hrs):  0 (min):  5      Labor Events     labor?:  Yes   steroids:  Rescue Course  Labor Type:  Spontaneous  Predominate monitoring during 1st stage:  continuous electronic fetal monitoring     Antibiotics received during labor?:  No     Rupture identifier:  Sac 1  Rupture date/time: 6/15/20 0500   Rupture type:  Spontaneous rupture of membranes occuring during spontaneous labor or augmentation  Fluid color:  Clear  Fluid odor:  Normal     1:1 continuous labor support provided by?:  RN Labor partogram used?:  no      Delivery/Placenta Date and Time    Delivery Date:  6/15/20 Delivery Time:   9:25 PM   Placenta Date/Time:  6/15/2020  9:31 PM  Oxytocin given " at the time of delivery:  after delivery of baby     Vaginal Counts     Initial count performed by 2 team members:   Two Team Members   Pam Conway       Needles Suture [place] Sponges Instruments   Initial counts 2  5    Added to count  1     Final counts 2 1 5    Placed during labor Accounted for at the end of labor   Yes    No NA   No NA    Final count performed by 2 team members:   Two Team Members   Pam Conway      Final count correct?:  Yes     Apgars    Living status:  Living   1 Minute 5 Minute 10 Minute 15 Minute 20 Minute   Skin color: 0  0       Heart rate: 2  2       Reflex irritability: 2  2       Muscle tone: 2  2       Respiratory effort: 2  2       Total: 8  8       Apgars assigned by:  TIA ORO RN     Cord    Vessels:  3 Vessels Complications:  None   Cord Blood Disposition:  Lab Gases Sent?:  No      [place] Resuscitation    Methods:  None     Skin to Skin and Feeding Plan    Skin to skin initiation date/time: 1841    Skin to skin with:  Mother  Skin to skin end date/time:     How do you plan to feed your baby:  Breastfeeding     Labor Events and Shoulder Dystocia    Fetal Tracing Prior to Delivery:  Category 2  Fetal Tracing Comments:  Cat 1 FHT for the latent stage of labor (isolated late decel), then once patient transitioned into active labor she had a 13 min prolonged decel down to the 90s. She was then noted to have complete cervical dilation and started to push. She progressed well through the 2nd stage of labor, but was noted to have recurrent decels (mostly earlys, but occasional variable and late decels). She maintained moderate variability and +accels throughout.   Shoulder dystocia present?:  Neg     Delivery (Maternal) (Provider to Complete) (553212)    Episiotomy:  None  Perineal lacerations:  2nd Repaired?:  Yes      Blood Loss  Mother: Allie Ramos #[id number]   Start of Mother's Information    IO Blood Loss  06/15/20 0500 - 06/15/20 2200    None           End  of Mother's Information  Mother: Allie Ramos #[id number]         Delivery - Provider to Complete (205318)    Delivering clinician:  Kaylee Orozco MD  Attempted Delivery Types (Choose all that apply):  Spontaneous Vaginal Delivery  Delivery Type (Choose the 1 that will go to the Birth History):  Vaginal, Spontaneous   Other personnel:   Provider Role   Melanie Saavedra, RN Delivery Nurse   Josefina Conway, KAREN Charge Nurse         Placenta    Delayed Cord Clamping:  Done  Date/Time:  6/15/2020  9:31 PM  Removal:  Spontaneous  Comments:  3V cord, intact, no signs of abruption   Disposition:  Hospital disposal     Anesthesia    Method:  Epidural  Cervical dilation at placement:  0-3          Presentation and Position    Presentation:  Vertex  Position:  Right Occiput Anterior           Kaylee Orozco MD

## 2020-06-16 NOTE — PLAN OF CARE
FHTs bradycardic/prolonged decel for x14 minutes. Dr. Orozco called. Right side, left side, right side, hands and knees position changes. Additional nursing staff called for help. Bolus started. O2 placed. FSE placed. SVE performed, patient found to have only rim left. Patient prepared for vaginal delivery.

## 2020-06-16 NOTE — PROGRESS NOTES
"PPD # 1    S: patient without complaints  O: /63 (BP Location: Left arm)   Pulse 98   Temp 98.5  F (36.9  C) (Oral)   Resp 16   Ht 1.727 m (5' 8\")   Wt 68.1 kg (150 lb 1.6 oz)   LMP 10/02/2019   SpO2 100%   Breastfeeding Unknown   BMI 22.82 kg/m     NAD  Abd: soft, nontender, fundus firm  Ext: calves nontender    A/P PPD # 1 s/p     Routine PP care.  Anticipate discharge tomorrow    Kristin Smith M.D.    "

## 2020-06-16 NOTE — PLAN OF CARE
Up to BR for pericare. Pt was able to void. Mother and baby transferred to postpartum unit at 0000 via ambulatory and bassinet after completion of immediate recovery period. Patient oriented to room and instructed to call for assistance when up to the bathroom the next time. Mother and baby bonding well and in stable condition upon transfer.

## 2020-06-16 NOTE — PLAN OF CARE
S:Delivery  B:Spontaneous Labor,36w5d    Lab Results   Component Value Date    GBS Negative 2020    with antibiotic treatment not indicated 4 hours prior to delivery.  A: Patient delivered   lac 2nd degree at 5 with Dr. MEKA Orozco in attendance and baby placed on mother's abdomen for delayed cord clamping. Baby dried and stimulated. Baby placed  skin to skin @ 5. Apgars 8/8.  IV infusion of Oxytocin  infused. Placenta removal spontaneous. MD does not want placenta sent to pathology.  See Flowsheet for VS and PP checks. Delivery QBL (mL): 50 mL.  Labor care plan goals met, transition now to postpartum care.  R: Expect routine postpartum care. Anticipate first feeding within the hour or whenever infant displays feeding cues. Continue skin to skin. Prior discussion with mother indicates that feeding plan is Breast feeding . Educated mother on importance of exclusive breastfeeding, expected feeding readiness cues and encouraged her to observe for these cues while rooming in. Informed her that breastfeeding assistance would be provided.

## 2020-06-16 NOTE — PROGRESS NOTES
After tub bath pt returned to bed contractions continue to get stronger.  Multiple position changes.  Bilateral side lying release/hands and knees and high fowlers position.  Pt ambulated to bathroom and anesthesia here for epidural placement.  Consent signed and pt tolerated well.  Melanie RN in to take over pt care.

## 2020-06-16 NOTE — ANESTHESIA PROCEDURE NOTES
Procedure note : epidural catheter  Staff -       CRNA: Delmi Macdonald APRN CRNA    Performed By: CRNA        Pre-Procedure    Location: OB    Procedure Times:6/15/2020 7:00 PM and 6/15/2020 7:12 PM  Pre-Anesthestic Checklist: patient identified, IV checked, risks and benefits discussed, informed consent, monitors and equipment checked, pre-op evaluation and at physician/surgeon's request    Timeout  Correct Patient: Yes   Correct Procedure: Yes   Correct Site: Yes   Correct Laterality: N/A   Correct Position: Yes   Site Marked: N/A   .   Procedure Documentation    Diagnosis:Pain.    Procedure: epidural catheter, .   Patient Position:sitting Insertion Site:L3-4  (midline approach) Injection technique: LORT saline   Local skin infiltrated with mL of 1% lidocaine.      Patient Prep/Sterile Barriers; mask, sterile gloves, patient draped.  .  Needle: Touhy needle   Needle Gauge: 17.    Needle Length (Inches) 3.5   # of attempts: 1 and # of redirects:  .    Catheter: 19 G . .  Catheter threaded easily  4 cm epidural space.  9 cm at skin.   .    Assessment/Narrative  Paresthesias: No.  .  .  Aspiration negative for heme or CSF  . Test dose of 5 mL lidocaine 1.5% w/ 1:200,000 epinephrine at 19:18.  Test dose negative for signs of intravascular, subdural or intrathecal injection. Comments:  VAS pain score prior to epidural:7/10    VAS pain score after epidural:4/10    Pt. Tolerated well, FHR stable.

## 2020-06-17 VITALS
HEART RATE: 72 BPM | OXYGEN SATURATION: 98 % | BODY MASS INDEX: 22.75 KG/M2 | RESPIRATION RATE: 18 BRPM | TEMPERATURE: 98.1 F | WEIGHT: 150.1 LBS | SYSTOLIC BLOOD PRESSURE: 115 MMHG | DIASTOLIC BLOOD PRESSURE: 76 MMHG | HEIGHT: 68 IN

## 2020-06-17 PROCEDURE — 25000132 ZZH RX MED GY IP 250 OP 250 PS 637: Performed by: OBSTETRICS & GYNECOLOGY

## 2020-06-17 RX ORDER — IBUPROFEN 200 MG
600 TABLET ORAL EVERY 6 HOURS PRN
COMMUNITY
Start: 2020-06-17 | End: 2020-07-21

## 2020-06-17 RX ADMIN — IBUPROFEN 800 MG: 800 TABLET ORAL at 08:17

## 2020-06-17 RX ADMIN — IBUPROFEN 800 MG: 800 TABLET ORAL at 01:33

## 2020-06-17 RX ADMIN — IBUPROFEN 800 MG: 800 TABLET ORAL at 14:11

## 2020-06-17 RX ADMIN — ACETAMINOPHEN 650 MG: 325 TABLET, FILM COATED ORAL at 01:34

## 2020-06-17 RX ADMIN — DOCUSATE SODIUM AND SENNOSIDES 1 TABLET: 8.6; 5 TABLET, FILM COATED ORAL at 08:17

## 2020-06-17 NOTE — DISCHARGE INSTRUCTIONS
MAKE AN APPOINTMENT IN 6 WEEKS, SOONER IF PHYSICAL OR EMOTIONAL CONCERNS.  Storing Expressed Milk    You can express your milk and store it in clean containers. Your family or a sitter can feed it to the baby. This way, your baby gets the benefits of your milk even when you can't be there at feeding time.  Type of storage Storage times   Room temperature       At room temperature (up to 78 F or 26 C)    Tip: Keep the container clean, covered, and cool. 3 to 4 hours is best; 6 to 8 hours is acceptable under very clean conditions   Refrigerator       In a refrigerator (less than 39 F or less than 4 C)    Tip: Place milk in the back of the main section of the refrigerator. 72 hours is best; up to 8 days is acceptable under very clean conditions   Freezer        In a freezer (0 F or -17 C)    Tip: Store milk toward the back of the freezer. 6 months is best; 12 months is acceptable   Guidelines for milk storage  Always use a clean container to collect and store milk. Never pour warm expressed milk into a bottle with cold milk. And be sure to label and date each bottle or bag of milk. To store milk safely, see the chart above.  Warming stored milk  Thaw frozen milk in the refrigerator or in a bowl of warm water. It s a good idea to warm refrigerated milk before using it. For your baby s safety:    Use the oldest milk first.    Warm a container of milk by putting it in a bowl of warm (not hot) water for a few minutes. Or use a bottle warmer set on low.    Gently swirl the milk to mix it. Then place a few drops on your wrist. The milk should be near room temperature.    Don t put the milk in a microwave. This could create pockets of hot liquid that can burn your baby s mouth.  Date Last Reviewed: 3/1/2017    2664-1630 [employer]. [address] [place] [address], [place] [address]. All rights reserved. This information is not intended as a substitute for professional medical care. Always follow your healthcare  professional's instructions.        Breastfeeding FAQs  How often should I nurse?  Feed your  whenever he or she show signs of hunger. A general guideline is to nurse around 8 to 12 times every 24 hours, or about every 2 to 3 hours. With time and patience, every mother-baby pair will develop their own schedule and feeding pattern.  How many months should I nurse?  The American College of Obstetricians and Gynecologists and the American Academy of Pediatrics both recommend that mothers start breastfeeding as soon as possible after birth. Both support  breastfeeding-only  for the first 6 months of life. At 6 months, your baby may slowly start having solid foods as well. But continue breastfeeding at least through the baby s first birthday. After the first birthday, you and your baby can stop or continue breastfeeding as long as you want it to happen.  Is my baby getting enough milk?  There are a few ways to check if your baby is getting enough milk.  Latching on  You know your baby is getting milk if you hear gulping and swallowing sounds, not just sucking. Look for your baby steadily moving his or her jaw open and closed as another sign of proper  latching on.   Urine output and stool frequency  You can also tell how much milk your baby is getting by keeping track of your baby s diapers. By the end of the first week of life:    Your baby should have about 1 wet diaper on day 1, and  2 wet diapers on day 2. This should increase each day by 1 more wet diaper, up to 6 wet diapers on day 6 and then about 6 wet diapers every day. The urine will be a pale yellow color, not dark yellow or orange. Wet diapers should stay around this amount as your  baby gets older.    Your baby should have 3 or 4 stools per day. It is not uncommon for a  baby to pass stool after each feeding. In the second to fourth week of life, the number of stools can increase to about 5 per day. After 1 month, the number of  stools usually lessens. It might be 1 or 2 a day. Some babies may have a day or days with no stool. In these cases, stool should be in larger amounts when it is passed.     Weight  It s normal for your baby to lose some weight during the first 3 to 4 days of life. A baby might lose up to 7% of his or her birth weight during this time. Then your baby should start gaining again. By the end of the second week, he or she will back to birth weight.  Does my baby need vitamins?  All  infants should get vitamin D supplements. Your baby s healthcare provider will prescribe them. This may be at least 400 international units (IU) a day. Your baby usually will not need any other supplements. When your baby is 6 months old, you may start to offer baby foods that contain iron.  What should I do if my breasts become swollen, tender, or sore?  These symptoms are most often because your breasts are too full of milk (engorged). You are making more milk than your baby is drinking. This may cause pain and make it harder for your baby to nurse. If this happens, try the following:    Keep nursing. This is a temporary condition. It gets better once you can get your baby to drink more milk. Be sure to breastfeed more often and let your baby feed until he or she is finished.    Express some milk before you breastfeed. Do this manually or with a breast pump. This will soften the darker area around the nipple (areola) so your baby can latch deeper to begin feeding.    Use a warm compress. This can be a towel or paper diaper soaked in warm water. Or take a warm shower before feeding. Some women have found that switching off between a cold compress and a warm one gives them relief. If you feel comfortable with this, you can try it too. If you use an ice pack, wrap it in a thin towel to protect your skin.    Take acetaminophen for continued pain. The medicine is safe to take every now and then during breastfeeding.  If your nipples or  breasts continue to hurt, call your healthcare provider. Nipple and breast problems need to be looked at and treated as soon as possible. But don t get discouraged and stop nursing.    When to seek medical advice  Unless your baby s healthcare provider advises otherwise, call the provider right away if your baby has any of the following:    Fever (see Fever and children, below)    Repeated vomiting    Does not appear to be alert, refuses to nurse, or is sleeping too much, such as through feedings    Has signs of dehydration. These include fewer wet diapers than normal or no urine for 8 hours, or the urine appears dark. Or your baby has no tears when crying,  sunken eyes,  or dry mouth.    Is not gaining weight or losing weight  Call your own healthcare provider right away if you:    Have a fever of 100.4 F (38 C) or higher, or as directed by your provider    Have redness, warmth, pain, or unusual discharge from your breasts    Have such painful nipples and breasts that you want to stop nursing    Have a hard lump in your breast    Have lower belly (abdominal) pain or cramping when you are not breastfeeding    Have pain or burning when you pass urine    Have unexpected vaginal bleeding or foul-smelling discharge  Fever and children  Always use a digital thermometer to check your child s temperature. Never use a mercury thermometer.  For infants and toddlers, be sure to use a rectal thermometer correctly. A rectal thermometer may accidentally poke a hole in (perforate) the rectum. It may also pass on germs from the stool. Always follow the product maker s directions for proper use. If you don t feel comfortable taking a rectal temperature, use another method. When you talk to your child s healthcare provider, tell him or her which method you used to take your child s temperature.  Here are guidelines for fever temperature. Ear temperatures aren t accurate before 6 months of age. Don t take an oral temperature until  your child is at least 4 years old.  Infant under 3 months old:    Ask your child s healthcare provider how you should take the temperature.    Rectal or forehead (temporal artery) temperature of 100.4 F (38 C) or higher, or as directed by the provider    Armpit temperature of 99 F (37.2 C) or higher, or as directed by the provider   Date Last Reviewed: 3/1/2017    [phone] [employer]. [address] [place] [address]. All rights reserved. This information is not intended as a substitute for professional medical care. Always follow your healthcare professional's instructions.        Common Questions About Breastfeeding    Here are answers to some questions new mothers often ask.  Is my baby getting enough milk?  When it comes to feeding your baby, what goes in must come out. You can tell how much milk your baby is getting by keeping track of the baby s diapers:    By the first 24 hours after birth: The baby should have 1 to 2 wet diapers and 1 to 2 soiled (poopy) diapers. The poop will be dark and tar-like (meconium).    The second and third day after birth: The baby should have 3 to 4 wet diapers and 2 to 3 soiled diapers. The poop will be greenish brown (transitional stool).    After the first 4 or 5 days: The baby should have at least 5 to 6 wet diapers and at least 3 to 4 soiled diapers a day. The poop will be yellow and loose.  How can I tell when my baby s hungry?  Don t wait until your baby cries to feed him or her. Newborns should be nursed as soon as they show any hunger signs. These include:    Increased alertness or activity    Rooting reflex (nuzzling against your breast)    Smacking lips or opening and closing the mouth    Sucking on the hand or fingers    Crying (late sign)  How often should I feed my baby?  Feed your baby as often and as long as he or she wants. Make sure you re nursing at least 8 to 12 times per day. Some of these feedings might be close together (cluster  "feeding), and then your baby might rest for several hours. Let your baby nurse as long as he or she would like; when done, he or she will stop swallowing, relax his or her hands and fall asleep. If your baby hasn't nursed in 4 hours, you may need to wake your baby and offer your milk. Newborns tend to be very sleepy and sometimes will not wake to eat. If your baby doesn't seem interested in nursing, place him or her in just diapers against your bare skin (skin to skin) and continue to offer your milk. And, if your baby fusses when feeding, don't worry. Some babies get distracted easily. To calm your baby, choose a quiet place for feeding. It may also help if you breastfeed in the same place in your home each time. If your baby is crying, it may be difficult for him or her to latch on. Gently place your finger in the mouth to help him or her feel calm, and then offer your milk again.  Will I spoil my baby?  Newborns can't be spoiled. When your baby needs comfort, food, or holding, his or her crying will let you know. When you respond to your baby's needs, you help him or her learn to trust you. This is a time to shower your baby with love and attend to his or her needs.  Why is my baby so hungry?  Babies eat a lot. Their stomachs are very small when they are born, and mother's milk is easily and quickly digested. This is even truer during a growth spurt. Growth spurts usually happen around 2 and 6 weeks of age. They happen again at 3 and 6 months. During these times, your baby will breastfeed more often. Don t be alarmed. Your baby will not need formula or supplements. You will make all the milk that your baby needs because milk production is a \"supply and demand\" situation (baby's demand will increase mom's supply).  Date Last Reviewed: 2/1/2018 2000-2019 [employer]. [address] [place] [address], [place] [address]. All rights reserved. This information is not intended as a substitute for professional " medical care. Always follow your healthcare professional's instructions.      Postpartum Vaginal Delivery Instructions    Activity       Ask family and friends for help when you need it.    Do not place anything in your vagina for 6 weeks.    You are not restricted on other activities, but take it easy for a few weeks to allow your body to recover from delivery.  You are able to do any activities you feel up to that point.    No driving until you have stopped taking your pain medications (usually two weeks after delivery).     Call your health care provider if you have any of these symptoms:       Increased pain, swelling, redness, or fluid around your stiches from an episiotomy or perineal tear.    A fever above 100.4 F (38 C) with or without chills when placing a thermometer under your tongue.    You soak a sanitary pad with blood within 1 hour, or you see blood clots larger than a golf ball.    Bleeding that lasts more than 6 weeks.    Vaginal discharge that smells bad.    Severe pain, cramping or tenderness in your lower belly area.    A need to urinate more frequently (use the toilet more often), more urgently (use the toilet very quickly), or it burns when you urinate.    Nausea and vomiting.    Redness, swelling or pain around a vein in your leg.    Problems breastfeeding or a red or painful area on your breast.    Chest pain and cough or are gasping for air.    Problems coping with sadness, anxiety, or depression.  If you have any concerns about hurting yourself or the baby, call your provider immediately.     You have questions or concerns after you return home.     Keep your hands clean:  Always wash your hands before touching your perineal area and stitches.  This helps reduce your risk of infection.  If your hands aren't dirty, you may use an alcohol hand-rub to clean your hands. Keep your nails clean and short.

## 2020-06-17 NOTE — PLAN OF CARE
Data: Vital signs within normal limits. Postpartum checks within normal limits - see flow record. Patient eating and drinking normally. Patient able to empty bladder independently. . Patient ambulating independently..   No apparent signs of infection. Lac 2nd degree healing well. Patient Is performing self cares and Is able to care for infant. Positive attachment behaviors are observed with infant. Support persons are present.  Action:  Pain plan was discussed. Patient would like pain meds to be brought in when they are due. Patient was medicated during the shift for generalized soreness and tender perineum. See MAR.Patient education done about hand hygiene, breastfeeding,  cares, postpartum cares, pain management/plan,  safety, and rest. See flow record.   Response:   Patient reassessed within 1 hour after each medication for pain. Patient stated that pain had improved. Patient stated that she was comfortable. .   Plan: Anticipate discharge on 2020.

## 2020-06-17 NOTE — DISCHARGE SUMMARY
[facility] Discharge Summary    Allie Ramos MRN# [id number]   Age: 29 year old YOB: 1990     Date of Admission:  6/15/2020  Date of Discharge::  6/17/2020  To boarder status  Admitting Physician:  Kaylee Orozco MD  Discharge Physician:  Salvador Ramirez MD     Home clinic: [facility]          Admission Diagnoses:    pregnancy  Supervision of normal first pregnancy  PROM (premature rupture of membranes)  Postpartum care and examination          Discharge Diagnosis:   Normal spontaneous vaginal delivery  Intrauterine pregnancy at 36+5 weeks gestation          Procedures:   Procedure(s): Repair of second degree perineal laceration       No other procedures performed during this admission           Medications Prior to Admission:     Medications Prior to Admission   Medication Sig Dispense Refill Last Dose     levothyroxine (SYNTHROID/LEVOTHROID) 50 MCG tablet Take 1 tablet (50 mcg) by mouth daily 90 tablet 4 6/14/2020 at Unknown time     Misc. Devices (BREAST PUMP) MISC 1 each daily 1 each 0 Past Month at Unknown time     Prenatal Vit-Fe Fumarate-FA (PRENATAL MULTIVITAMIN PLUS IRON) 27-0.8 MG TABS per tablet Take 1 tablet by mouth daily   6/14/2020 at Unknown time             Discharge Medications:     Current Discharge Medication List      START taking these medications    Details   ibuprofen (ADVIL/MOTRIN) 200 MG tablet Take 3 tablets (600 mg) by mouth every 6 hours as needed for mild pain    Associated Diagnoses: Postpartum care and examination         CONTINUE these medications which have NOT CHANGED    Details   levothyroxine (SYNTHROID/LEVOTHROID) 50 MCG tablet Take 1 tablet (50 mcg) by mouth daily  Qty: 90 tablet, Refills: 4    Associated Diagnoses: Subclinical hypothyroidism      Misc. Devices (BREAST PUMP) MISC 1 each daily  Qty: 1 each, Refills: 0    Associated Diagnoses: Encounter for prenatal care in third trimester of first pregnancy      Prenatal  "Vit-Fe Fumarate-FA (PRENATAL MULTIVITAMIN PLUS IRON) 27-0.8 MG TABS per tablet Take 1 tablet by mouth daily                   Consultations:   No consultations were requested during this admission          Brief History of Labor:     Allie Ramos MRN# [id number]   Age: 29 year old YOB: 1990      ASSESSMENT & PLAN: Ms. Ramos is a 28 yo  who [facility] 36w5d by LMP c/w with 7w5d US with  mature rupture of membranes. Her pregnancy was complicated by subclinical hypothyroidism. I did start a course of betamethasone for fetal lung maturity. She was received a single dose of misoprostol for cervical ripening and then began to labor spontaneously. An epidural was placed for pain management. Please see notes below abut FHT. She progressed to complete dilation, pushed to a slow crown and delivered a vigorous male infant vertex, MARTIR via . APGARs 8 and 8. Weight pending due to skin-to-skin contact. The placenta delivered via gentle cord traction and was noted to be intact with a 3V cord. No signs of abruption. She had a 2nd degree laceration that was repaired with 3-0 vicryl. Mom and baby were stable for transport to postpartum.   \"Mil\"              Hospital Course:   The patient's hospital course was unremarkable.  On discharge, her pain was well controlled. Vaginal bleeding is similar to peak menstrual flow.  Voiding without difficulty.  Ambulating well and tolerating a normal diet.  No fever.  Breastfeeding well.  Infant is stable.  No bowel movement yet.*  She was discharged on post-partum day #2.    /76   Pulse 72   Temp 98.1  F (36.7  C) (Oral)   Resp 18   Ht 1.727 m (5' 8\")   Wt 68.1 kg (150 lb 1.6 oz)   LMP 10/02/2019   SpO2 98%   Breastfeeding Unknown   BMI 22.82 kg/m    Exam:  Constitutional: healthy, alert and no distress  Respiratory: negative, negative findings: normal respiratory rate and rhythm  Gastrointestinal: Abdomen soft, " non-tender. BS normal. No masses, organomegaly FF@U-2  : Deferred  Musculoskeletal: extremities normal- no gross deformities noted, gait normal and normal muscle tone  Skin: no suspicious lesions or rashes  Neurologic: Gait normal. Reflexes normal and symmetric. Sensation grossly WNL.  Psychiatric: mentation appears normal and affect normal/bright        Post-partum hemoglobin:   Hemoglobin   Date Value Ref Range Status   06/15/2020 12.2 11.7 - 15.7 g/dL Final             Discharge Instructions and Follow-Up:   Discharge diet: Regular   Discharge activity: Pelvic rest: abstain from intercourse and do not use tampons for 6 week(s)   Discharge follow-up: Follow up with   in 6 weeks   Wound care: Drink plenty of fluids  Ice to area for comfort           Discharge Disposition:   Discharged to Boarder status      Attestation:  I have reviewed today's vital signs, notes, medications, labs and imaging.  Amount of time performed on this discharge summary: 10 minutes.    Salvador Ramirez MD

## 2020-06-17 NOTE — PLAN OF CARE
Data: Vital signs within normal limits. Postpartum checks within normal limits - see flow record. Patient eating and drinking normally. Patient able to empty bladder independently. . Patient ambulating independently..   No apparent signs of infection. Lac 2nd degree healing well. Patient Is performing self cares and Is able to care for infant. Positive attachment behaviors are observed with infant. Support persons are present.  Action:  Pain plan was discussed. Patient would like pain meds to be brought in when they are due. Patient was medicated during the shift for  generalized soreness in perineum . See MAR.Patient education done about hand hygiene, breastfeeding,  cares, postpartum cares, pain management/plan,  safety, rest, and discharge from hospital. See flow record.  Response:   Patient reassessed within 1 hour after each medication for pain. Patient stated that pain had improved. Patient stated that she was comfortable. .   Plan: Anticipate discharge on 2020.

## 2020-06-18 NOTE — ANESTHESIA POSTPROCEDURE EVALUATION
Patient: Allie Ramos    * No procedures listed *    Diagnosis:* No pre-op diagnosis entered *  Diagnosis Additional Information: No value filed.    Anesthesia Type:  Epidural    Note:  Anesthesia Post Evaluation    Patient participation: Able to fully participate in evaluation  Level of consciousness: awake and alert  Pain management: adequate  Airway patency: patent  Cardiovascular status: acceptable and hemodynamically stable  Respiratory status: acceptable and room air  Hydration status: acceptable  PONV: none     Anesthetic complications: None          Last vitals:  There were no vitals filed for this visit.      Electronically Signed By: KARO Ornelas CRNA  June 18, 2020  6:42 AM

## 2020-07-09 ENCOUNTER — MEDICAL CORRESPONDENCE (OUTPATIENT)
Dept: HEALTH INFORMATION MANAGEMENT | Facility: CLINIC | Age: 30
End: 2020-07-09

## 2020-07-21 ENCOUNTER — PRENATAL OFFICE VISIT (OUTPATIENT)
Dept: OBGYN | Facility: CLINIC | Age: 30
End: 2020-07-21
Payer: COMMERCIAL

## 2020-07-21 VITALS
WEIGHT: 131.4 LBS | TEMPERATURE: 98.6 F | BODY MASS INDEX: 19.91 KG/M2 | DIASTOLIC BLOOD PRESSURE: 82 MMHG | SYSTOLIC BLOOD PRESSURE: 105 MMHG | HEART RATE: 110 BPM | RESPIRATION RATE: 14 BRPM | HEIGHT: 68 IN

## 2020-07-21 PROBLEM — Z34.00 SUPERVISION OF NORMAL FIRST PREGNANCY: Status: RESOLVED | Noted: 2020-06-15 | Resolved: 2020-07-21

## 2020-07-21 PROBLEM — Z34.00 PRENATAL CARE, FIRST PREGNANCY: Status: RESOLVED | Noted: 2018-06-18 | Resolved: 2020-07-21

## 2020-07-21 PROBLEM — O42.90 PROM (PREMATURE RUPTURE OF MEMBRANES): Status: RESOLVED | Noted: 2020-06-15 | Resolved: 2020-07-21

## 2020-07-21 PROCEDURE — 99207 ZZC POST PARTUM EXAM: CPT | Performed by: OBSTETRICS & GYNECOLOGY

## 2020-07-21 ASSESSMENT — ANXIETY QUESTIONNAIRES
1. FEELING NERVOUS, ANXIOUS, OR ON EDGE: NOT AT ALL
5. BEING SO RESTLESS THAT IT IS HARD TO SIT STILL: NOT AT ALL
IF YOU CHECKED OFF ANY PROBLEMS ON THIS QUESTIONNAIRE, HOW DIFFICULT HAVE THESE PROBLEMS MADE IT FOR YOU TO DO YOUR WORK, TAKE CARE OF THINGS AT HOME, OR GET ALONG WITH OTHER PEOPLE: NOT DIFFICULT AT ALL
3. WORRYING TOO MUCH ABOUT DIFFERENT THINGS: NOT AT ALL
7. FEELING AFRAID AS IF SOMETHING AWFUL MIGHT HAPPEN: NOT AT ALL
2. NOT BEING ABLE TO STOP OR CONTROL WORRYING: NOT AT ALL
6. BECOMING EASILY ANNOYED OR IRRITABLE: NOT AT ALL
GAD7 TOTAL SCORE: 0

## 2020-07-21 ASSESSMENT — PATIENT HEALTH QUESTIONNAIRE - PHQ9
SUM OF ALL RESPONSES TO PHQ QUESTIONS 1-9: 1
5. POOR APPETITE OR OVEREATING: NOT AT ALL

## 2020-07-21 ASSESSMENT — MIFFLIN-ST. JEOR: SCORE: 1374.53

## 2020-07-21 NOTE — PROGRESS NOTES
"Allie is here for a 6-week postpartum checkup.    She had a  of a liveborn baby boy, weight 6 pounds 1 oz.  The delivery was uncomplicated.  Since delivery, she has been breast feeding.  She has not had a normal menses.  She has not had intercourse.  Patient screened for postpartum depression and complaints are NEGATIVE. Screening has also been completed for intimate partner violence.    Her last pap was 2018 and was Normal    EXAM: /82 (BP Location: Right arm, Patient Position: Chair, Cuff Size: Adult Regular)   Pulse 110   Temp 98.6  F (37  C) (Tympanic)   Resp 14   Ht 1.727 m (5' 8\")   Wt 59.6 kg (131 lb 6.4 oz)   LMP 10/02/2019   Breastfeeding Yes   BMI 19.98 kg/m      HEENT: grossly normal.  NECK: no lymphadenopathy or thyromegaly.  ABDOMEN: soft, non tender, good bowel sounds, without masses, rebound, guarding or tenderness.  EXTREMITIES: Warm to touch, no ankle edema or calf tenderness.    PELVIC:    External genitalia: normal without lesion, perineum well healed   Vagina: normal mucosa and rugae, normal discharge.  Rectal: deferred, external hemorrhoids absent    A/P  Routine Postpartum    1. Contraception: condoms for now.  Reviewed other options while breastfeeding  2. Annual due     Kristin Smith M.D.   "

## 2020-07-21 NOTE — NURSING NOTE
"Initial /82 (BP Location: Right arm, Patient Position: Chair, Cuff Size: Adult Regular)   Pulse 110   Temp 98.6  F (37  C) (Tympanic)   Resp 14   Ht 1.727 m (5' 8\")   Wt 59.6 kg (131 lb 6.4 oz)   LMP 10/02/2019   Breastfeeding Yes   BMI 19.98 kg/m   Estimated body mass index is 19.98 kg/m  as calculated from the following:    Height as of this encounter: 1.727 m (5' 8\").    Weight as of this encounter: 59.6 kg (131 lb 6.4 oz). .    Linda Carias, MYRIAM    "

## 2020-07-22 ASSESSMENT — ANXIETY QUESTIONNAIRES: GAD7 TOTAL SCORE: 0

## 2020-08-17 ENCOUNTER — MEDICAL CORRESPONDENCE (OUTPATIENT)
Dept: HEALTH INFORMATION MANAGEMENT | Facility: CLINIC | Age: 30
End: 2020-08-17

## 2020-10-19 ENCOUNTER — MEDICAL CORRESPONDENCE (OUTPATIENT)
Dept: HEALTH INFORMATION MANAGEMENT | Facility: CLINIC | Age: 30
End: 2020-10-19

## 2020-10-31 ENCOUNTER — IMMUNIZATION (OUTPATIENT)
Dept: FAMILY MEDICINE | Facility: CLINIC | Age: 30
End: 2020-10-31
Payer: COMMERCIAL

## 2020-10-31 PROCEDURE — 90686 IIV4 VACC NO PRSV 0.5 ML IM: CPT

## 2020-10-31 PROCEDURE — 90471 IMMUNIZATION ADMIN: CPT

## 2020-12-07 ENCOUNTER — OFFICE VISIT (OUTPATIENT)
Dept: FAMILY MEDICINE | Facility: CLINIC | Age: 30
End: 2020-12-07
Payer: COMMERCIAL

## 2020-12-07 VITALS
OXYGEN SATURATION: 98 % | WEIGHT: 132 LBS | HEART RATE: 110 BPM | HEIGHT: 68 IN | BODY MASS INDEX: 20 KG/M2 | SYSTOLIC BLOOD PRESSURE: 108 MMHG | DIASTOLIC BLOOD PRESSURE: 88 MMHG | TEMPERATURE: 98.1 F

## 2020-12-07 DIAGNOSIS — S83.422A SPRAIN OF LATERAL COLLATERAL LIGAMENT OF LEFT KNEE, INITIAL ENCOUNTER: Primary | ICD-10-CM

## 2020-12-07 DIAGNOSIS — M26.609 TMJ (TEMPOROMANDIBULAR JOINT SYNDROME): ICD-10-CM

## 2020-12-07 PROCEDURE — 99214 OFFICE O/P EST MOD 30 MIN: CPT | Performed by: NURSE PRACTITIONER

## 2020-12-07 ASSESSMENT — MIFFLIN-ST. JEOR: SCORE: 1377.25

## 2020-12-07 NOTE — PATIENT INSTRUCTIONS
Thank you for choosing [facility] (formerly [facility]).  You may be receiving an email and/or telephone survey request from [facility] Experience regarding your visit today.  Please take a few minutes to respond to the survey to let us know how we are doing.      If you have questions or concerns, please contact us via [employer] or you can contact your care team at [phone].    Our Clinic hours are:  Monday 6:40 am  to 7:00 pm  Tuesday -Friday 6:40 am to 5:00 pm    The [place] outpatient lab hours are:  Monday - Friday 6:10 am to 4:45 pm  Saturdays 7:00 am to 11:00 am  Appointments are required, call [phone]    If you have clinical questions after hours or would like to schedule an appointment,  call the clinic at [phone].    Patient Education     Helping Your Temporomandibular Joint (TMJ) Heal    The temporomandibular joint (TMJ) is a ball-and-socket joint located where the upper and lower jaws meet. When the TMJ and related muscles are injured, they need time to heal. Self-care is very important. You can take steps to reduce pressure on the TMJ and speed healing.  Eating with care  Chewing strains the TMJ. When symptoms are bad, you may not be able to chew at all. To get you through times when your symptoms are at their worst, try these tips:    Choose soft foods. These include scrambled eggs, oatmeal, yogurt, quiche, tofu, soup, smoothies, pasta, fish, mashed potatoes, milkshakes, bananas, applesauce, gelatin, or ice cream.    Don t bite into hard foods. These include whole apples, carrots, and corn on the cob. Instead cut foods into bite-sized pieces.    Grind or finely chop meats and other tough foods. Try hamburger meat instead of steak.  Using ice and heat  Your healthcare provider may suggest using ice and heat. Ice helps reduce swelling and pain. Heat helps relax muscles, increasing blood flow.    Use a gel pack or cold pack for severe pain. Apply for 10 to 20 minutes. Repeat as needed. To make  a cold pack, put ice cubes in a plastic bag that seals at the top. Wrap the bag in a clean, thin towel or cloth. Never put ice or a cold pack directly on the skin.    Use moist heat for mild to moderate muscle pain. Apply a moist, warm towel to the muscles for 10 to 20 minutes. Repeat as needed.  Staying away from triggers  Certain activities (called triggers) strain the TMJ, making symptoms worse. The tips below can help you avoid common triggers and limit strain.    Don t eat hard or chewy foods. These include nuts, pretzels, popcorn, chips, gum, caramel, gummy candies, carrots, whole apples, hard breads, and even ice.    Reschedule routine dental visits, like cleanings, if your jaw aches. If you have severe pain, call your healthcare provider.    Support your jaw when yawning. When you feel a yawn coming on, put a fist under your jaw. Apply gentle pressure. This helps prevent wide, painful yawns.    Don t do any activity that hurts. This includes nail biting, yelling, and singing.  Maintaining good posture  Work at improving your posture during the day and when you sleep. Good posture can help your body heal. Try these tips:    Use a headset when on the phone. Don t cradle the phone with your shoulder.    Keep ergonomics in mind. This includes making sure your workstation fits your body. Support your lower back. Take breaks often to stretch and rest. If you use a computer, keep the monitor at eye level.    Keep your head in a neutral position.  Keep your ears in line with your shoulders. Don t slouch or crane your head forward.    Use an orthopedic pillow. Use this to support your head and neck during sleep.  [employer] last reviewed this educational content on 8/1/2017 2000-2020 [employer]. [address] [place] [address], [place] [address]. All rights reserved. This information is not intended as a substitute for professional medical care. Always follow your healthcare professional's instructions.            Patient Education     Self-Care for Temporomandibular Disorders (TMD)  You have temporomandibular disorder (TMD). This term describes a group of problems related to the temporomandibular joint (TMJ) and nearby muscles. The TMJ is located where the upper and lower jaws meet. Treatment will get your jaw back to normal function. But your care doesn t end there. Once you ve had TMD, it s important to avoid reinjury. Get in the habit of doing self-checks. This can make you aware of any symptoms that begin to return, so you can take action right away.    Doing self-checks  Make it a habit to assess your body a few times each day. Try writing yourself a reminder. Or set an alarm on your watch or computer. When doing a self-check, ask yourself:    Do I feel stressed?    Are my muscles tense?    Am I grinding or clenching my teeth?    Is my posture healthy for my body?    Is there anything I can do to make myself more comfortable?  If you answer yes to any of the questions above, you need to take action. Changing your posture or taking a short break can help prevent or relieve TMD symptoms.  Listening to your body  Many people get used to ignoring pain. But pain is a signal that your body needs care. To maintain your TMJ health:    Don t eat hard or chewy foods. Even if you feel fine, eating such foods can trigger symptoms again.    Be aware of your body. Don t ignore TMD symptoms. The nagging pain in your neck or jaw may be a sign that you need care.    Keep follow-up appointments. Be sure to keep all appointments with your healthcare team.                                                                                                                                Managing stress  Stress is a key factor in TMD. Stress can make you clench your muscles or grind your teeth. It can also affect your sleep, reducing your body s ability to heal. Here are a few tips to manage stress:    Learn ways to relax. Try listening to  music or gently stretching. Take a few slow deep breaths. Or, close your eyes and imagine a place or object that is calming.    Get plenty of rest and sleep.    Set goals you know you can attain.    Make time for people and things you enjoy.    Ask for help if you need it. Friends and family can run errands and cook meals for you.   Staying active  Activity helps the body in many ways. You stay looser and more relaxed. It also helps keep muscles and tissues conditioned. That way you can heal faster and make reinjury less likely. Here are some tips to get you started:    Talk with your healthcare provider before starting an exercise program.    Always warm up and stretch before each activity. This helps prevent injury.    Try walking or swimming. These activities are easy on your joints. They also benefit your heart and lungs.    Try yoga or carrie chi. These are relaxing activities known for reducing stress.  [employer] last reviewed this educational content on 8/1/2017 2000-2020 [employer]. [address] [place] [address]. All rights reserved. This information is not intended as a substitute for professional medical care. Always follow your healthcare professional's instructions.           Patient Education     Knee Sprain of the Collateral Ligaments  The knee is a hinge joint supported by four strong ligaments. The two ligaments inside the knee protect this joint from excess forward and backward movement. The ligaments on the outside of the joint prevent side-to-side motion. These are called the collateral ligaments.  The medial collateral ligament is located on the inner side of the joint; and the lateral collateral ligament is on the outer side of the joint.  A sprain is a tearing of a ligament. The tear may be partial or complete. Diagnosis is made by physical exam. In the case of an acute injury, the knee may be too swollen or painful to examine fully. A more accurate exam can be done  after the initial swelling goes down.  Symptoms of a knee sprain include immediate knee swelling, pain, and difficulty walking. Initial treatment includes rest, splinting the knee to reduce movement of the joint, and icing the knee to reduce swelling and pain. You may use over-the-counter pain medicine to control pain, unless another medicine was prescribed. Most sprains will heal in 3 to 6 weeks. A severe injury can take 3 to 4 months to heal. You may also need rehab exercises. Surgery is usually not required for sprains involving only the collateral ligaments.  Home care    Stay off the injured leg as much as possible until you can walk on it without pain. If you have a lot of pain while walking, crutches or a walker may be prescribed. These can be rented or purchased at many pharmacies and surgical or orthopedic supply stores. Follow your healthcare provider s advice about when to begin bearing weight on that leg.     If you were given a hook-and-loop closure knee brace, you can remove it to bathe. But leave it in place when walking, sitting, or lying down unless told otherwise.    Apply an ice pack over the injured area for 15 to 20 minutes every 3 to 6 hours. You should do this for the first 24 to 48 hours. You can make an ice pack by filling a plastic bag that seals at the top with ice cubes and then wrapping it with a thin towel. Continue to use ice packs for relief of pain and swelling as needed. As the ice melts, be careful to avoid getting your wrap, splint, or cast wet. You can place the ice pack directly over the splint. If you have to wear a hook-and-loop knee brace, you can open it to apply the ice pack, or heat, directly to the knee. Never put ice directly on the skin. Always wrap the ice in a towel or other type of cloth.    You may use over-the-counter pain medicine to control pain, unless another pain medicine was prescribed. Anti-inflammatory pain medicines, such as ibuprofen or naproxen may be  more effective than acetaminophen. If you have chronic liver or kidney disease or ever had a stomach ulcer or gastrointestinal bleeding, talk with your healthcare provider before using these medicines.  Follow-up care  Follow up with your healthcare provider as advised. Any X-rays you had today don t show any broken bones, breaks, or fractures. Sometimes fractures don t show up on the first X-ray. Bruises and sprains can sometimes hurt as much as a fracture. These injuries can take time to heal completely. If your symptoms don t improve or they get worse, talk with your healthcare provider. You may need a repeat X-ray or other tests such as MRI. If X-rays were taken, you will be told of any new findings that may affect your care.  Call 911  Call 911 if you have:     Shortness of breath     Chest pain  When to seek medical advice  Call your healthcare provider right away if any of these occur:    Pain or swelling increases    Swelling, redness, or pain in the calf or thigh  [employer] last reviewed this educational content on 5/1/2018 2000-2020 [employer]. [address] [place] [address]. All rights reserved. This information is not intended as a substitute for professional medical care. Always follow your healthcare professional's instructions.

## 2020-12-07 NOTE — PROGRESS NOTES
"Subjective     Allie Ramos is a 29 year old female who presents to clinic today for the following health issues:    HPI         Musculoskeletal problem/pain  Onset/Duration: 3 weeks  Possible cause is new exercise routine - using [employer] program, lots of squats and lunges.   Description  Location: knee - left;  Lateral knee and behind knee cap  Joint Swelling: no  Redness: no  Pain: YES  Warmth: no  Intensity:  Mild, to  moderate  Progression of Symptoms:  same  Accompanying signs and symptoms:   Fevers: no  Numbness/tingling/weakness: no  History  Trauma to the area: no  Recent illness:  no  Previous similar problem: no  Previous evaluation:  no  Precipitating or alleviating factors:  Aggravating factors include: climbing stairs and change of position from sitting to standing  Therapies tried and outcome: rest- stopped exercising for a while. Ice, ibuprofen      Concern - jaw pain  Onset: 6 months  Description: patient reports jaw pain- left side  Feels like it gets stuck with open and closing - clicking.  Intensity: moderate  Progression of Symptoms:  same  Accompanying Signs & Symptoms:   Previous history of similar problem: no  Precipitating factors:        Worsened by: unknown  Alleviating factors:        Improved by: nothing  Therapies tried and outcome: spoke with her dentist-  No answers other than he told her she doesn't grind her teeth.        Review of Systems   Constitutional, HEENT, cardiovascular, pulmonary, gi and gu systems are negative, except as otherwise noted.      Objective    /88 (BP Location: Right arm)   Pulse 110   Temp 98.1  F (36.7  C)   Ht 1.727 m (5' 8\")   Wt 59.9 kg (132 lb)   SpO2 98%   Breastfeeding Yes   BMI 20.07 kg/m    Body mass index is 20.07 kg/m .  Physical Exam   GENERAL: healthy, alert and no distress  HENT: Jaw - appearance normal. No tenderness to palpation of the TMJ bilaterally or any of the jaw line muscles. Clicking and popping noted when she opens " her jaw.  MS: knee exam normal knee exam, no swelling, tenderness, effusion or instability; ligaments intact, full ROM.                Assessment & Plan     Sprain of lateral collateral ligament of left knee, initial encounter  Discussed strengthening, and stretching - handout given.  Avoid exercise routines that cause pain.  Ice at least twice daily.   NSAID (ibuprofen 600 mg every 6 hours or aleve 2 tabs twice daily) for pain and inflammation.  Return for Follow up if not improving in 2 weeks.   Consider Physical therapy at that time if needed.      TMJ (temporomandibular joint syndrome)  See AVS for advice.          Patient Instructions         Thank you for choosing [facility].  You may be receiving an email and/or telephone survey request from [facility] regarding your visit today.  Please take a few minutes to respond to the survey to let us know how we are doing.      If you have questions or concerns, please contact us via [employer] or you can contact your care team at [phone].    Our Clinic hours are:  Monday 6:40 am  to 7:00 pm  Tuesday -Friday 6:40 am to 5:00 pm    The [place] outpatient lab hours are:  Monday - Friday 6:10 am to 4:45 pm  Saturdays 7:00 am to 11:00 am  Appointments are required, call [phone]    If you have clinical questions after hours or would like to schedule an appointment,  call the clinic at [phone].    Patient Education     Helping Your Temporomandibular Joint (TMJ) Heal    The temporomandibular joint (TMJ) is a ball-and-socket joint located where the upper and lower jaws meet. When the TMJ and related muscles are injured, they need time to heal. Self-care is very important. You can take steps to reduce pressure on the TMJ and speed healing.  Eating with care  Chewing strains the TMJ. When symptoms are bad, you may not be able to chew at all. To get you through times when your symptoms are at their worst, try these tips:    Choose soft foods.  These include scrambled eggs, oatmeal, yogurt, quiche, tofu, soup, smoothies, pasta, fish, mashed potatoes, milkshakes, bananas, applesauce, gelatin, or ice cream.    Don t bite into hard foods. These include whole apples, carrots, and corn on the cob. Instead cut foods into bite-sized pieces.    Grind or finely chop meats and other tough foods. Try hamburger meat instead of steak.  Using ice and heat  Your healthcare provider may suggest using ice and heat. Ice helps reduce swelling and pain. Heat helps relax muscles, increasing blood flow.    Use a gel pack or cold pack for severe pain. Apply for 10 to 20 minutes. Repeat as needed. To make a cold pack, put ice cubes in a plastic bag that seals at the top. Wrap the bag in a clean, thin towel or cloth. Never put ice or a cold pack directly on the skin.    Use moist heat for mild to moderate muscle pain. Apply a moist, warm towel to the muscles for 10 to 20 minutes. Repeat as needed.  Staying away from triggers  Certain activities (called triggers) strain the TMJ, making symptoms worse. The tips below can help you avoid common triggers and limit strain.    Don t eat hard or chewy foods. These include nuts, pretzels, popcorn, chips, gum, caramel, gummy candies, carrots, whole apples, hard breads, and even ice.    Reschedule routine dental visits, like cleanings, if your jaw aches. If you have severe pain, call your healthcare provider.    Support your jaw when yawning. When you feel a yawn coming on, put a fist under your jaw. Apply gentle pressure. This helps prevent wide, painful yawns.    Don t do any activity that hurts. This includes nail biting, yelling, and singing.  Maintaining good posture  Work at improving your posture during the day and when you sleep. Good posture can help your body heal. Try these tips:    Use a headset when on the phone. Don t cradle the phone with your shoulder.    Keep ergonomics in mind. This includes making sure your workstation  fits your body. Support your lower back. Take breaks often to stretch and rest. If you use a computer, keep the monitor at eye level.    Keep your head in a neutral position.  Keep your ears in line with your shoulders. Don t slouch or crane your head forward.    Use an orthopedic pillow. Use this to support your head and neck during sleep.  [employer] last reviewed this educational content on 8/1/2017 2000-2020 [employer]. [address] [place] [address], [place] [address]. All rights reserved. This information is not intended as a substitute for professional medical care. Always follow your healthcare professional's instructions.           Patient Education     Self-Care for Temporomandibular Disorders (TMD)  You have temporomandibular disorder (TMD). This term describes a group of problems related to the temporomandibular joint (TMJ) and nearby muscles. The TMJ is located where the upper and lower jaws meet. Treatment will get your jaw back to normal function. But your care doesn t end there. Once you ve had TMD, it s important to avoid reinjury. Get in the habit of doing self-checks. This can make you aware of any symptoms that begin to return, so you can take action right away.    Doing self-checks  Make it a habit to assess your body a few times each day. Try writing yourself a reminder. Or set an alarm on your watch or computer. When doing a self-check, ask yourself:    Do I feel stressed?    Are my muscles tense?    Am I grinding or clenching my teeth?    Is my posture healthy for my body?    Is there anything I can do to make myself more comfortable?  If you answer yes to any of the questions above, you need to take action. Changing your posture or taking a short break can help prevent or relieve TMD symptoms.  Listening to your body  Many people get used to ignoring pain. But pain is a signal that your body needs care. To maintain your TMJ health:    Don t eat hard or chewy foods. Even if you feel  fine, eating such foods can trigger symptoms again.    Be aware of your body. Don t ignore TMD symptoms. The nagging pain in your neck or jaw may be a sign that you need care.    Keep follow-up appointments. Be sure to keep all appointments with your healthcare team.                                                                                                                                Managing stress  Stress is a key factor in TMD. Stress can make you clench your muscles or grind your teeth. It can also affect your sleep, reducing your body s ability to heal. Here are a few tips to manage stress:    Learn ways to relax. Try listening to music or gently stretching. Take a few slow deep breaths. Or, close your eyes and imagine a place or object that is calming.    Get plenty of rest and sleep.    Set goals you know you can attain.    Make time for people and things you enjoy.    Ask for help if you need it. Friends and family can run errands and cook meals for you.   Staying active  Activity helps the body in many ways. You stay looser and more relaxed. It also helps keep muscles and tissues conditioned. That way you can heal faster and make reinjury less likely. Here are some tips to get you started:    Talk with your healthcare provider before starting an exercise program.    Always warm up and stretch before each activity. This helps prevent injury.    Try walking or swimming. These activities are easy on your joints. They also benefit your heart and lungs.    Try yoga or carrie chi. These are relaxing activities known for reducing stress.  [employer] last reviewed this educational content on 8/1/2017 2000-2020 [employer]. [address] [place] [address], [place] [address]. All rights reserved. This information is not intended as a substitute for professional medical care. Always follow your healthcare professional's instructions.           Patient Education     Knee Sprain of the Collateral  Ligaments  The knee is a hinge joint supported by four strong ligaments. The two ligaments inside the knee protect this joint from excess forward and backward movement. The ligaments on the outside of the joint prevent side-to-side motion. These are called the collateral ligaments.  The medial collateral ligament is located on the inner side of the joint; and the lateral collateral ligament is on the outer side of the joint.  A sprain is a tearing of a ligament. The tear may be partial or complete. Diagnosis is made by physical exam. In the case of an acute injury, the knee may be too swollen or painful to examine fully. A more accurate exam can be done after the initial swelling goes down.  Symptoms of a knee sprain include immediate knee swelling, pain, and difficulty walking. Initial treatment includes rest, splinting the knee to reduce movement of the joint, and icing the knee to reduce swelling and pain. You may use over-the-counter pain medicine to control pain, unless another medicine was prescribed. Most sprains will heal in 3 to 6 weeks. A severe injury can take 3 to 4 months to heal. You may also need rehab exercises. Surgery is usually not required for sprains involving only the collateral ligaments.  Home care    Stay off the injured leg as much as possible until you can walk on it without pain. If you have a lot of pain while walking, crutches or a walker may be prescribed. These can be rented or purchased at many pharmacies and surgical or orthopedic supply stores. Follow your healthcare provider s advice about when to begin bearing weight on that leg.     If you were given a hook-and-loop closure knee brace, you can remove it to bathe. But leave it in place when walking, sitting, or lying down unless told otherwise.    Apply an ice pack over the injured area for 15 to 20 minutes every 3 to 6 hours. You should do this for the first 24 to 48 hours. You can make an ice pack by filling a plastic bag that  seals at the top with ice cubes and then wrapping it with a thin towel. Continue to use ice packs for relief of pain and swelling as needed. As the ice melts, be careful to avoid getting your wrap, splint, or cast wet. You can place the ice pack directly over the splint. If you have to wear a hook-and-loop knee brace, you can open it to apply the ice pack, or heat, directly to the knee. Never put ice directly on the skin. Always wrap the ice in a towel or other type of cloth.    You may use over-the-counter pain medicine to control pain, unless another pain medicine was prescribed. Anti-inflammatory pain medicines, such as ibuprofen or naproxen may be more effective than acetaminophen. If you have chronic liver or kidney disease or ever had a stomach ulcer or gastrointestinal bleeding, talk with your healthcare provider before using these medicines.  Follow-up care  Follow up with your healthcare provider as advised. Any X-rays you had today don t show any broken bones, breaks, or fractures. Sometimes fractures don t show up on the first X-ray. Bruises and sprains can sometimes hurt as much as a fracture. These injuries can take time to heal completely. If your symptoms don t improve or they get worse, talk with your healthcare provider. You may need a repeat X-ray or other tests such as MRI. If X-rays were taken, you will be told of any new findings that may affect your care.  Call 911  Call 911 if you have:     Shortness of breath     Chest pain  When to seek medical advice  Call your healthcare provider right away if any of these occur:    Pain or swelling increases    Swelling, redness, or pain in the calf or thigh  [employer] last reviewed this educational content on 5/1/2018 2000-2020 [employer]. [address] [place] [address], [place] [address]. All rights reserved. This information is not intended as a substitute for professional medical care. Always follow your healthcare professional's  instructions.               Return in about 4 weeks (around 1/4/2021), or if symptoms worsen or fail to improve.    The risks, benefits and treatment options of prescribed medications or other treatments have been discussed with the patient. The patient verbalized their understanding and should call or follow up if no improvement or if they develop further problems.    KARO Bermudez [facility]

## 2021-03-27 ENCOUNTER — MYC MEDICAL ADVICE (OUTPATIENT)
Dept: OBGYN | Facility: CLINIC | Age: 31
End: 2021-03-27

## 2021-03-27 DIAGNOSIS — E03.8 SUBCLINICAL HYPOTHYROIDISM: ICD-10-CM

## 2021-03-29 RX ORDER — LEVOTHYROXINE SODIUM 50 UG/1
50 TABLET ORAL DAILY
Qty: 90 TABLET | Refills: 4 | Status: SHIPPED | OUTPATIENT
Start: 2021-03-29 | End: 2022-04-08

## 2021-06-05 NOTE — PROGRESS NOTES
MFM received a FTS referral from [facility].  Calls made to patient, messages were left, and no return call from patient.  Patient is now outside of the FTS scheduling window.  Removing orders.  Referring clinic notified.      Laura Ansari

## 2021-06-09 ENCOUNTER — OFFICE VISIT (OUTPATIENT)
Dept: OBGYN | Facility: CLINIC | Age: 31
End: 2021-06-09
Payer: COMMERCIAL

## 2021-06-09 VITALS
TEMPERATURE: 97.8 F | BODY MASS INDEX: 19.37 KG/M2 | WEIGHT: 123.4 LBS | SYSTOLIC BLOOD PRESSURE: 132 MMHG | DIASTOLIC BLOOD PRESSURE: 96 MMHG | HEIGHT: 67 IN | HEART RATE: 86 BPM | RESPIRATION RATE: 14 BRPM

## 2021-06-09 DIAGNOSIS — Z00.00 ROUTINE GENERAL MEDICAL EXAMINATION AT A HEALTH CARE FACILITY: Primary | ICD-10-CM

## 2021-06-09 LAB — TSH SERPL DL<=0.005 MIU/L-ACNC: 1.09 MU/L (ref 0.4–4)

## 2021-06-09 PROCEDURE — 99395 PREV VISIT EST AGE 18-39: CPT | Performed by: OBSTETRICS & GYNECOLOGY

## 2021-06-09 PROCEDURE — 36415 COLL VENOUS BLD VENIPUNCTURE: CPT | Performed by: OBSTETRICS & GYNECOLOGY

## 2021-06-09 PROCEDURE — 84443 ASSAY THYROID STIM HORMONE: CPT | Performed by: OBSTETRICS & GYNECOLOGY

## 2021-06-09 ASSESSMENT — MIFFLIN-ST. JEOR: SCORE: 1324.51

## 2021-06-09 NOTE — NURSING NOTE
"Initial BP (!) 132/96 (BP Location: Right arm, Patient Position: Chair, Cuff Size: Adult Small)   Pulse 86   Temp 97.8  F (36.6  C) (Tympanic)   Resp 14   Ht 1.713 m (5' 7.45\")   Wt 56 kg (123 lb 6.4 oz)   LMP 05/21/2021   Breastfeeding No   BMI 19.07 kg/m   Estimated body mass index is 19.07 kg/m  as calculated from the following:    Height as of this encounter: 1.713 m (5' 7.45\").    Weight as of this encounter: 56 kg (123 lb 6.4 oz). .      "

## 2021-06-09 NOTE — PROGRESS NOTES
SUBJECTIVE:   CC: Allie Ramos is an 30 year old woman who presents for preventive health visit.     Planning to start trying for pregnancy again now.     Patient has been advised of split billing requirements and indicates understanding: Yes  Healthy Habits:    Do you get at least three servings of calcium containing foods daily (dairy, green leafy vegetables, etc.)? yes    Amount of exercise or daily activities, outside of work: 5 day(s) per week    Problems taking medications regularly No    Medication side effects: No    Have you had an eye exam in the past two years? no    Do you see a dentist twice per year? yes    Do you have sleep apnea, excessive snoring or daytime drowsiness?no      Today's PHQ-2 Score:   PHQ-2 ( 1999 [employer]) 6/9/2021 7/21/2020   Q1: Little interest or pleasure in doing things 0 0   Q2: Feeling down, depressed or hopeless 0 0   PHQ-2 Score 0 0   Q1: Little interest or pleasure in doing things - -   Q2: Feeling down, depressed or hopeless - -   PHQ-2 Score - -       Abuse: Current or Past(Physical, Sexual or Emotional)- No  Do you feel safe in your environment? Yes      Social History     Tobacco Use     Smoking status: Never Smoker     Smokeless tobacco: Never Used   Substance Use Topics     Alcohol use: Not Currently     Comment: occas-quit with pregnancy     If you drink alcohol do you typically have >3 drinks per day or >7 drinks per week? No                     Reviewed orders with patient.  Reviewed health maintenance and updated orders accordingly - Yes  Lab work is in process    FSH-7: No flowsheet data found.  click delete button to remove this line now  Patient under 40 years of age: Routine Mammogram Screening not recommended.   Pertinent mammograms are reviewed under the imaging tab.    Pertinent mammograms are reviewed under the imaging tab.  History of abnormal Pap smear: NO - age 21-29 PAP every 3 years recommended  PAP / HPV 12/14/2018   PAP NIL     Reviewed and updated  "as needed this visit by clinical staff  Tobacco  Allergies  Meds   Med Hx  Surg Hx  Fam Hx  Soc Hx        Reviewed and updated as needed this visit by Provider                Past Medical History:   Diagnosis Date     Chickenpox      Febrile seizure (H)     x1      Past Surgical History:   Procedure Laterality Date     EYE SURGERY      left eye     MOUTH SURGERY      wisdom teeth     TONSILLECTOMY & ADENOIDECTOMY      age 8     OB History    Para Term  AB Living   2 1 0 1 1 1   SAB TAB Ectopic Multiple Live Births   0 0 0 0 1      # Outcome Date GA Lbr Dylan/2nd Weight Sex Delivery Anes PTL Lv   2  06/15/20 36w5d 15:19 / 01:06 2.76 kg (6 lb 1.4 oz) M Vag-Spont EPI Y TERRY      Complications: Fetal Intolerance      Name: Mil      Apgar1: 8  Apgar5: 8   1 AB 18 5w1d    SAB          ROS:  CONSTITUTIONAL: NEGATIVE for fever, chills, change in weight  INTEGUMENTARU/SKIN: NEGATIVE for worrisome rashes, moles or lesions  EYES: NEGATIVE for vision changes or irritation  ENT: NEGATIVE for ear, mouth and throat problems  RESP: NEGATIVE for significant cough or SOB  BREAST: NEGATIVE for masses, tenderness or discharge  CV: NEGATIVE for chest pain, palpitations or peripheral edema  GI: NEGATIVE for nausea, abdominal pain, heartburn, or change in bowel habits  : NEGATIVE for unusual urinary or vaginal symptoms. Periods are regular.  MUSCULOSKELETAL: NEGATIVE for significant arthralgias or myalgia  NEURO: NEGATIVE for weakness, dizziness or paresthesias  PSYCHIATRIC: NEGATIVE for changes in mood or affect    OBJECTIVE:   BP (!) 132/96 (BP Location: Right arm, Patient Position: Chair, Cuff Size: Adult Small)   Pulse 86   Temp 97.8  F (36.6  C) (Tympanic)   Resp 14   Ht 1.713 m (5' 7.45\")   Wt 56 kg (123 lb 6.4 oz)   LMP 2021   Breastfeeding No   BMI 19.07 kg/m    EXAM:  GENERAL: healthy, alert and no distress  RESP: breathing comfortably on room air with no appreciable cough or " "wheeze  BREAST: normal without masses, tenderness or nipple discharge and no palpable axillary masses or adenopathy  CV:  Reg rate, warm and well-perfused  ABDOMEN: soft, nontender, no hepatosplenomegaly, no masses and bowel sounds normal   (female): normal female external genitalia, normal urethral meatus, vaginal mucosa pink, moist, well rugated, and normal cervix/adnexa/uterus without masses or discharge  MS: no gross musculoskeletal defects noted, no edema  SKIN: no suspicious lesions or rashes  NEURO: Normal strength and tone, mentation intact and speech normal  PSYCH: mentation appears normal, affect normal/bright    Diagnostic Test Results:  Labs reviewed in Epic    ASSESSMENT/PLAN:       ICD-10-CM    1. Routine general medical examination at a health care facility  Z00.00 TSH with free T4 reflex       Patient has been advised of split billing requirements and indicates understanding: Yes  COUNSELING:   Reviewed preventive health counseling, as reflected in patient instructions  Special attention given to:        breast awareness, start PNVs    Estimated body mass index is 19.07 kg/m  as calculated from the following:    Height as of this encounter: 1.713 m (5' 7.45\").    Weight as of this encounter: 56 kg (123 lb 6.4 oz).      She reports that she has never smoked. She has never used smokeless tobacco.      Counseling Resources:  ATP IV Guidelines  Pooled Cohorts Equation Calculator  Breast Cancer Risk Calculator  BRCA-Related Cancer Risk Assessment: FHS-7 Tool  FRAX Risk Assessment  ICSI Preventive Guidelines  Dietary Guidelines for Americans, 2010  USDA's MyPlate  ASA Prophylaxis  Lung CA Screening    Kaylee Orozco MD  [facility] [place]  "

## 2021-09-14 ENCOUNTER — TELEPHONE (OUTPATIENT)
Dept: OBGYN | Facility: CLINIC | Age: 31
End: 2021-09-14

## 2021-09-14 NOTE — TELEPHONE ENCOUNTER
Reason for Call:  Other appointment    Detailed comments: Pt needs 1st prenatal appt, she is about 7 wks, LMP was 7/25-7/29. Took two home positive pregnancy tests on 8/24 and 8/25.     Phone Number Patient can be reached at: Home number on file [phone] (home)    Best Time: anytime    Can we leave a detailed message on this number? YES    Call taken on 9/14/2021 at 8:50 AM by Xochilt Croft

## 2021-09-16 ENCOUNTER — APPOINTMENT (OUTPATIENT)
Dept: OBGYN | Facility: CLINIC | Age: 31
End: 2021-09-16
Payer: COMMERCIAL

## 2021-09-16 ENCOUNTER — PRENATAL OFFICE VISIT (OUTPATIENT)
Dept: OBGYN | Facility: CLINIC | Age: 31
End: 2021-09-16

## 2021-09-16 DIAGNOSIS — Z34.80 PRENATAL CARE, SUBSEQUENT PREGNANCY: ICD-10-CM

## 2021-09-16 PROCEDURE — 99207 PR NO CHARGE NURSE ONLY: CPT | Performed by: OBSTETRICS & GYNECOLOGY

## 2021-09-16 RX ORDER — PRENATAL VIT/IRON FUM/FOLIC AC 27MG-0.8MG
1 TABLET ORAL DAILY
COMMUNITY
Start: 2021-08-16 | End: 2023-05-12

## 2021-09-16 NOTE — PROGRESS NOTES
Denied need for review of teaching  [facility] OB Intake Nurse    Patient supplied answers from flow sheet for:  Prenatal OB Questionnaire.  Past Medical History  Have you ever recieved care for your mental health? : No  Have you ever been in a major accident or suffered serious trauma?: No  Within the last year, has anyone hit, slapped, kicked or otherwise hurt you?: No  In the last year, has anyone forced you to have sex when you didn't want to?: No    Past Medical History 2   Have you ever received a blood transfusion?: No  Would you accept a blood transfusion if was medically recommended?: Yes  Does anyone in your home smoke?: No   Is your blood type Rh negative?: No  Have you ever ?: (!) Yes  Have you been hospitalized for a nonsurgical reason excluding normal delivery?: (!) Yes (dehydration as a toddler)  Have you ever had an abnormal pap smear?: No    Past Medical History (Continued)  Do you have a history of abnormalities of the uterus?: No  Did your mother take ÁNGEL or any other hormones when she was pregnant with you?: No  Do you have any other problems we have not asked about which you feel may be important to this pregnancy?: No

## 2021-10-01 ENCOUNTER — PRENATAL OFFICE VISIT (OUTPATIENT)
Dept: OBGYN | Facility: CLINIC | Age: 31
End: 2021-10-01
Payer: COMMERCIAL

## 2021-10-01 ENCOUNTER — APPOINTMENT (OUTPATIENT)
Dept: OBGYN | Facility: CLINIC | Age: 31
End: 2021-10-01
Payer: COMMERCIAL

## 2021-10-01 VITALS
DIASTOLIC BLOOD PRESSURE: 81 MMHG | BODY MASS INDEX: 18.54 KG/M2 | SYSTOLIC BLOOD PRESSURE: 121 MMHG | WEIGHT: 120 LBS | HEART RATE: 94 BPM | TEMPERATURE: 97.7 F

## 2021-10-01 DIAGNOSIS — Z34.81 PRENATAL CARE, SUBSEQUENT PREGNANCY IN FIRST TRIMESTER: Primary | ICD-10-CM

## 2021-10-01 DIAGNOSIS — Z23 NEED FOR PROPHYLACTIC VACCINATION AND INOCULATION AGAINST INFLUENZA: ICD-10-CM

## 2021-10-01 LAB
ABO/RH(D): NORMAL
ALBUMIN UR-MCNC: NEGATIVE MG/DL
ANTIBODY SCREEN: NEGATIVE
APPEARANCE UR: CLEAR
BILIRUB UR QL STRIP: NEGATIVE
COLOR UR AUTO: YELLOW
ERYTHROCYTE [DISTWIDTH] IN BLOOD BY AUTOMATED COUNT: 11.4 % (ref 10–15)
GLUCOSE UR STRIP-MCNC: NEGATIVE MG/DL
HCT VFR BLD AUTO: 39.3 % (ref 35–47)
HGB BLD-MCNC: 13.6 G/DL (ref 11.7–15.7)
HGB UR QL STRIP: NEGATIVE
KETONES UR STRIP-MCNC: NEGATIVE MG/DL
LEUKOCYTE ESTERASE UR QL STRIP: NEGATIVE
MCH RBC QN AUTO: 32.5 PG (ref 26.5–33)
MCHC RBC AUTO-ENTMCNC: 34.6 G/DL (ref 31.5–36.5)
MCV RBC AUTO: 94 FL (ref 78–100)
NITRATE UR QL: NEGATIVE
PH UR STRIP: 6 [PH] (ref 5–7)
PLATELET # BLD AUTO: 245 10E3/UL (ref 150–450)
RBC # BLD AUTO: 4.18 10E6/UL (ref 3.8–5.2)
RBC #/AREA URNS AUTO: NORMAL /HPF
SP GR UR STRIP: 1.02 (ref 1–1.03)
SPECIMEN EXPIRATION DATE: NORMAL
UROBILINOGEN UR STRIP-ACNC: 0.2 E.U./DL
WBC # BLD AUTO: 7.2 10E3/UL (ref 4–11)
WBC #/AREA URNS AUTO: NORMAL /HPF

## 2021-10-01 PROCEDURE — 90471 IMMUNIZATION ADMIN: CPT | Performed by: OBSTETRICS & GYNECOLOGY

## 2021-10-01 PROCEDURE — 87491 CHLMYD TRACH DNA AMP PROBE: CPT | Performed by: OBSTETRICS & GYNECOLOGY

## 2021-10-01 PROCEDURE — G0145 SCR C/V CYTO,THINLAYER,RESCR: HCPCS | Performed by: OBSTETRICS & GYNECOLOGY

## 2021-10-01 PROCEDURE — 85027 COMPLETE CBC AUTOMATED: CPT | Performed by: OBSTETRICS & GYNECOLOGY

## 2021-10-01 PROCEDURE — 86900 BLOOD TYPING SEROLOGIC ABO: CPT | Performed by: OBSTETRICS & GYNECOLOGY

## 2021-10-01 PROCEDURE — 86850 RBC ANTIBODY SCREEN: CPT | Performed by: OBSTETRICS & GYNECOLOGY

## 2021-10-01 PROCEDURE — 76817 TRANSVAGINAL US OBSTETRIC: CPT | Performed by: OBSTETRICS & GYNECOLOGY

## 2021-10-01 PROCEDURE — 81001 URINALYSIS AUTO W/SCOPE: CPT | Performed by: OBSTETRICS & GYNECOLOGY

## 2021-10-01 PROCEDURE — 99207 PR FIRST OB VISIT: CPT | Performed by: OBSTETRICS & GYNECOLOGY

## 2021-10-01 PROCEDURE — 87624 HPV HI-RISK TYP POOLED RSLT: CPT | Performed by: OBSTETRICS & GYNECOLOGY

## 2021-10-01 PROCEDURE — 90686 IIV4 VACC NO PRSV 0.5 ML IM: CPT | Performed by: OBSTETRICS & GYNECOLOGY

## 2021-10-01 PROCEDURE — 87086 URINE CULTURE/COLONY COUNT: CPT | Performed by: OBSTETRICS & GYNECOLOGY

## 2021-10-01 PROCEDURE — 86780 TREPONEMA PALLIDUM: CPT | Performed by: OBSTETRICS & GYNECOLOGY

## 2021-10-01 PROCEDURE — 36415 COLL VENOUS BLD VENIPUNCTURE: CPT | Performed by: OBSTETRICS & GYNECOLOGY

## 2021-10-01 PROCEDURE — 86762 RUBELLA ANTIBODY: CPT | Performed by: OBSTETRICS & GYNECOLOGY

## 2021-10-01 PROCEDURE — 87591 N.GONORRHOEAE DNA AMP PROB: CPT | Performed by: OBSTETRICS & GYNECOLOGY

## 2021-10-01 PROCEDURE — 86803 HEPATITIS C AB TEST: CPT | Performed by: OBSTETRICS & GYNECOLOGY

## 2021-10-01 PROCEDURE — 86901 BLOOD TYPING SEROLOGIC RH(D): CPT | Performed by: OBSTETRICS & GYNECOLOGY

## 2021-10-01 PROCEDURE — 87340 HEPATITIS B SURFACE AG IA: CPT | Performed by: OBSTETRICS & GYNECOLOGY

## 2021-10-01 PROCEDURE — 87389 HIV-1 AG W/HIV-1&-2 AB AG IA: CPT | Performed by: OBSTETRICS & GYNECOLOGY

## 2021-10-01 NOTE — PROGRESS NOTES
[facility]   OB/GYN Clinic    CC: New OB     Subjective:    Allie is a 30 year old  at 9w6d who presents for her initial OB visit. She reports feeling well. Denies any uterine cramping, abdominal pain or vaginal bleeding. Denies nausea and vomiting.       OB History    Para Term  AB Living   3 1 0 1 1 1   SAB TAB Ectopic Multiple Live Births   0 0 0 0 1      # Outcome Date GA Lbr Dylan/2nd Weight Sex Delivery Anes PTL Lv   3 Current            2  06/15/20 36w5d 15:19 / 01:06 2.76 kg (6 lb 1.4 oz) M Vag-Spont EPI Y TERRY      Complications: Fetal Intolerance      Name: Mil      Apgar1: 8  Apgar5: 8   1 AB 18 5w1d    SAB          Past Medical History:   Diagnosis Date     Chickenpox      Febrile seizure (H)     x1       Past Surgical History:   Procedure Laterality Date     EYE SURGERY      left eye     MOUTH SURGERY      wisdom teeth     TONSILLECTOMY & ADENOIDECTOMY      age 8       Current Outpatient Medications   Medication Sig Dispense Refill     levothyroxine (SYNTHROID/LEVOTHROID) 50 MCG tablet Take 1 tablet (50 mcg) by mouth daily 90 tablet 4     Prenatal Vit-Fe Fumarate-FA (PRENATAL MULTIVITAMIN W/IRON) 27-0.8 MG tablet Take 1 tablet by mouth daily         Family History   Problem Relation Age of Onset     Diabetes Maternal Grandmother      Heart Surgery Maternal Grandmother      Other - See Comments Maternal Grandfather         CJD ?       Social History     Tobacco Use     Smoking status: Never Smoker     Smokeless tobacco: Never Used   Substance Use Topics     Alcohol use: Not Currently     Comment: occas-quit with pregnancy       ROS: A 10 pt ROS was completed and found to be negative unless mentioned in the HPI.     Objective:  /81 (BP Location: Left arm, Patient Position: Chair, Cuff Size: Adult Regular)   Pulse 94   Temp 97.7  F (36.5  C) (Tympanic)   Wt 54.4 kg (120 lb)   LMP 2021   Breastfeeding No   BMI 18.54 kg/m      Estimated  "body mass index is 18.54 kg/m  as calculated from the following:    Height as of 21: 1.713 m (5' 7.45\").    Weight as of this encounter: 54.4 kg (120 lb).    Physical Exam:  Gen: Pleasant, talkative female in no apparent distress   Endocrine: Thyroid without enlargement or nodularity   Lymph: no appreciable cervical lymphadenopathy  Respiratory: Lungs clear, breathing comfortably on room air   Cardiac: Regular rate and rhythm with no murmurs, gallops or rubs. Warm and well-perfused.   GI: Abd soft and non-tender  : External genitalia is free of lesion. Urethra and bartholin glands normal.  Vaginal mucosa is moist and pink without unusual discharge.  Cervix is without lesions or discharge.  Pap smear and endocervical sampling obtained without incident. Bimanual exam reveals mobile anteverted uterus without cervical motion tenderness.  Adenexa are mobile and non-tender bilaterally. No appreciable adnexal enlargement. Uterus is consistent with a 9 week gestation.   MSK: Grossly normal movement of all four extremities  Psych: mood and affect bright   Lower extremity: edema not present     Transvaginal US:  Single IUP measuring 9w4d c/w LMP. No adnexal masses.     Assessment/Plan:   30 year old  at 9w6d who presents for her initial OB visit.   1) Plan to draw new OB lab today (T&S, CBC, HIV, RPR, HepBsAg, Hep B antibody, rubella, GC/Chlam, UC)  2) Discussed routine prenatal care, group practice model at [facility], tertiary support and frequency of visits. Options for  testing for chromosomal and birth defects were discussed with the patient including nuchal translucency/blood marker testing in the first trimester, progenity and quad screening and/or Level 2 ultrasound in the second trimester. We discussed that these are screening tests and not diagnostic tests and that false positives and negatives are a distinct possibility. We discussed that follow up diagnostic testing would include " chorionic villus sampling or amniocentesis depending on gestational age. Patient desires NIPT for genetic testing. Discussed risk of zika infection with travel and subsequent pregnancy risks. Referred to CDC for further information.   3) Plan for anatomy US at 20w  4) PMH/OBHx problems:    -hx of PTD at 36w: PPROM at 36w5d, discussed cervical lengths as well as option for Jeanine, pt agreeable to cervical lengths, thinking about Jeanine   -subclinical hypothyroidism, on synthroid- plan for TSH today, qtrimester  5) Medication review: no changes, continue prenatal vitamin   6) Immunizations: flu shot given, Tdap at 28wks    Return to clinic in 4 weeks.     Khadijah Ramires MD   10/1/2021 1:14 PM

## 2021-10-01 NOTE — NURSING NOTE
"Initial /81 (BP Location: Left arm, Patient Position: Chair, Cuff Size: Adult Regular)   Pulse 94   Temp 97.7  F (36.5  C) (Tympanic)   Wt 54.4 kg (120 lb)   LMP 07/24/2021   Breastfeeding No   BMI 18.54 kg/m   Estimated body mass index is 18.54 kg/m  as calculated from the following:    Height as of 6/9/21: 1.713 m (5' 7.45\").    Weight as of this encounter: 54.4 kg (120 lb). .    Noemí Fernando MA    "

## 2021-10-02 LAB
C TRACH DNA SPEC QL PROBE+SIG AMP: NEGATIVE
N GONORRHOEA DNA SPEC QL NAA+PROBE: NEGATIVE
T PALLIDUM AB SER QL: NONREACTIVE

## 2021-10-03 LAB — BACTERIA UR CULT: NORMAL

## 2021-10-04 LAB
HBV SURFACE AG SERPL QL IA: NONREACTIVE
HCV AB SERPL QL IA: NONREACTIVE
HIV 1+2 AB+HIV1 P24 AG SERPL QL IA: NONREACTIVE
RUBV IGG SERPL QL IA: 4.58 INDEX
RUBV IGG SERPL QL IA: POSITIVE

## 2021-10-05 LAB
BKR LAB AP GYN ADEQUACY: NORMAL
BKR LAB AP GYN INTERPRETATION: NORMAL
BKR LAB AP HPV REFLEX: NORMAL
BKR LAB AP LMP: NORMAL
BKR LAB AP PREVIOUS ABNORMAL: NORMAL
PATH REPORT.COMMENTS IMP SPEC: NORMAL
PATH REPORT.RELEVANT HX SPEC: NORMAL

## 2021-10-11 LAB
HUMAN PAPILLOMA VIRUS 16 DNA: NEGATIVE
HUMAN PAPILLOMA VIRUS 18 DNA: NEGATIVE
HUMAN PAPILLOMA VIRUS FINAL DIAGNOSIS: NORMAL
HUMAN PAPILLOMA VIRUS OTHER HR: NEGATIVE

## 2021-10-21 ENCOUNTER — TELEPHONE (OUTPATIENT)
Dept: SCHEDULING | Facility: CLINIC | Age: 31
End: 2021-10-21

## 2021-10-21 ENCOUNTER — TELEPHONE (OUTPATIENT)
Dept: FAMILY MEDICINE | Facility: CLINIC | Age: 31
End: 2021-10-21

## 2021-10-21 NOTE — TELEPHONE ENCOUNTER
Spoke with patient and apologized however [facility] currently has no OB providers and only Midwives. Patient states that she will have to wait and check with insurance first as she may have to change her primary care designation. Caller relayed that  and [facility] ([facility]) do have OB providers as well but declined to schedule till she checks insurance. If patient calls back please assist in scheduling with OB provider   Dereje Bean CMA on 10/21/2021 at 10:25 AM

## 2021-10-21 NOTE — TELEPHONE ENCOUNTER
Reason for Call:  Other appointment    Detailed comments: OB visit, patient seen on 10/1 at [facility] for 1st OB visit and would like to transfer care to [facility].     Phone Number Patient can be reached at: Home number on file [phone] (home)    Best Time: any    Can we leave a detailed message on this number? YES    Call taken on 10/21/2021 at 9:05 AM by Juanita Woo

## 2021-10-21 NOTE — TELEPHONE ENCOUNTER
Reason for Call:  Other appointment    Detailed comments: patient would like to transfer her OB care to a provider at [place]     Phone Number Patient can be reached at: Cell number on file:    Telephone Information:   Mobile [phone]       Best Time: any    Can we leave a detailed message on this number? YES    Call taken on 10/21/2021 at 11:09 AM by Kelley Grande

## 2021-10-25 ENCOUNTER — TELEPHONE (OUTPATIENT)
Dept: SCHEDULING | Facility: CLINIC | Age: 31
End: 2021-10-25

## 2021-10-25 NOTE — TELEPHONE ENCOUNTER
PE.6, on 20  Previous: 5.3, on 10/1/20    ORT: 3 on 20     OSWESTRY: 28% on 2021    HPI Initial (Portions of this note are brought forward from Ricci Naidu MD initial note on 19; reviewed and edited as appropriate):  Mari Staples is a 58 year old female with chronic neck, upper back, lower back, bilateral knees, bilateral hands and feet pain as described below. Patient states her low back is her most significant concern. Referred by Augusto Carbajal MD for consultation and management.     Pain Score (0-10): Current 5/10; Worst 9/10;  Least 3/10;  Average 5/10;  Acceptable 2/10  Duration: 10 years  Context of pain: Her multiple joint pains started insidiously without event or trauma that the patient can recall.   Location:  neck, upper back, lower back, bilateral knees, bilateral hands and feet  Radiation: low back - right thigh  Quality: aching, burning  Exacerbates:? Low back - walking, standing, bending, lifting   Improves: None     Numbness/Tingling: None  Weakness: right arm  Sleep: 4 hours interrupted  Mood: angry   ?Bowel and bladder - Denies new onset incontinence, or saddle anesthesia.    Interventions (from last visit with updates)  1.  Injections:    · Dr. Sullivan Bilateral SI joint injections - effective  · 2020 Bilateral lumbar medial branch L3, L4, L5 with Dr. Wynne  · 2020 Bilateral trapezius, rhomboid, paracervicals trigger point injection with Dr. Wynne  · 2020  Bilateral lumbar paraspinal, lumborum quadratus and multifidus trigger point injection  · 2020 RFA L3 L4 L5 Right with Dr. Wynne 90% relief  · 2020 RFA L3 L4 71% relief L5 Left with Dr. Wynne  · 2020-Sacroiliac Injection,Right  · 2020-Medial Branch Block, Bilateral,Cervical,C3, Second Diagnostic Block  · 2020-Sacroiliac Injection,Right 90% relief  · 10/7/20 - Radiofrequency Ablation Medial Branch, Right, Cervical, C3, C4, C5 - 100% relief as of 20  · 10/21/20 - Radiofrequency  Patient scheduled with Dr. Byrd for 10/29 14 week OB.   Ablation Medial Branch, Left, Cervical, C3, C4, C5 - 71% relief as of 1/6/20    2. Physical Therapy: Not currently a structured PT program. Previous completed     3. Surgeries (Spine, Joint, related to pain):   · Lumbar laminectomy and fusion L5-S1    4. Medications (pain/mood/depression): (with doses, duration of use, side effects, etc...)  Current  · Gabapentin 600 mg TID - per patient \"do not believe med is doing much for pain\"  · Amitriptyline 50 mg QHS PRN  · Naltrexone 4.5 mg Q HS  · Tizanidine 2 mg Q HS PRN   · Acetaminophen 1000 mg TID PRN    Previous  · Lyrica  · Flexeril  · Hydrocodone   · Percocet  · Tramadol  · Aleve  · Naltrexone  · Meloxicam  · Cymbalta 30 mg daily  · Trazodone 100 mg Q HS  · Cyclobenzaprine 10mg TID PRN     5.  Other  · Ice - somewhat effective  · Dry needling - effective  · Massage - effective  If on contract (update)  • Urine tox screen: N/A  • Prescription Drug Monitoring Program verified this visit.  • Opioid contract: N/A

## 2021-10-25 NOTE — TELEPHONE ENCOUNTER
This patient was seen once in [place]; then she wanted [place], then [place], then [place]. Now asking for [place]. Please make an appointment with Dr. Hillary Ansari

## 2021-10-25 NOTE — TELEPHONE ENCOUNTER
Reason for Call:  Other appointment    Detailed comments: Patient is 13 weeks and has been seen at [place] but would like to be seen at [facility].      Phone Number Patient can be reached at: Home number on file [phone] (home)    Best Time: Anytime    Can we leave a detailed message on this number? YES    Call taken on 10/25/2021 at 7:57 AM by Delmi Patel

## 2021-10-27 ENCOUNTER — HOSPITAL ENCOUNTER (OUTPATIENT)
Facility: CLINIC | Age: 31
End: 2021-10-27
Admitting: OBSTETRICS & GYNECOLOGY
Payer: COMMERCIAL

## 2021-10-29 ENCOUNTER — PRENATAL OFFICE VISIT (OUTPATIENT)
Dept: FAMILY MEDICINE | Facility: CLINIC | Age: 31
End: 2021-10-29
Payer: COMMERCIAL

## 2021-10-29 VITALS
BODY MASS INDEX: 18.36 KG/M2 | DIASTOLIC BLOOD PRESSURE: 74 MMHG | HEART RATE: 62 BPM | WEIGHT: 118.8 LBS | SYSTOLIC BLOOD PRESSURE: 120 MMHG

## 2021-10-29 DIAGNOSIS — Z86.19 HISTORY OF PARVOVIRUS B19 INFECTION: ICD-10-CM

## 2021-10-29 DIAGNOSIS — Z32.01 POSITIVE BLOOD PREGNANCY TEST: Primary | ICD-10-CM

## 2021-10-29 DIAGNOSIS — Z33.1 PREGNANCY, INCIDENTAL: ICD-10-CM

## 2021-10-29 DIAGNOSIS — E03.9 HYPOTHYROIDISM, UNSPECIFIED TYPE: ICD-10-CM

## 2021-10-29 DIAGNOSIS — Z23 COVID-19 VACCINE ADMINISTERED: ICD-10-CM

## 2021-10-29 DIAGNOSIS — Z23 HIGH PRIORITY FOR 2019-NCOV VACCINE: ICD-10-CM

## 2021-10-29 LAB — TSH SERPL DL<=0.005 MIU/L-ACNC: 0.46 UIU/ML (ref 0.3–5)

## 2021-10-29 PROCEDURE — 99207 PR PRENATAL VISIT: CPT | Performed by: FAMILY MEDICINE

## 2021-10-29 PROCEDURE — 84443 ASSAY THYROID STIM HORMONE: CPT | Performed by: FAMILY MEDICINE

## 2021-10-29 PROCEDURE — 86747 PARVOVIRUS ANTIBODY: CPT | Mod: 90 | Performed by: FAMILY MEDICINE

## 2021-10-29 PROCEDURE — 36415 COLL VENOUS BLD VENIPUNCTURE: CPT | Performed by: FAMILY MEDICINE

## 2021-10-29 PROCEDURE — 91301 COVID-19,PF,MODERNA (18+ YRS BOOSTER .25ML): CPT | Performed by: FAMILY MEDICINE

## 2021-10-29 PROCEDURE — 0064A COVID-19,PF,MODERNA (18+ YRS BOOSTER .25ML): CPT | Performed by: FAMILY MEDICINE

## 2021-10-29 PROCEDURE — 99000 SPECIMEN HANDLING OFFICE-LAB: CPT | Performed by: FAMILY MEDICINE

## 2021-10-29 NOTE — PROGRESS NOTES
"We discussed transfer of care from a clinic in [place] with OB to this clinic where she would have a stable provider and deliver preferably at [facility].  They recently moved to [place] from [facility].    Her last baby was born at 36-5/7 weeks gestation with spontaneous rupture of membranes.  He was 6 pounds 1ounce and did not really have too many difficulties.  She was previously seen by an OB/GYN who thought that she was \"borderline\" for whether or not she qualified to use [place] for  birth prevention.  We discussed Jeanine being weekly and cervical length ultrasounds and I think because she was over 36 weeks I will get M opinion on whether or not that would be a reasonable plan of action for her.    We reviewed prenatal cares that are pretty similar to what she had with her previous pregnancy.  Her son is now 16 months old.    I reviewed the labs that were completed at her last visit and the ultrasound that was completed and on 10/1/2021 she was 9 weeks 4 days.    Fetal heart tones were heard and anticipatory guidance was discussed.  "

## 2021-10-29 NOTE — PROGRESS NOTES
We will get MFM consult to see if she is a candidate for [place]/cervical length ultrasounds to prevent  birth

## 2021-11-01 ENCOUNTER — TRANSCRIBE ORDERS (OUTPATIENT)
Dept: MATERNAL FETAL MEDICINE | Facility: CLINIC | Age: 31
End: 2021-11-01

## 2021-11-01 ENCOUNTER — MYC MEDICAL ADVICE (OUTPATIENT)
Dept: FAMILY MEDICINE | Facility: CLINIC | Age: 31
End: 2021-11-01

## 2021-11-01 DIAGNOSIS — O09.899 HISTORY OF PRETERM DELIVERY, CURRENTLY PREGNANT: Primary | ICD-10-CM

## 2021-11-01 DIAGNOSIS — O26.90 PREGNANCY RELATED CONDITION, ANTEPARTUM: Primary | ICD-10-CM

## 2021-11-03 LAB
B19V IGG SER IA-ACNC: 4.1 IV
B19V IGM SER IA-ACNC: 0.15 IV

## 2021-11-08 LAB — SCANNED LAB RESULT: NORMAL

## 2021-11-11 ENCOUNTER — PRE VISIT (OUTPATIENT)
Dept: MATERNAL FETAL MEDICINE | Facility: HOSPITAL | Age: 31
End: 2021-11-11
Payer: COMMERCIAL

## 2021-11-12 ENCOUNTER — OFFICE VISIT (OUTPATIENT)
Dept: MATERNAL FETAL MEDICINE | Facility: HOSPITAL | Age: 31
End: 2021-11-12
Attending: FAMILY MEDICINE
Payer: COMMERCIAL

## 2021-11-12 ENCOUNTER — ANCILLARY PROCEDURE (OUTPATIENT)
Dept: ULTRASOUND IMAGING | Facility: HOSPITAL | Age: 31
End: 2021-11-12
Attending: OBSTETRICS & GYNECOLOGY
Payer: COMMERCIAL

## 2021-11-12 DIAGNOSIS — O09.899 HISTORY OF PRETERM DELIVERY, CURRENTLY PREGNANT: Primary | ICD-10-CM

## 2021-11-12 DIAGNOSIS — O09.899 HISTORY OF PRETERM DELIVERY, CURRENTLY PREGNANT: ICD-10-CM

## 2021-11-12 DIAGNOSIS — O26.90 PREGNANCY RELATED CONDITION, ANTEPARTUM: ICD-10-CM

## 2021-11-12 PROCEDURE — 76817 TRANSVAGINAL US OBSTETRIC: CPT | Mod: 26 | Performed by: OBSTETRICS & GYNECOLOGY

## 2021-11-12 PROCEDURE — 76817 TRANSVAGINAL US OBSTETRIC: CPT

## 2021-11-12 PROCEDURE — 99202 OFFICE O/P NEW SF 15 MIN: CPT | Mod: 25 | Performed by: OBSTETRICS & GYNECOLOGY

## 2021-11-12 NOTE — PROGRESS NOTES
"Please see \"Imaging\" tab under Chart Review for full details.    Ava Whitaker MD  Maternal Fetal Medicine    "

## 2021-11-18 ENCOUNTER — PRENATAL OFFICE VISIT (OUTPATIENT)
Dept: FAMILY MEDICINE | Facility: CLINIC | Age: 31
End: 2021-11-18
Payer: COMMERCIAL

## 2021-11-18 VITALS
DIASTOLIC BLOOD PRESSURE: 58 MMHG | BODY MASS INDEX: 19.12 KG/M2 | HEART RATE: 66 BPM | SYSTOLIC BLOOD PRESSURE: 118 MMHG | WEIGHT: 123.7 LBS

## 2021-11-18 DIAGNOSIS — Z34.82 ENCOUNTER FOR SUPERVISION OF OTHER NORMAL PREGNANCY IN SECOND TRIMESTER: Primary | ICD-10-CM

## 2021-11-18 PROCEDURE — 99207 PR PRENATAL VISIT: CPT | Performed by: FAMILY MEDICINE

## 2021-11-18 NOTE — PROGRESS NOTES
MFM consult and no 17-OH progesterone this pregnancy. Will do ultrasounds as needed to check cervical length

## 2021-11-18 NOTE — PROGRESS NOTES
Concerns: discussed MFM consult  Doing well.  No concerns today.  Discussed anenatal screening.  She accepts testing at this time.  Will schedule anatomy ultrasound.  RTC 4 weeks.      Renay Byrd MD

## 2021-11-24 ENCOUNTER — PRE VISIT (OUTPATIENT)
Dept: MATERNAL FETAL MEDICINE | Facility: HOSPITAL | Age: 31
End: 2021-11-24

## 2021-11-29 ENCOUNTER — OFFICE VISIT (OUTPATIENT)
Dept: MATERNAL FETAL MEDICINE | Facility: HOSPITAL | Age: 31
End: 2021-11-29
Attending: OBSTETRICS & GYNECOLOGY
Payer: COMMERCIAL

## 2021-11-29 ENCOUNTER — ANCILLARY PROCEDURE (OUTPATIENT)
Dept: ULTRASOUND IMAGING | Facility: HOSPITAL | Age: 31
End: 2021-11-29
Attending: OBSTETRICS & GYNECOLOGY
Payer: COMMERCIAL

## 2021-11-29 DIAGNOSIS — O09.892 HISTORY OF PRETERM DELIVERY, CURRENTLY PREGNANT IN SECOND TRIMESTER: Primary | ICD-10-CM

## 2021-11-29 DIAGNOSIS — O09.899 HISTORY OF PRETERM DELIVERY, CURRENTLY PREGNANT: ICD-10-CM

## 2021-11-29 PROCEDURE — 76817 TRANSVAGINAL US OBSTETRIC: CPT | Mod: 26 | Performed by: OBSTETRICS & GYNECOLOGY

## 2021-11-29 PROCEDURE — 99207 PR NO CHARGE LOS: CPT | Performed by: OBSTETRICS & GYNECOLOGY

## 2021-11-29 PROCEDURE — 76817 TRANSVAGINAL US OBSTETRIC: CPT

## 2021-11-29 NOTE — PROGRESS NOTES
Please see the imaging tab for details of the ultrasound performed today.    Azucena Benitez MD  Specialist in Maternal-Fetal Medicine

## 2021-12-17 ENCOUNTER — OFFICE VISIT (OUTPATIENT)
Dept: MATERNAL FETAL MEDICINE | Facility: HOSPITAL | Age: 31
End: 2021-12-17
Attending: OBSTETRICS & GYNECOLOGY
Payer: COMMERCIAL

## 2021-12-17 ENCOUNTER — ANCILLARY PROCEDURE (OUTPATIENT)
Dept: ULTRASOUND IMAGING | Facility: HOSPITAL | Age: 31
End: 2021-12-17
Attending: OBSTETRICS & GYNECOLOGY
Payer: COMMERCIAL

## 2021-12-17 DIAGNOSIS — O09.899 HISTORY OF PRETERM DELIVERY, CURRENTLY PREGNANT: ICD-10-CM

## 2021-12-17 DIAGNOSIS — O09.892 HISTORY OF PRETERM DELIVERY, CURRENTLY PREGNANT IN SECOND TRIMESTER: Primary | ICD-10-CM

## 2021-12-17 PROCEDURE — 76811 OB US DETAILED SNGL FETUS: CPT | Mod: 26 | Performed by: OBSTETRICS & GYNECOLOGY

## 2021-12-17 PROCEDURE — 76817 TRANSVAGINAL US OBSTETRIC: CPT | Mod: 26 | Performed by: OBSTETRICS & GYNECOLOGY

## 2021-12-17 PROCEDURE — 76817 TRANSVAGINAL US OBSTETRIC: CPT

## 2021-12-17 PROCEDURE — 99207 PR NO CHARGE LOS: CPT | Performed by: OBSTETRICS & GYNECOLOGY

## 2021-12-17 NOTE — PROGRESS NOTES
Allie Ramos was seen for an ultrasound today at the Maternal-Fetal Medicine center.      For the details of the ultrasound please see the report which can be found under the imaging tab.      Juanita Martinez MD  , OB/GYN  Maternal-Fetal Medicine  [email or web address]  [phone] (Main MFM Office)  [phone] or [phone] (for 24 hour MFM questions)  [phone] (Pager)

## 2021-12-20 ENCOUNTER — PRENATAL OFFICE VISIT (OUTPATIENT)
Dept: FAMILY MEDICINE | Facility: CLINIC | Age: 31
End: 2021-12-20
Payer: COMMERCIAL

## 2021-12-20 VITALS
BODY MASS INDEX: 20.41 KG/M2 | WEIGHT: 132.1 LBS | DIASTOLIC BLOOD PRESSURE: 64 MMHG | HEART RATE: 72 BPM | SYSTOLIC BLOOD PRESSURE: 116 MMHG

## 2021-12-20 DIAGNOSIS — Z34.82 ENCOUNTER FOR SUPERVISION OF OTHER NORMAL PREGNANCY IN SECOND TRIMESTER: Primary | ICD-10-CM

## 2021-12-20 PROCEDURE — 99207 PR PRENATAL VISIT: CPT | Performed by: FAMILY MEDICINE

## 2021-12-20 NOTE — PROGRESS NOTES
Concerns today: level II all okay  No nausea/vomiting. No heartburn.  No vaginal bleeding, no contractions/severe cramping, no leakage of fluid.  No vaginal discharge. No dysuria  No headache, vision changes, lower extremity swelling, upper abdominal pain, chest pain, shortness of breath.       Renay Byrd MD

## 2021-12-31 ENCOUNTER — ANCILLARY PROCEDURE (OUTPATIENT)
Dept: ULTRASOUND IMAGING | Facility: HOSPITAL | Age: 31
End: 2021-12-31
Attending: OBSTETRICS & GYNECOLOGY
Payer: COMMERCIAL

## 2021-12-31 ENCOUNTER — OFFICE VISIT (OUTPATIENT)
Dept: MATERNAL FETAL MEDICINE | Facility: HOSPITAL | Age: 31
End: 2021-12-31
Attending: OBSTETRICS & GYNECOLOGY
Payer: COMMERCIAL

## 2021-12-31 DIAGNOSIS — O09.892 HISTORY OF PRETERM DELIVERY, CURRENTLY PREGNANT IN SECOND TRIMESTER: ICD-10-CM

## 2021-12-31 DIAGNOSIS — O09.892 HISTORY OF PRETERM DELIVERY, CURRENTLY PREGNANT IN SECOND TRIMESTER: Primary | ICD-10-CM

## 2021-12-31 PROCEDURE — 76817 TRANSVAGINAL US OBSTETRIC: CPT

## 2021-12-31 PROCEDURE — 99207 PR NO CHARGE LOS: CPT | Performed by: OBSTETRICS & GYNECOLOGY

## 2021-12-31 PROCEDURE — 76817 TRANSVAGINAL US OBSTETRIC: CPT | Mod: 26 | Performed by: OBSTETRICS & GYNECOLOGY

## 2022-01-17 LAB
GLUCOSE 1H P 50 G GLC PO SERPL-MCNC: 96 MG/DL (ref 70–129)
HGB BLD-MCNC: 12.2 G/DL (ref 11.7–15.7)

## 2022-01-17 PROCEDURE — 82950 GLUCOSE TEST: CPT | Performed by: FAMILY MEDICINE

## 2022-01-17 PROCEDURE — 36415 COLL VENOUS BLD VENIPUNCTURE: CPT | Performed by: FAMILY MEDICINE

## 2022-01-21 ENCOUNTER — PRENATAL OFFICE VISIT (OUTPATIENT)
Dept: FAMILY MEDICINE | Facility: CLINIC | Age: 32
End: 2022-01-21
Payer: COMMERCIAL

## 2022-01-21 VITALS
WEIGHT: 135.4 LBS | SYSTOLIC BLOOD PRESSURE: 122 MMHG | HEART RATE: 86 BPM | BODY MASS INDEX: 20.92 KG/M2 | DIASTOLIC BLOOD PRESSURE: 72 MMHG

## 2022-01-21 DIAGNOSIS — Z34.82 ENCOUNTER FOR SUPERVISION OF OTHER NORMAL PREGNANCY IN SECOND TRIMESTER: ICD-10-CM

## 2022-01-21 PROCEDURE — 99207 PR PRENATAL VISIT: CPT | Performed by: FAMILY MEDICINE

## 2022-01-21 NOTE — PROGRESS NOTES
Concerns: passed GTT  No vaginal bleeding, no contractions, no leakage of fluid  No nausea/vomiting. No heartburn  No vaginal discharge. No dysuria.   No headache, vision changes, lower extremity swelling, upper abdominal pain, chest pain, shortness of breath  Tdap planned next visit  Discussed PTL, PROM, and when to call or come in.  Normal anatomy ultrasound.  RTC 4 weeks.  GTT and labs today       Renay Byrd MD

## 2022-01-30 ENCOUNTER — NURSE TRIAGE (OUTPATIENT)
Dept: NURSING | Facility: CLINIC | Age: 32
End: 2022-01-30
Payer: COMMERCIAL

## 2022-01-30 ENCOUNTER — HOSPITAL ENCOUNTER (OUTPATIENT)
Facility: CLINIC | Age: 32
Discharge: HOME | End: 2022-01-30
Attending: OBSTETRICS & GYNECOLOGY | Admitting: OBSTETRICS & GYNECOLOGY
Payer: COMMERCIAL

## 2022-01-30 VITALS
HEART RATE: 86 BPM | TEMPERATURE: 98.5 F | RESPIRATION RATE: 18 BRPM | SYSTOLIC BLOOD PRESSURE: 128 MMHG | DIASTOLIC BLOOD PRESSURE: 75 MMHG

## 2022-01-30 PROBLEM — Z36.89 ENCOUNTER FOR TRIAGE IN PREGNANT PATIENT: Status: ACTIVE | Noted: 2022-01-30

## 2022-01-30 LAB
ALBUMIN UR-MCNC: NEGATIVE MG/DL
APPEARANCE UR: CLEAR
BILIRUB UR QL STRIP: NEGATIVE
CLUE CELLS: ABNORMAL
COLOR UR AUTO: NORMAL
GLUCOSE UR STRIP-MCNC: NEGATIVE MG/DL
HGB UR QL STRIP: NEGATIVE
KETONES UR STRIP-MCNC: NEGATIVE MG/DL
LEUKOCYTE ESTERASE UR QL STRIP: NEGATIVE
NITRATE UR QL: NEGATIVE
PH UR STRIP: 6 [PH] (ref 5–7)
RUPTURE OF FETAL MEMBRANES BY ROM PLUS: NEGATIVE
SARS-COV-2 RNA RESP QL NAA+PROBE: NEGATIVE
SP GR UR STRIP: 1.01 (ref 1–1.03)
TRICHOMONAS, WET PREP: ABNORMAL
UROBILINOGEN UR STRIP-MCNC: NORMAL MG/DL
WBC'S/HIGH POWER FIELD, WET PREP: ABNORMAL
YEAST, WET PREP: ABNORMAL

## 2022-01-30 PROCEDURE — 87491 CHLMYD TRACH DNA AMP PROBE: CPT | Performed by: STUDENT IN AN ORGANIZED HEALTH CARE EDUCATION/TRAINING PROGRAM

## 2022-01-30 PROCEDURE — 84112 EVAL AMNIOTIC FLUID PROTEIN: CPT | Performed by: STUDENT IN AN ORGANIZED HEALTH CARE EDUCATION/TRAINING PROGRAM

## 2022-01-30 PROCEDURE — 87210 SMEAR WET MOUNT SALINE/INK: CPT | Performed by: STUDENT IN AN ORGANIZED HEALTH CARE EDUCATION/TRAINING PROGRAM

## 2022-01-30 PROCEDURE — 81003 URINALYSIS AUTO W/O SCOPE: CPT | Performed by: STUDENT IN AN ORGANIZED HEALTH CARE EDUCATION/TRAINING PROGRAM

## 2022-01-30 PROCEDURE — 87635 SARS-COV-2 COVID-19 AMP PRB: CPT | Performed by: STUDENT IN AN ORGANIZED HEALTH CARE EDUCATION/TRAINING PROGRAM

## 2022-01-30 PROCEDURE — 87591 N.GONORRHOEAE DNA AMP PROB: CPT | Performed by: STUDENT IN AN ORGANIZED HEALTH CARE EDUCATION/TRAINING PROGRAM

## 2022-01-30 PROCEDURE — G0463 HOSPITAL OUTPT CLINIC VISIT: HCPCS | Mod: 25

## 2022-01-30 PROCEDURE — C9803 HOPD COVID-19 SPEC COLLECT: HCPCS

## 2022-01-30 NOTE — TELEPHONE ENCOUNTER
"OB Triage Call      Is patient's OB/Midwife with the formerly [facility] or [facility]? [facility]- Is patient's primary OB a Midwife? No- Proceed with triage.     Reason for call: Pt leaked some clear fluid this morning and unsure if she is leaking amniotic fluid. Pt has not had any leaking over last hour. Pt has loss of smell due to congestion from \"just getting over the flu.  Pt had a negative COVID test last week.    Assessment: pt to go to L&D to rule out rupture of membranes.    Plan: Pt plans to delivery at [facility].  [facility] recommended I page on call provider to determine where to go to be evaluated due to gestation. Patient's primary OB Provider is Dr. Byrd and Dr. Benitez is on call and notified of pt's reason for call and MD stated pt needs to be evaluated for rupture of membranes. MD recommended [place] which is on divert and MD notified and stated for pt to go to [facility] .  Report called to [place]  of  L&D charge.     Is patient's delivering hospital on divert? No but [facility] recommended pt be evaluated at a different facility due to gestation so on call provider paged.      27w1d    Estimated Date of Delivery: 2022        OB History    Para Term  AB Living   3 1 0 1 1 1   SAB IAB Ectopic Multiple Live Births   0 0 0 0 1      # Outcome Date GA Lbr Dylan/2nd Weight Sex Delivery Anes PTL Lv   3 Current            2  06/15/20 36w5d 15:19 / 01:06 2.76 kg (6 lb 1.4 oz) M Vag-Spont EPI Y TERRY      Complications: Fetal Intolerance      Name: Mil      Apgar1: 8  Apgar5: 8   1 AB 18 5w1d    SAB          Lab Results   Component Value Date    GBS Negative 2020          Janine Paiz, RN 22 3:00 PM  [facility] Nurse Advisor      Reason for Disposition    Leakage of fluid from vagina    Additional Information    Negative: SEVERE constant abdominal pain (e.g., excruciating)    Negative: SEVERE bleeding (e.g., continuous red blood from " "vagina, or large blood clots)    Negative: Umbilical cord hanging out of the vagina (shiny, white, curled appearance, \"like telephone cord\")    Negative: Uncontrollable urge to push (i.e., feels like baby is coming out now)    Negative: Can see baby    Negative: Sounds like a life-threatening emergency to the triager    Negative: < 20 weeks pregnant    Negative: Vaginal bleeding    Protocols used: PREGNANCY - RUPTURE OF [id number]      "

## 2022-01-30 NOTE — PLAN OF CARE
Patient reports possible fluid leak this morning, rand pad has been dry this afternoon. EFM applied, ROM Plus and UA sent. Covid test also sent as patient has had cold like symptoms x8 days. Negative cor Covid on 1/27. EFM applied, will await results for further assessment. Dr Appiah aware of patient arrival.

## 2022-01-31 LAB
C TRACH DNA SPEC QL NAA+PROBE: NEGATIVE
N GONORRHOEA DNA SPEC QL NAA+PROBE: NEGATIVE

## 2022-01-31 NOTE — DISCHARGE INSTRUCTIONS
Discharge Instruction for Undelivered Patients      You were seen for: Membrane Assessment  We Consulted: Dr Perez  You had (Test or Medicine): fetal monitoring, labs, ROM Plus, Covid test     Diet:   Drink 8 to 12 glasses of liquids (milk, juice, water) every day.  You may eat meals and snacks.     Activity:  Call your doctor or nurse midwife if your baby is moving less than usual.     Call your provider if you notice:    Signs of bladder infection: pain when you urinate (use the toilet), need to go more often and more urgently.  The bag of weiss (rupture of membranes) breaks, or you notice leaking in your underwear.  Bright red blood in your underwear.  Abdominal (lower belly) or stomach pain.  *If less than 34 weeks: Contractions (tightening) more than 6 times in one hour.  Increase or change in vaginal discharge (note the color and amount)    Follow-up:  As scheduled in the clinic

## 2022-02-17 ENCOUNTER — PRENATAL OFFICE VISIT (OUTPATIENT)
Dept: FAMILY MEDICINE | Facility: CLINIC | Age: 32
End: 2022-02-17
Payer: COMMERCIAL

## 2022-02-17 VITALS
WEIGHT: 141.5 LBS | BODY MASS INDEX: 21.87 KG/M2 | HEART RATE: 76 BPM | DIASTOLIC BLOOD PRESSURE: 68 MMHG | SYSTOLIC BLOOD PRESSURE: 124 MMHG

## 2022-02-17 DIAGNOSIS — Z34.83 ENCOUNTER FOR SUPERVISION OF OTHER NORMAL PREGNANCY IN THIRD TRIMESTER: Primary | ICD-10-CM

## 2022-02-17 PROCEDURE — 90471 IMMUNIZATION ADMIN: CPT | Performed by: FAMILY MEDICINE

## 2022-02-17 PROCEDURE — 99207 PR PRENATAL VISIT: CPT | Performed by: FAMILY MEDICINE

## 2022-02-17 PROCEDURE — 90715 TDAP VACCINE 7 YRS/> IM: CPT | Performed by: FAMILY MEDICINE

## 2022-02-17 NOTE — PROGRESS NOTES
Concerns: travel limitations  Doing well.  No concerns today.  No vaginal bleeding, LOF.  No contractions.  Discussed kick counts and fetal movement.  Discussed PTL, PROM, and when to call or come in.  RTC 2 weeks.    Renay Byrd MD

## 2022-03-03 ENCOUNTER — PRENATAL OFFICE VISIT (OUTPATIENT)
Dept: FAMILY MEDICINE | Facility: CLINIC | Age: 32
End: 2022-03-03
Payer: COMMERCIAL

## 2022-03-03 VITALS
WEIGHT: 142.7 LBS | SYSTOLIC BLOOD PRESSURE: 116 MMHG | DIASTOLIC BLOOD PRESSURE: 62 MMHG | BODY MASS INDEX: 22.05 KG/M2 | HEART RATE: 74 BPM

## 2022-03-03 DIAGNOSIS — Z34.83 ENCOUNTER FOR SUPERVISION OF OTHER NORMAL PREGNANCY IN THIRD TRIMESTER: Primary | ICD-10-CM

## 2022-03-03 PROCEDURE — 99207 PR PRENATAL VISIT: CPT | Performed by: FAMILY MEDICINE

## 2022-03-03 NOTE — PROGRESS NOTES
Concerns: feeling well  Doing well.  No concerns today.  No vaginal bleeding, LOF.  No contractions.  Discussed kick counts and fetal movement.  Discussed PTL, PROM, and when to call or come in.  RTC 2 weeks.    Renay Byrd MD

## 2022-03-17 ENCOUNTER — PRENATAL OFFICE VISIT (OUTPATIENT)
Dept: FAMILY MEDICINE | Facility: CLINIC | Age: 32
End: 2022-03-17
Payer: COMMERCIAL

## 2022-03-17 VITALS
HEART RATE: 107 BPM | SYSTOLIC BLOOD PRESSURE: 102 MMHG | WEIGHT: 145.2 LBS | RESPIRATION RATE: 17 BRPM | BODY MASS INDEX: 22.44 KG/M2 | DIASTOLIC BLOOD PRESSURE: 68 MMHG | OXYGEN SATURATION: 99 %

## 2022-03-17 DIAGNOSIS — Z34.83 ENCOUNTER FOR SUPERVISION OF OTHER NORMAL PREGNANCY IN THIRD TRIMESTER: Primary | ICD-10-CM

## 2022-03-17 PROCEDURE — 99207 PR PRENATAL VISIT: CPT | Performed by: FAMILY MEDICINE

## 2022-03-17 NOTE — PROGRESS NOTES
Concerns: none, doing well   Doing well.  No concerns today.  No contractions.  Cervix check next visit.  Discussed kick counts and fetal movement.  Discussed PTL, PROM, and when to call or come in.  RTC 2 weeks. Check Thyroid and hemoglobin    Renay Byrd MD

## 2022-03-31 ENCOUNTER — PRENATAL OFFICE VISIT (OUTPATIENT)
Dept: FAMILY MEDICINE | Facility: CLINIC | Age: 32
End: 2022-03-31
Payer: COMMERCIAL

## 2022-03-31 VITALS
WEIGHT: 149.9 LBS | DIASTOLIC BLOOD PRESSURE: 72 MMHG | BODY MASS INDEX: 23.17 KG/M2 | HEART RATE: 74 BPM | SYSTOLIC BLOOD PRESSURE: 128 MMHG

## 2022-03-31 DIAGNOSIS — E03.9 HYPOTHYROIDISM, UNSPECIFIED TYPE: ICD-10-CM

## 2022-03-31 DIAGNOSIS — Z34.83 ENCOUNTER FOR SUPERVISION OF OTHER NORMAL PREGNANCY IN THIRD TRIMESTER: Primary | ICD-10-CM

## 2022-03-31 LAB
HGB BLD-MCNC: 11.9 G/DL (ref 11.7–15.7)
TSH SERPL DL<=0.005 MIU/L-ACNC: 1.17 UIU/ML (ref 0.3–5)

## 2022-03-31 PROCEDURE — 84443 ASSAY THYROID STIM HORMONE: CPT | Performed by: FAMILY MEDICINE

## 2022-03-31 PROCEDURE — 99207 PR PRENATAL VISIT: CPT | Performed by: FAMILY MEDICINE

## 2022-03-31 PROCEDURE — 36415 COLL VENOUS BLD VENIPUNCTURE: CPT | Performed by: FAMILY MEDICINE

## 2022-03-31 PROCEDURE — 87653 STREP B DNA AMP PROBE: CPT | Performed by: FAMILY MEDICINE

## 2022-03-31 NOTE — PROGRESS NOTES
Concerns: delivered at about this gestation last time  .  No vaginal bleeding, LOF, contractions.  No HA, RUQ pain, N/V, visual changes.  GBS done today.  Labor precautions discussed.  RTC 1 week.  Prenatal flowsheet information is reviewed.  Hemoglobin and TSH today    Renay Byrd MD

## 2022-04-01 LAB — GP B STREP DNA SPEC QL NAA+PROBE: NEGATIVE

## 2022-04-07 ENCOUNTER — PRENATAL OFFICE VISIT (OUTPATIENT)
Dept: FAMILY MEDICINE | Facility: CLINIC | Age: 32
End: 2022-04-07
Payer: COMMERCIAL

## 2022-04-07 VITALS
HEART RATE: 92 BPM | OXYGEN SATURATION: 99 % | DIASTOLIC BLOOD PRESSURE: 72 MMHG | SYSTOLIC BLOOD PRESSURE: 112 MMHG | BODY MASS INDEX: 22.93 KG/M2 | WEIGHT: 148.38 LBS

## 2022-04-07 DIAGNOSIS — Z34.83 ENCOUNTER FOR SUPERVISION OF OTHER NORMAL PREGNANCY IN THIRD TRIMESTER: Primary | ICD-10-CM

## 2022-04-07 LAB — HGB BLD-MCNC: 12.5 G/DL (ref 11.7–15.7)

## 2022-04-07 PROCEDURE — 87653 STREP B DNA AMP PROBE: CPT | Performed by: FAMILY MEDICINE

## 2022-04-07 PROCEDURE — 36415 COLL VENOUS BLD VENIPUNCTURE: CPT | Performed by: FAMILY MEDICINE

## 2022-04-07 PROCEDURE — 99207 PR PRENATAL VISIT: CPT | Performed by: FAMILY MEDICINE

## 2022-04-07 NOTE — PROGRESS NOTES
Concerns: large for dates  .  No vaginal bleeding, LOF, contractions.  No HA, RUQ pain, N/V, visual changes.  Cervix is soft, posterior.  Biophysical profile ordered.  GBS done today.  Labor precautions discussed.  RTC 1 week.  Prenatal flowsheet information is reviewed.    Renay Byrd MD

## 2022-04-08 ENCOUNTER — HOSPITAL ENCOUNTER (OUTPATIENT)
Dept: ULTRASOUND IMAGING | Facility: CLINIC | Age: 32
Discharge: HOME | End: 2022-04-08
Attending: FAMILY MEDICINE | Admitting: FAMILY MEDICINE
Payer: COMMERCIAL

## 2022-04-08 DIAGNOSIS — E03.8 SUBCLINICAL HYPOTHYROIDISM: ICD-10-CM

## 2022-04-08 DIAGNOSIS — Z34.83 ENCOUNTER FOR SUPERVISION OF OTHER NORMAL PREGNANCY IN THIRD TRIMESTER: ICD-10-CM

## 2022-04-08 LAB
ALBUMIN UR-MCNC: NEGATIVE MG/DL
GLUCOSE UR STRIP-MCNC: NEGATIVE MG/DL
GP B STREP DNA SPEC QL NAA+PROBE: NEGATIVE

## 2022-04-08 PROCEDURE — 76819 FETAL BIOPHYS PROFIL W/O NST: CPT

## 2022-04-08 RX ORDER — LEVOTHYROXINE SODIUM 50 UG/1
50 TABLET ORAL DAILY
Qty: 90 TABLET | Refills: 4 | Status: SHIPPED | OUTPATIENT
Start: 2022-04-08 | End: 2023-05-12

## 2022-04-11 ENCOUNTER — HOSPITAL ENCOUNTER (INPATIENT)
Facility: CLINIC | Age: 32
LOS: 2 days | Discharge: HOME HEALTH | End: 2022-04-13
Attending: FAMILY MEDICINE | Admitting: FAMILY MEDICINE
Payer: COMMERCIAL

## 2022-04-11 ENCOUNTER — ANESTHESIA EVENT (OUTPATIENT)
Dept: OBGYN | Facility: CLINIC | Age: 32
End: 2022-04-11
Payer: COMMERCIAL

## 2022-04-11 ENCOUNTER — ANESTHESIA (OUTPATIENT)
Dept: OBGYN | Facility: CLINIC | Age: 32
End: 2022-04-11
Payer: COMMERCIAL

## 2022-04-11 PROBLEM — Z34.90 PREGNANT: Status: ACTIVE | Noted: 2022-04-11

## 2022-04-11 LAB
ABO/RH(D): NORMAL
ANTIBODY SCREEN: NEGATIVE
RUPTURE OF FETAL MEMBRANES BY ROM PLUS: POSITIVE
SARS-COV-2 RNA RESP QL NAA+PROBE: NEGATIVE
SPECIMEN EXPIRATION DATE: NORMAL

## 2022-04-11 PROCEDURE — 120N000001 HC R&B MED SURG/OB

## 2022-04-11 PROCEDURE — 370N000003 HC ANESTHESIA WARD SERVICE

## 2022-04-11 PROCEDURE — 86780 TREPONEMA PALLIDUM: CPT | Performed by: FAMILY MEDICINE

## 2022-04-11 PROCEDURE — 84112 EVAL AMNIOTIC FLUID PROTEIN: CPT | Performed by: FAMILY MEDICINE

## 2022-04-11 PROCEDURE — 86901 BLOOD TYPING SEROLOGIC RH(D): CPT | Performed by: FAMILY MEDICINE

## 2022-04-11 PROCEDURE — 36415 COLL VENOUS BLD VENIPUNCTURE: CPT | Performed by: FAMILY MEDICINE

## 2022-04-11 PROCEDURE — 999N000016 HC STATISTIC ATTENDANCE AT DELIVERY

## 2022-04-11 PROCEDURE — 258N000003 HC RX IP 258 OP 636: Performed by: FAMILY MEDICINE

## 2022-04-11 PROCEDURE — 250N000013 HC RX MED GY IP 250 OP 250 PS 637: Performed by: FAMILY MEDICINE

## 2022-04-11 PROCEDURE — 722N000001 HC LABOR CARE VAGINAL DELIVERY SINGLE

## 2022-04-11 PROCEDURE — 999N000157 HC STATISTIC RCP TIME EA 10 MIN

## 2022-04-11 PROCEDURE — 250N000009 HC RX 250: Performed by: ANESTHESIOLOGY

## 2022-04-11 PROCEDURE — 250N000009 HC RX 250: Performed by: FAMILY MEDICINE

## 2022-04-11 PROCEDURE — 250N000011 HC RX IP 250 OP 636: Performed by: ANESTHESIOLOGY

## 2022-04-11 PROCEDURE — 87635 SARS-COV-2 COVID-19 AMP PRB: CPT | Performed by: FAMILY MEDICINE

## 2022-04-11 PROCEDURE — 250N000011 HC RX IP 250 OP 636: Performed by: FAMILY MEDICINE

## 2022-04-11 PROCEDURE — C9803 HOPD COVID-19 SPEC COLLECT: HCPCS

## 2022-04-11 PROCEDURE — 59400 OBSTETRICAL CARE: CPT | Performed by: FAMILY MEDICINE

## 2022-04-11 PROCEDURE — 258N000003 HC RX IP 258 OP 636: Performed by: ANESTHESIOLOGY

## 2022-04-11 PROCEDURE — 0HQ9XZZ REPAIR PERINEUM SKIN, EXTERNAL APPROACH: ICD-10-PCS | Performed by: FAMILY MEDICINE

## 2022-04-11 RX ORDER — IBUPROFEN 800 MG/1
800 TABLET, FILM COATED ORAL
Status: DISCONTINUED | OUTPATIENT
Start: 2022-04-11 | End: 2022-04-13 | Stop reason: HOSPADM

## 2022-04-11 RX ORDER — ONDANSETRON 4 MG/1
4 TABLET, ORALLY DISINTEGRATING ORAL EVERY 6 HOURS PRN
Status: DISCONTINUED | OUTPATIENT
Start: 2022-04-11 | End: 2022-04-11 | Stop reason: HOSPADM

## 2022-04-11 RX ORDER — MISOPROSTOL 200 UG/1
400 TABLET ORAL
Status: DISCONTINUED | OUTPATIENT
Start: 2022-04-11 | End: 2022-04-11 | Stop reason: HOSPADM

## 2022-04-11 RX ORDER — NALOXONE HYDROCHLORIDE 0.4 MG/ML
0.2 INJECTION, SOLUTION INTRAMUSCULAR; INTRAVENOUS; SUBCUTANEOUS
Status: DISCONTINUED | OUTPATIENT
Start: 2022-04-11 | End: 2022-04-13 | Stop reason: HOSPADM

## 2022-04-11 RX ORDER — CITRIC ACID/SODIUM CITRATE 334-500MG
30 SOLUTION, ORAL ORAL
Status: DISCONTINUED | OUTPATIENT
Start: 2022-04-11 | End: 2022-04-11 | Stop reason: HOSPADM

## 2022-04-11 RX ORDER — OXYTOCIN 10 [USP'U]/ML
10 INJECTION, SOLUTION INTRAMUSCULAR; INTRAVENOUS
Status: DISCONTINUED | OUTPATIENT
Start: 2022-04-11 | End: 2022-04-13 | Stop reason: HOSPADM

## 2022-04-11 RX ORDER — BUPIVACAINE HYDROCHLORIDE 2.5 MG/ML
INJECTION, SOLUTION EPIDURAL; INFILTRATION; INTRACAUDAL PRN
Status: DISCONTINUED | OUTPATIENT
Start: 2022-04-11 | End: 2022-04-11

## 2022-04-11 RX ORDER — DIPHENHYDRAMINE HYDROCHLORIDE 50 MG/ML
25 INJECTION INTRAMUSCULAR; INTRAVENOUS EVERY 6 HOURS PRN
Status: DISCONTINUED | OUTPATIENT
Start: 2022-04-11 | End: 2022-04-13 | Stop reason: HOSPADM

## 2022-04-11 RX ORDER — KETOROLAC TROMETHAMINE 30 MG/ML
30 INJECTION, SOLUTION INTRAMUSCULAR; INTRAVENOUS
Status: DISCONTINUED | OUTPATIENT
Start: 2022-04-11 | End: 2022-04-13 | Stop reason: HOSPADM

## 2022-04-11 RX ORDER — MISOPROSTOL 200 UG/1
800 TABLET ORAL
Status: DISCONTINUED | OUTPATIENT
Start: 2022-04-11 | End: 2022-04-13 | Stop reason: HOSPADM

## 2022-04-11 RX ORDER — MODIFIED LANOLIN
OINTMENT (GRAM) TOPICAL
Status: DISCONTINUED | OUTPATIENT
Start: 2022-04-11 | End: 2022-04-13 | Stop reason: HOSPADM

## 2022-04-11 RX ORDER — METHYLERGONOVINE MALEATE 0.2 MG/ML
200 INJECTION INTRAVENOUS
Status: DISCONTINUED | OUTPATIENT
Start: 2022-04-11 | End: 2022-04-11 | Stop reason: HOSPADM

## 2022-04-11 RX ORDER — MISOPROSTOL 200 UG/1
400 TABLET ORAL
Status: DISCONTINUED | OUTPATIENT
Start: 2022-04-11 | End: 2022-04-13 | Stop reason: HOSPADM

## 2022-04-11 RX ORDER — EPHEDRINE SULFATE 50 MG/ML
10 INJECTION, SOLUTION INTRAMUSCULAR; INTRAVENOUS; SUBCUTANEOUS
Status: DISCONTINUED | OUTPATIENT
Start: 2022-04-11 | End: 2022-04-11 | Stop reason: HOSPADM

## 2022-04-11 RX ORDER — NALOXONE HYDROCHLORIDE 0.4 MG/ML
0.4 INJECTION, SOLUTION INTRAMUSCULAR; INTRAVENOUS; SUBCUTANEOUS
Status: DISCONTINUED | OUTPATIENT
Start: 2022-04-11 | End: 2022-04-13 | Stop reason: HOSPADM

## 2022-04-11 RX ORDER — OXYTOCIN/0.9 % SODIUM CHLORIDE 30/500 ML
340 PLASTIC BAG, INJECTION (ML) INTRAVENOUS CONTINUOUS PRN
Status: DISCONTINUED | OUTPATIENT
Start: 2022-04-11 | End: 2022-04-11 | Stop reason: HOSPADM

## 2022-04-11 RX ORDER — PROCHLORPERAZINE 25 MG
25 SUPPOSITORY, RECTAL RECTAL EVERY 12 HOURS PRN
Status: DISCONTINUED | OUTPATIENT
Start: 2022-04-11 | End: 2022-04-11 | Stop reason: HOSPADM

## 2022-04-11 RX ORDER — NALOXONE HYDROCHLORIDE 0.4 MG/ML
0.2 INJECTION, SOLUTION INTRAMUSCULAR; INTRAVENOUS; SUBCUTANEOUS
Status: DISCONTINUED | OUTPATIENT
Start: 2022-04-11 | End: 2022-04-11 | Stop reason: HOSPADM

## 2022-04-11 RX ORDER — HYDROCORTISONE 2.5 %
CREAM (GRAM) TOPICAL 3 TIMES DAILY PRN
Status: DISCONTINUED | OUTPATIENT
Start: 2022-04-11 | End: 2022-04-13 | Stop reason: HOSPADM

## 2022-04-11 RX ORDER — DIPHENHYDRAMINE HCL 25 MG
25 CAPSULE ORAL EVERY 6 HOURS PRN
Status: DISCONTINUED | OUTPATIENT
Start: 2022-04-11 | End: 2022-04-13 | Stop reason: HOSPADM

## 2022-04-11 RX ORDER — OXYTOCIN 10 [USP'U]/ML
10 INJECTION, SOLUTION INTRAMUSCULAR; INTRAVENOUS
Status: DISCONTINUED | OUTPATIENT
Start: 2022-04-11 | End: 2022-04-11 | Stop reason: HOSPADM

## 2022-04-11 RX ORDER — MISOPROSTOL 200 UG/1
800 TABLET ORAL
Status: DISCONTINUED | OUTPATIENT
Start: 2022-04-11 | End: 2022-04-11 | Stop reason: HOSPADM

## 2022-04-11 RX ORDER — NALOXONE HYDROCHLORIDE 0.4 MG/ML
0.4 INJECTION, SOLUTION INTRAMUSCULAR; INTRAVENOUS; SUBCUTANEOUS
Status: DISCONTINUED | OUTPATIENT
Start: 2022-04-11 | End: 2022-04-11 | Stop reason: HOSPADM

## 2022-04-11 RX ORDER — IBUPROFEN 800 MG/1
800 TABLET, FILM COATED ORAL EVERY 6 HOURS PRN
Status: DISCONTINUED | OUTPATIENT
Start: 2022-04-11 | End: 2022-04-13 | Stop reason: HOSPADM

## 2022-04-11 RX ORDER — CARBOPROST TROMETHAMINE 250 UG/ML
250 INJECTION, SOLUTION INTRAMUSCULAR
Status: DISCONTINUED | OUTPATIENT
Start: 2022-04-11 | End: 2022-04-11 | Stop reason: HOSPADM

## 2022-04-11 RX ORDER — OXYTOCIN/0.9 % SODIUM CHLORIDE 30/500 ML
100-340 PLASTIC BAG, INJECTION (ML) INTRAVENOUS CONTINUOUS PRN
Status: DISCONTINUED | OUTPATIENT
Start: 2022-04-11 | End: 2022-04-13 | Stop reason: HOSPADM

## 2022-04-11 RX ORDER — OXYCODONE HYDROCHLORIDE 5 MG/1
5 TABLET ORAL EVERY 4 HOURS PRN
Status: DISCONTINUED | OUTPATIENT
Start: 2022-04-11 | End: 2022-04-13 | Stop reason: HOSPADM

## 2022-04-11 RX ORDER — METOCLOPRAMIDE 10 MG/1
10 TABLET ORAL EVERY 6 HOURS PRN
Status: DISCONTINUED | OUTPATIENT
Start: 2022-04-11 | End: 2022-04-11 | Stop reason: HOSPADM

## 2022-04-11 RX ORDER — CARBOPROST TROMETHAMINE 250 UG/ML
250 INJECTION, SOLUTION INTRAMUSCULAR
Status: DISCONTINUED | OUTPATIENT
Start: 2022-04-11 | End: 2022-04-13 | Stop reason: HOSPADM

## 2022-04-11 RX ORDER — LIDOCAINE HYDROCHLORIDE AND EPINEPHRINE 15; 5 MG/ML; UG/ML
INJECTION, SOLUTION EPIDURAL PRN
Status: DISCONTINUED | OUTPATIENT
Start: 2022-04-11 | End: 2022-04-11

## 2022-04-11 RX ORDER — PROCHLORPERAZINE MALEATE 10 MG
10 TABLET ORAL EVERY 6 HOURS PRN
Status: DISCONTINUED | OUTPATIENT
Start: 2022-04-11 | End: 2022-04-11 | Stop reason: HOSPADM

## 2022-04-11 RX ORDER — ONDANSETRON 2 MG/ML
4 INJECTION INTRAMUSCULAR; INTRAVENOUS EVERY 6 HOURS PRN
Status: DISCONTINUED | OUTPATIENT
Start: 2022-04-11 | End: 2022-04-11 | Stop reason: HOSPADM

## 2022-04-11 RX ORDER — METOCLOPRAMIDE HYDROCHLORIDE 5 MG/ML
10 INJECTION INTRAMUSCULAR; INTRAVENOUS EVERY 6 HOURS PRN
Status: DISCONTINUED | OUTPATIENT
Start: 2022-04-11 | End: 2022-04-11 | Stop reason: HOSPADM

## 2022-04-11 RX ORDER — BISACODYL 10 MG
10 SUPPOSITORY, RECTAL RECTAL DAILY PRN
Status: DISCONTINUED | OUTPATIENT
Start: 2022-04-11 | End: 2022-04-13 | Stop reason: HOSPADM

## 2022-04-11 RX ORDER — METHYLERGONOVINE MALEATE 0.2 MG/ML
200 INJECTION INTRAVENOUS
Status: DISCONTINUED | OUTPATIENT
Start: 2022-04-11 | End: 2022-04-13 | Stop reason: HOSPADM

## 2022-04-11 RX ORDER — DOCUSATE SODIUM 100 MG/1
100 CAPSULE, LIQUID FILLED ORAL DAILY
Status: DISCONTINUED | OUTPATIENT
Start: 2022-04-11 | End: 2022-04-13 | Stop reason: HOSPADM

## 2022-04-11 RX ORDER — CITRIC ACID/SODIUM CITRATE 334-500MG
30 SOLUTION, ORAL ORAL ONCE
Status: DISCONTINUED | OUTPATIENT
Start: 2022-04-11 | End: 2022-04-11 | Stop reason: HOSPADM

## 2022-04-11 RX ORDER — ACETAMINOPHEN 325 MG/1
650 TABLET ORAL EVERY 4 HOURS PRN
Status: DISCONTINUED | OUTPATIENT
Start: 2022-04-11 | End: 2022-04-13 | Stop reason: HOSPADM

## 2022-04-11 RX ORDER — OXYTOCIN/0.9 % SODIUM CHLORIDE 30/500 ML
340 PLASTIC BAG, INJECTION (ML) INTRAVENOUS CONTINUOUS PRN
Status: DISCONTINUED | OUTPATIENT
Start: 2022-04-11 | End: 2022-04-13 | Stop reason: HOSPADM

## 2022-04-11 RX ORDER — LIDOCAINE 40 MG/G
CREAM TOPICAL
Status: DISCONTINUED | OUTPATIENT
Start: 2022-04-11 | End: 2022-04-11 | Stop reason: HOSPADM

## 2022-04-11 RX ADMIN — LIDOCAINE HYDROCHLORIDE 20 ML: 10 INJECTION, SOLUTION INFILTRATION; PERINEURAL at 18:40

## 2022-04-11 RX ADMIN — SODIUM CHLORIDE, POTASSIUM CHLORIDE, SODIUM LACTATE AND CALCIUM CHLORIDE 1000 ML: 600; 310; 30; 20 INJECTION, SOLUTION INTRAVENOUS at 14:59

## 2022-04-11 RX ADMIN — BUPIVACAINE HYDROCHLORIDE: 7.5 INJECTION, SOLUTION EPIDURAL; RETROBULBAR at 15:54

## 2022-04-11 RX ADMIN — Medication 340 ML/HR: at 18:30

## 2022-04-11 RX ADMIN — LIDOCAINE HYDROCHLORIDE,EPINEPHRINE BITARTRATE 3 ML: 15; .005 INJECTION, SOLUTION EPIDURAL; INFILTRATION; INTRACAUDAL; PERINEURAL at 15:50

## 2022-04-11 RX ADMIN — KETOROLAC TROMETHAMINE 30 MG: 30 INJECTION, SOLUTION INTRAMUSCULAR; INTRAVENOUS at 22:18

## 2022-04-11 RX ADMIN — Medication 5 MG: at 16:37

## 2022-04-11 RX ADMIN — BUPIVACAINE HYDROCHLORIDE 5 ML: 2.5 INJECTION, SOLUTION EPIDURAL; INFILTRATION; INTRACAUDAL at 15:56

## 2022-04-11 RX ADMIN — DOCUSATE SODIUM 100 MG: 100 CAPSULE, LIQUID FILLED ORAL at 22:18

## 2022-04-11 NOTE — ANESTHESIA PREPROCEDURE EVALUATION
Anesthesia Pre-Procedure Evaluation    Patient: Allie Ramos   MRN: [id number] : 1990        Procedure : * No procedures listed *          Past Medical History:   Diagnosis Date     Chickenpox      Febrile seizure (H)     x1      Past Surgical History:   Procedure Laterality Date     EYE SURGERY      left eye     MOUTH SURGERY      wisdom teeth     TONSILLECTOMY & ADENOIDECTOMY      age 8      No Known Allergies   Social History     Tobacco Use     Smoking status: Never Smoker     Smokeless tobacco: Never Used   Substance Use Topics     Alcohol use: Not Currently     Comment: occas-quit with pregnancy      Wt Readings from Last 1 Encounters:   22 67.1 kg (148 lb)        Anesthesia Evaluation            ROS/MED HX  ENT/Pulmonary:  - neg pulmonary ROS     Neurologic:  - neg neurologic ROS     Cardiovascular:  - neg cardiovascular ROS     METS/Exercise Tolerance:     Hematologic:  - neg hematologic  ROS     Musculoskeletal:  - neg musculoskeletal ROS     GI/Hepatic:  - neg GI/hepatic ROS     Renal/Genitourinary:  - neg Renal ROS     Endo:  - neg endo ROS     Psychiatric/Substance Use:  - neg psychiatric ROS     Infectious Disease:  - neg infectious disease ROS     Malignancy:  - neg malignancy ROS     Other:  - neg other ROS    (+) Possibly pregnant, ,         Physical Exam    Airway  airway exam normal      Mallampati: II       Respiratory Devices and Support         Dental  no notable dental history         Cardiovascular   cardiovascular exam normal          Pulmonary   pulmonary exam normal                OUTSIDE LABS:  CBC:   Lab Results   Component Value Date    WBC 7.2 10/01/2021    WBC 12.6 (H) 06/15/2020    HGB 12.5 2022    HGB 11.9 2022    HCT 39.3 10/01/2021    HCT 37.0 06/15/2020     10/01/2021     06/15/2020     BMP:   Lab Results   Component Value Date     12/15/2019     2018    POTASSIUM 3.9 12/15/2019    POTASSIUM 3.5 2018    CHLORIDE  109 12/15/2019    CHLORIDE 105 06/18/2018    CO2 27 12/15/2019    CO2 27 06/18/2018    BUN 5 (L) 12/15/2019    BUN 14 06/18/2018    CR 0.53 12/15/2019    CR 0.83 06/18/2018    GLC 83 12/15/2019    GLC 96 11/11/2019     COAGS: No results found for: PTT, INR, FIBR  POC: No results found for: BGM, HCG, HCGS  HEPATIC:   Lab Results   Component Value Date    ALBUMIN 4.4 12/15/2019    PROTTOTAL 7.7 12/15/2019    ALT 21 12/15/2019    AST 16 12/15/2019    ALKPHOS 34 (L) 12/15/2019    BILITOTAL 0.6 12/15/2019     OTHER:   Lab Results   Component Value Date    A1C 4.9 09/25/2019    BUCK 9.2 12/15/2019    TSH 1.17 03/31/2022    T4 0.94 04/22/2020       Anesthesia Plan    ASA Status:  2      Anesthesia Type: Epidural.              Consents    Anesthesia Plan(s) and associated risks, benefits, and realistic alternatives discussed. Questions answered and patient/representative(s) expressed understanding.    - Discussed:     - Discussed with:  Patient         Postoperative Care            Comments:                Ck Weldon MD

## 2022-04-11 NOTE — ANESTHESIA PROCEDURE NOTES
Epidural catheter Procedure Note    Pre-Procedure   Staff -        Anesthesiologist:  Ck Weldon MD       Performed By: anesthesiologist       Location: OB       Procedure Start/Stop Times: 4/11/2022 3:45 PM and 4/11/2022 4:01 PM       Pre-Anesthestic Checklist: patient identified, IV checked, risks and benefits discussed, informed consent, monitors and equipment checked, pre-op evaluation, at physician/surgeon's request and post-op pain management  Timeout:       Correct Patient: Yes        Correct Procedure: Yes        Correct Site: Yes        Correct Position: Yes   Procedure Documentation  Procedure: epidural catheter       Patient Position: sitting       Skin prep: Chloraprep       Local skin infiltrated with 5 mL of 1% lidocaine.        Insertion Site: L4-5. (midline approach).       Needle Type: Touhy needle       Needle Gauge: 18.        Needle Length (Inches): 3.5        Catheter: 19 G.          Catheter threaded easily.         4 cm epidural space.           # of attempts: 1 and  # of redirects:  0    Assessment/Narrative         Paresthesias: No.       Test dose of 3 mL at.         Test dose negative, 3 minutes after injection, for signs of intravascular, subdural, or intrathecal injection.       Aspiration negative for Heme or CSF via Epidural Catheter.    Medication(s) Administered   Medication Administration Time: 4/11/2022 3:45 PM

## 2022-04-11 NOTE — PROGRESS NOTES
Pt arrived to [facility] c/o ROM. . GBS negative. Pt brought to room , placed on EFM. FHT's reactive, category I. Uterus soft, non tender. Ctx's noted Q 3 mins. BP elevated. Pt breathing with Ctx's. asymptomatic of HTN.  Will check SVE and ROM+ and notify provider.

## 2022-04-11 NOTE — H&P
[facility] Labor and Delivery History and Physical    Allie Ramos MRN# [id number]   Age: 31 year old YOB: 1990     Date of Admission:  2022    Primary care provider: Renay Byrd           Chief Complaint:   Allie Ramos is a 31 year old female who is 37w2d pregnant and being admitted for SROM.          Pregnancy history:     OBSTETRIC HISTORY:    OB History    Para Term  AB Living   3 1 0 1 1 1   SAB IAB Ectopic Multiple Live Births   0 0 0 0 1      # Outcome Date GA Lbr Dylan/2nd Weight Sex Delivery Anes PTL Lv   3 Current            2  06/15/20 36w5d 15:19 / 01:06 2.76 kg (6 lb 1.4 oz) M Vag-Spont EPI Y TERRY      Complications: Fetal Intolerance      Name: Mil      Apgar1: 8  Apgar5: 8   1 AB 18 5w1d    SAB          EDC: Estimated Date of Delivery: 22    Prenatal Labs:   Lab Results   Component Value Date    ABO A 06/15/2020    RH Pos 06/15/2020    AS Negative 2022    HEPBANG Nonreactive 10/01/2021    CHPCRT Negative 2022    GCPCRT Negative 2022    HGB 12.5 2022       GBS Status:   Lab Results   Component Value Date    GBS Negative 2020       Active Problem List  Patient Active Problem List   Diagnosis     Prenatal care, subsequent pregnancy     Encounter for triage in pregnant patient     Pregnant       Medication Prior to Admission  Medications Prior to Admission   Medication Sig Dispense Refill Last Dose     levothyroxine (SYNTHROID/LEVOTHROID) 50 MCG tablet Take 1 tablet (50 mcg) by mouth daily 90 tablet 4      Prenatal Vit-Fe Fumarate-FA (PRENATAL MULTIVITAMIN W/IRON) 27-0.8 MG tablet Take 1 tablet by mouth daily      .        Maternal Past Medical History:     Past Medical History:   Diagnosis Date     Chickenpox      Febrile seizure (H)     x1                       Family History:   I have reviewed this patient's family history            Social History:     Social History     Tobacco Use      Smoking status: Never Smoker     Smokeless tobacco: Never Used   Substance Use Topics     Alcohol use: Not Currently     Comment: occas-quit with pregnancy            Review of Systems:   CONSTITUTIONAL: NEGATIVE for fever, chills, change in weight  ENT/MOUTH: NEGATIVE for ear, mouth and throat problems  RESP: NEGATIVE for significant cough or SOB  CV: NEGATIVE for chest pain, palpitations or peripheral edema          Physical Exam:   Vitals were reviewed  Constitutional:   awake, alert, cooperative, no apparent distress, and appears stated age and in active labor with labor epidural     Eyes:   Lids and lashes normal, pupils equal, round and reactive to light, extra ocular muscles intact, sclera clear, conjunctiva normal     Abdomen:   Gravid non-tender      Cervix:   Membranes: SROM   Dilation: 8   Effacement: 90%   Station:0   Consistency: soft   Position: Anterior  Presentation:Cephalic  Fetal Heart Rate Tracing: reactive and reassuring, She did have a several minute deceleration while lying flat on her back after epidural while catheter was attempting to be placed. The catheter was not placed, the mother repositioned to hands and knees and the FHT recovered nicely.  Category 1 until the late phase of pushing, then was bradycardic due to cord compression  Tocometer: external monitor                       Assessment:   Allie Ramos is a 37w2d pregnant female admitted with active labor management.          Plan:   Admit - see IP orders    Renay Byrd MD

## 2022-04-12 LAB
HGB BLD-MCNC: 11.3 G/DL (ref 11.7–15.7)
T PALLIDUM AB SER QL: NONREACTIVE

## 2022-04-12 PROCEDURE — 85018 HEMOGLOBIN: CPT | Performed by: FAMILY MEDICINE

## 2022-04-12 PROCEDURE — 250N000013 HC RX MED GY IP 250 OP 250 PS 637: Performed by: FAMILY MEDICINE

## 2022-04-12 PROCEDURE — 120N000001 HC R&B MED SURG/OB

## 2022-04-12 PROCEDURE — 99207 PR SUBSEQUENT HOSPITAL CARE FOR MOTHER, 15 MINUTES: CPT | Performed by: FAMILY MEDICINE

## 2022-04-12 PROCEDURE — 36415 COLL VENOUS BLD VENIPUNCTURE: CPT | Performed by: FAMILY MEDICINE

## 2022-04-12 RX ORDER — IBUPROFEN 800 MG/1
800 TABLET, FILM COATED ORAL EVERY 6 HOURS PRN
Qty: 30 TABLET | Refills: 1 | Status: SHIPPED | OUTPATIENT
Start: 2022-04-12 | End: 2023-05-12

## 2022-04-12 RX ORDER — ACETAMINOPHEN 325 MG/1
650 TABLET ORAL EVERY 4 HOURS PRN
COMMUNITY
Start: 2022-04-12 | End: 2023-05-12

## 2022-04-12 RX ORDER — DOCUSATE SODIUM 100 MG/1
100 CAPSULE, LIQUID FILLED ORAL DAILY
COMMUNITY
Start: 2022-04-13 | End: 2023-05-12

## 2022-04-12 RX ADMIN — IBUPROFEN 800 MG: 800 TABLET ORAL at 04:27

## 2022-04-12 RX ADMIN — DOCUSATE SODIUM 100 MG: 100 CAPSULE, LIQUID FILLED ORAL at 10:44

## 2022-04-12 RX ADMIN — IBUPROFEN 800 MG: 800 TABLET ORAL at 18:13

## 2022-04-12 RX ADMIN — Medication: at 23:53

## 2022-04-12 RX ADMIN — IBUPROFEN 800 MG: 800 TABLET ORAL at 23:53

## 2022-04-12 RX ADMIN — IBUPROFEN 800 MG: 800 TABLET ORAL at 10:47

## 2022-04-12 NOTE — PROGRESS NOTES
"Vaginal Delivery Postpartum Day 1    Patient Name:  Allie Ramos  :      1990  MRN:      [id number]    Assessment:  Normal postpartum course.    Plan:    - continue current care.  - anticipate home later tonight pending baby's bilirubin vs tomorrow  - f/u in 6 weeks with PCP    Subjective:  The patient feels well:  Voiding without difficulty, lochia normal, tolerating normal diet, and passing flatus.  Pain is well controlled with current medications.  The patient has no emotional concerns.  The baby is well. Currently fed by breastfeeding    Objective:  /73 (BP Location: Right arm)   Pulse 76   Temp 98.2  F (36.8  C) (Oral)   Resp 16   Ht 1.702 m (5' 7\")   Wt 67.1 kg (148 lb)   LMP 2021   SpO2 97%   Breastfeeding No   BMI 23.18 kg/m    General: in no acute distress  Abdomen: soft, NT, fundus below umbilicus and firm  : The amount and color of the lochia is appropriate for the duration of recovery.    Ext: no edema  Skin: good color  : perineum healing; no significant swelling nor hematoma  Urinary output is adequate.    Patient Vitals for the past 24 hrs:   BP Temp Temp src Pulse Resp SpO2 Height Weight   22 0802 129/73 98.2  F (36.8  C) Oral 76 16 -- -- --   227 110/78 98.4  F (36.9  C) Oral -- 16 97 % -- --   220 113/62 97.4  F (36.3  C) Oral -- 16 98 % -- --   22 126/80 -- -- -- -- -- -- --   22 121/85 97.7  F (36.5  C) Oral -- 16 -- -- --   22 126/86 -- -- -- -- -- -- --   22 117/80 -- -- -- -- -- -- --   22 121/ -- -- -- -- -- -- --   22 121/ -- -- -- -- -- -- --   22 120/70 -- -- -- -- -- -- --   22 114/57 -- -- -- -- -- -- --   22 -- -- -- -- -- 100 % -- --   22 -- -- -- -- -- 100 % -- --   22 -- 97.5  F (36.4  C) Oral -- -- -- -- --   22 -- -- -- -- -- 100 % -- --   22 -- -- -- -- -- 100 % -- -- "   04/11/22 1758 -- -- -- -- -- 100 % -- --   04/11/22 1753 -- -- -- -- -- 100 % -- --   04/11/22 1748 -- -- -- -- -- 100 % -- --   04/11/22 1745 -- -- -- -- -- 90 % -- --   04/11/22 1743 -- -- -- -- -- 100 % -- --   04/11/22 1740 115/78 -- -- -- -- 91 % -- --   04/11/22 1738 -- -- -- -- -- 100 % -- --   04/11/22 1733 -- -- -- -- -- 100 % -- --   04/11/22 1728 -- -- -- -- -- 100 % -- --   04/11/22 1723 -- 97.5  F (36.4  C) Oral -- -- 100 % -- --   04/11/22 1718 -- -- -- -- -- 100 % -- --   04/11/22 1714 -- -- -- -- -- 93 % -- --   04/11/22 1713 -- -- -- -- -- 100 % -- --   04/11/22 1710 107/73 -- -- -- -- -- -- --   04/11/22 1708 110/73 -- -- -- -- 100 % -- --   04/11/22 1706 114/72 -- -- -- -- -- -- --   04/11/22 1704 107/69 -- -- -- -- -- -- --   04/11/22 1703 -- -- -- -- -- 100 % -- --   04/11/22 1702 112/72 -- -- -- -- -- -- --   04/11/22 1700 107/69 -- -- -- -- -- -- --   04/11/22 1658 111/73 -- -- -- -- 98 % -- --   04/11/22 1656 111/77 -- -- -- -- -- -- --   04/11/22 1654 108/74 -- -- -- -- -- -- --   04/11/22 1653 -- -- -- -- -- 100 % -- --   04/11/22 1652 111/74 -- -- -- -- -- -- --   04/11/22 1650 107/68 -- -- -- -- -- -- --   04/11/22 1648 103/62 -- -- -- -- 100 % -- --   04/11/22 1646 98/58 -- -- -- -- -- -- --   04/11/22 1644 107/62 -- -- -- -- -- -- --   04/11/22 1643 -- -- -- -- -- 100 % -- --   04/11/22 1638 -- -- -- -- -- 100 % -- --   04/11/22 1634 125/80 -- -- -- -- -- -- --   04/11/22 1633 -- -- -- -- -- (!) 86 % -- --   04/11/22 1628 -- -- -- -- -- (!) 89 % -- --   04/11/22 1623 -- -- -- -- -- 100 % -- --   04/11/22 1619 102/57 97.7  F (36.5  C) Oral -- -- -- -- --   04/11/22 1618 -- -- -- -- -- 100 % -- --   04/11/22 1615 100/55 -- -- -- -- -- -- --   04/11/22 1613 98/61 -- -- -- -- 100 % -- --   04/11/22 1611 100/62 -- -- -- -- -- -- --   04/11/22 1609 96/59 -- -- -- -- -- -- --   04/11/22 1608 -- -- -- -- -- 100 % -- --   04/11/22 1607 100/60 -- -- -- -- -- -- --   04/11/22 1605 97/61 -- -- --  "-- -- -- --   22 1604 93/59 -- -- -- -- -- -- --   22 1603 -- -- -- -- -- 100 % -- --   22 1601 109/65 -- -- -- -- -- -- --   22 1559 105/68 -- -- -- -- -- -- --   22 1558 -- -- -- -- -- 100 % -- --   22 1557 105/65 -- -- -- -- -- -- --   22 1516 -- 97.8  F (36.6  C) Oral -- -- -- -- --   22 1412 118/87 -- -- -- -- -- -- --   22 1348 (!) 140/89 97.7  F (36.5  C) Oral 100 18 -- 1.702 m (5' 7\") 67.1 kg (148 lb)       Immunization History   Administered Date(s) Administered     COVID-19,PF,Moderna 2021, 2021, 10/29/2021     HPV Quadrivalent 10/19/2010, 2011     Influenza Vaccine IM > 6 months Valent IIV4 (Alfuria,Fluzone) 10/09/2018, 10/08/2019, 10/31/2020, 10/01/2021     Influenza Vaccine, 6+MO IM (QUADRIVALENT W/PRESERVATIVES) 09/15/2016, 2017     TDAP Vaccine (Adacel) 2019, 2020     Tdap (Adacel,Boostrix) 2022       Provider:  Sandra Nam MD  Date:  2022  10:52 AM        Patient Name:  Allie Ramos  :  1990  MRN:  [id number]    "

## 2022-04-12 NOTE — L&D DELIVERY NOTE
OB Vaginal Delivery Note    Allie Ramos MRN# [id number]   Age: 31 year old YOB: 1990       GA: 37w2d  GP:   Labor Complications: None   EBL:   mL  Delivery QBL: 290 mL  Delivery Type: Vaginal, Spontaneous   ROM to Delivery Time: rupture date or rupture time have not been documented  [place] Weight: 3.714 kg (8 lb 3 oz)    1 Minute 5 Minute 10 Minute   Apgar Totals: 8   9        MAGDA MARSHALL;HOLLIS VILLAFANA;GAMALIEL GOYAL;SEDA WOODS;LEONA GROSS;GIBRAN HATCH     Delivery Details:  Allie Ramos, a 31 year old  female delivered a viable infant with apgars of 8  and 9 . Patient was fully dilated and pushing after 4  hours 40  minutes in active labor. Delivery was via vaginal, spontaneous  to a sterile field under epidural  anesthesia. Infant delivered in vertex  left  occiput  anterior  position. Anterior and posterior shoulders delivered without difficulty. The cord was clamped, cut twice and 3 vessels  were noted. Cord blood was obtained in routine fashion with the following disposition: discard .      Cord complications: none   Placenta delivered at 2022  6:35 PM . Placental disposition was Hospital disposal . Fundal massage performed and fundus found to be firm.     Episiotomy: none    Perineum, vagina, cervix were inspected, and the following lacerations were noted:   Perineal lacerations: 1st                Any lacerations were repaired in the usual fashion using 3-0 vicryl rapide.    Excellent hemostasis was noted. Needle count correct. Infant and patient in delivery room in good and stable condition.        Richard, Female-Allie [[id number]]    Labor Event Times    Labor onset date: 22 Onset time:  1:30 PM   Dilation complete date: 22 Complete time:  6:10 PM   Start pushing date/time: 2022 1820      Labor Length    1st Stage (hrs): 4 (min): 40   2nd Stage (hrs): 0 (min): 18   3rd Stage (hrs): 0 (min): 7      Labor Events      labor?: No   steroids: None  Labor Type: Spontaneous  Predominate monitoring during 1st stage: continuous electronic fetal monitoring     Antibiotics received during labor?: No     Rupture identifier: Sac 1  Rupture date/time:     Rupture type: Spontaneous rupture of membranes occuring during spontaneous labor or augmentation  Fluid color: Clear  Fluid odor: Normal     1:1 continuous labor support provided by?: RN Labor partogram used?: no      Delivery/Placenta Date and Time    Delivery Date: 22 Delivery Time:  6:28 PM   Placenta Date/Time: 2022  6:35 PM  Oxytocin given at the time of delivery: after delivery of baby  Delivering clinician: Renay Byrd MD   Other personnel present at delivery:  Provider Role   Kamla Hernandez, KAREN Delivery Nurse   Nikole Wolfe, RN Delivery Nurse   Renay Byrd MD Selb, Tish CABRAL, KAREN Tinajero, Sandra READ, KAREN Morris, Renetta Vasquez, KARO CNP          Vaginal Counts          Needles Suture Needles Sponges (RETIRED) Instruments   Initial counts 1 1 5    Added to count       Relief counts       Final counts             Placed during labor Accounted for at the end of labor   FSE No    IUPC No    Cervidil No          Relief count performed by 2 team members:  Two Team Members   Nikole Wolfe RN        Final count performed by 2 team members:  Two Team Members   Renay Byrd MD, Natalie RN       Final count correct?: Yes     Apgars    Living status: Living   1 Minute 5 Minute 10 Minute 15 Minute 20 Minute   Skin color: 1  1       Heart rate: 2  2       Reflex irritability: 2  2       Muscle tone: 1  2       Respiratory effort: 2  2       Total: 8  9       Apgars assigned by: JUNIE HUDDLESTON     Cord    Vessels: 3 Vessels    Cord Complications: None               Cord Blood Disposition: Discard    Gases Sent?: No    Delayed cord clamping?: Yes    Cord Clamping Delay (seconds): 31-60 seconds    Stem cell collection?: No      "   Resuscitation    Methods: None  [facility] at Delivery: Requested by Dr. Byrd to attend the delivery of this term, female infant with a gestational age of 37 2/7 weeks secondary to decreased fetal heart tones.      Infant delivered at 1828 hours on 2022. The infant was stimulated, cried and had spontaneous respirations during delayed cord clamping. The infant was placed on a warmer, dried and stimulated. Infant required no further resuscitation. Apgars were 8 at one minute of age. Gross physical exam is WNL. Infant was shown to mother and father, handoff given to nursery nurse, and will be transferred to the  nursery for further care.    This resuscitation and all procedures were performed by this author.    Renetta HUDDLESTON, CNP 2022 6:38 PM   Advanced Practice Providers  [facility]    Output in Delivery Room: Voided      Measurements    Weight: 8 lb 3 oz Length: 1' 9\"   Head circumference: 34.3 cm    Output in delivery room: Voided     Skin to Skin and Feeding Plan    Skin to skin initiation date/time: 1841    Skin to skin with: Mother  Skin to skin end date/time:        Labor Events and Shoulder Dystocia    Fetal Tracing Prior to Delivery: Category 2  Fetal Tracing Comments: terminal bradycardia with cord compression; cord was over the baby's shoulder.  Shoulder dystocia present?: Neg     Delivery (Maternal) (Provider to Complete) ([id number])    Episiotomy: None  Perineal lacerations: 1st    Repair suture: 3-0 Rapide  Number of repair packets: 1  Genital tract inspection done: Pos     Blood Loss  Mother: Allie Ramos #[id number]   Start of Mother's Information    Delivery Blood Loss  22 1330 - 22 1110    Delivery QBL (mL) Hospital Encounter 290 mL    Postpartum QBL (mL) Hospital Encounter 27 mL    Total  317 mL         End of Mother's Information  Mother: Allie Ramos #[id number]          Delivery - " Provider to Complete ([id number])    Delivering clinician: Renay Byrd MD  Delivery Type (Choose the 1 that will go to the Birth History): Vaginal, Spontaneous                   Other personnel:  Provider Role   Kamla Hernandez, KAREN Delivery Nurse   Nikole Wolfe, RN Delivery Nurse   Renay Byrd MD Selb, Lucia E, Sandra Lynch, KAREN Morris, KARO Thompson CNP                 Placenta    Date/Time: 4/11/2022  6:35 PM  Removal: Spontaneous  Comments: cord was over the baby's shoulder  Disposition: Hospital disposal           Anesthesia    Method: Epidural  Cervical dilation at placement: 4-7                Presentation and Position    Presentation: Vertex    Position: Left Occiput Anterior                 Renay Byrd MD

## 2022-04-12 NOTE — LACTATION NOTE
This note was copied from a baby's chart.  Referred to Allie to assist with a feeding. Allie reported that her nipples were starting to get sore and Mother Love cream was given to her to use.A lach was briefly observed on the L in a cross cradle hold. Positioning to make feedings more comfortable was reviewed.  Allie has 24mm flanges at home for her Medela pump. Sizing options were discussed and 27mm flanges were also offered for increased comfort.

## 2022-04-12 NOTE — PLAN OF CARE
Problem: Plan of Care - These are the overarching goals to be used throughout the patient stay.    Goal: Plan of Care Review/Shift Note  Outcome: Ongoing, Progressing  Goal: Patient-Specific Goal (Individualized)  Outcome: Ongoing, Progressing  Goal: Absence of Hospital-Acquired Illness or Injury  Outcome: Ongoing, Progressing    Goal: Optimal Comfort and Wellbeing  Outcome: Ongoing, Progressing    Goal: Readiness for Transition of Care  Outcome: Ongoing, Progressing     Problem: Adjustment to Role Transition (Postpartum Vaginal Delivery)  Goal: Successful Maternal Role Transition  Outcome: Ongoing, Progressing     Problem: Bleeding (Postpartum Vaginal Delivery)  Goal: Hemostasis  Outcome: Ongoing, Progressing     Problem: Infection (Postpartum Vaginal Delivery)  Goal: Absence of Infection Signs/Symptoms  Outcome: Ongoing, Progressing     Problem: Pain (Postpartum Vaginal Delivery)  Goal: Acceptable Pain Control  Outcome: Ongoing, Progressing     Problem: Urinary Retention (Postpartum Vaginal Delivery)  Goal: Effective Urinary Elimination  Outcome: Ongoing, Progressing     Vitally stable. Minimal pain controlled with ibuprofen. Minimal bleeding. Fundus firm. Voiding spontaneously. Breastfeeding baby.

## 2022-04-12 NOTE — ANESTHESIA POSTPROCEDURE EVALUATION
Patient: Allie Ramos    Procedure: * No procedures listed *       Anesthesia Type:  Epidural    Note:     Postop Pain Control: Uneventful            Sign Out: Well controlled pain   PONV: No   Neuro/Psych: Uneventful            Sign Out: Acceptable/Baseline neuro status   Airway/Respiratory: Uneventful            Sign Out: Acceptable/Baseline resp. status   CV/Hemodynamics: Uneventful            Sign Out: Acceptable CV status   Other NRE: NONE   DID A NON-ROUTINE EVENT OCCUR? No           Last vitals:  Vitals Value Taken Time   /85 04/11/22 2011   Temp     Pulse     Resp     SpO2     Vitals shown include unvalidated device data.    Electronically Signed By: Ck Weldon MD  April 11, 2022  8:13 PM

## 2022-04-13 VITALS
BODY MASS INDEX: 23.23 KG/M2 | HEIGHT: 67 IN | RESPIRATION RATE: 16 BRPM | DIASTOLIC BLOOD PRESSURE: 77 MMHG | HEART RATE: 76 BPM | SYSTOLIC BLOOD PRESSURE: 121 MMHG | TEMPERATURE: 98 F | OXYGEN SATURATION: 99 % | WEIGHT: 148 LBS

## 2022-04-13 PROCEDURE — 99207 PR NO CHARGE LOS: CPT | Performed by: FAMILY MEDICINE

## 2022-04-13 PROCEDURE — 250N000013 HC RX MED GY IP 250 OP 250 PS 637: Performed by: FAMILY MEDICINE

## 2022-04-13 RX ADMIN — DOCUSATE SODIUM 100 MG: 100 CAPSULE, LIQUID FILLED ORAL at 08:51

## 2022-04-13 RX ADMIN — IBUPROFEN 800 MG: 800 TABLET ORAL at 08:51

## 2022-04-13 NOTE — PLAN OF CARE
Problem: Plan of Care - These are the overarching goals to be used throughout the patient stay.    Goal: Plan of Care Review/Shift Note  Description: The Plan of Care Review/Shift note should be completed every shift.  The Outcome Evaluation is a brief statement about your assessment that the patient is improving, declining, or no change.  This information will be displayed automatically on your shift note.  Outcome: Ongoing, Progressing  Flowsheets (Taken 4/12/2022 1923)  Plan of Care Reviewed With:   patient   spouse  Outcome Evaluation: A&O X4, VSS. Voiding without complication. Reports ibuprofen is effective in managing pain. Expresses desire to discharge tonight.  Overall Patient Progress: improving

## 2022-04-13 NOTE — PLAN OF CARE
Problem: Plan of Care - These are the overarching goals to be used throughout the patient stay.      Outcome: Ongoing, Progressing    Outcome: Ongoing, Progressing  Goal: Absence of Hospital-Acquired Illness or Injury  Outcome: Ongoing, Progressing    Goal: Optimal Comfort and Wellbeing  Outcome: Ongoing, Progressing  Goal: Readiness for Transition of Care  Outcome: Ongoing, Progressing     Problem: Adjustment to Role Transition (Postpartum Vaginal Delivery)  Goal: Successful Maternal Role Transition  Outcome: Ongoing, Progressing     Problem: Bleeding (Postpartum Vaginal Delivery)  Goal: Hemostasis  Outcome: Ongoing, Progressing     Problem: Infection (Postpartum Vaginal Delivery)  Goal: Absence of Infection Signs/Symptoms  Outcome: Ongoing, Progressing     Problem: Pain (Postpartum Vaginal Delivery)  Goal: Acceptable Pain Control  Outcome: Ongoing, Progressing     Problem: Urinary Retention (Postpartum Vaginal Delivery)  Goal: Effective Urinary Elimination  Outcome: Ongoing, Progressing

## 2022-04-13 NOTE — DISCHARGE SUMMARY
[facility] Discharge Summary    Allie Ramos MRN# [id number]   Age: 31 year old YOB: 1990     Date of Admission:  2022  Date of Discharge::  2022  Admitting Physician:  Renay Byrd MD  Discharge Physician:  Renay Byrd MD     Home clinic: [facility]          Admission Diagnoses:   Encounter for triage in pregnant patient [Z36.89]  Pregnant [Z34.90]  labor          Discharge Diagnosis:   Normal spontaneous vaginal delivery  Intrauterine pregnancy at 37w2d weeks gestation          Procedures:   Procedure(s): No additional procedures performed       No procedures performed during this admission           Medications Prior to Admission:     Medications Prior to Admission   Medication Sig Dispense Refill Last Dose     levothyroxine (SYNTHROID/LEVOTHROID) 50 MCG tablet Take 1 tablet (50 mcg) by mouth daily 90 tablet 4      Prenatal Vit-Fe Fumarate-FA (PRENATAL MULTIVITAMIN W/IRON) 27-0.8 MG tablet Take 1 tablet by mouth daily                Discharge Medications:     Current Discharge Medication List      START taking these medications    Details   acetaminophen (TYLENOL) 325 MG tablet Take 2 tablets (650 mg) by mouth every 4 hours as needed for mild pain or fever (greater than or equal to 38  C /100.4  F (oral) or 38.5  C/ 101.4  F (core).)    Associated Diagnoses:  (normal spontaneous vaginal delivery)      docusate sodium (COLACE) 100 MG capsule Take 1 capsule (100 mg) by mouth daily    Associated Diagnoses:  (normal spontaneous vaginal delivery)      ibuprofen (ADVIL/MOTRIN) 800 MG tablet Take 1 tablet (800 mg) by mouth every 6 hours as needed for other (cramping)  Qty: 30 tablet, Refills: 1    Associated Diagnoses:  (normal spontaneous vaginal delivery)         CONTINUE these medications which have NOT CHANGED    Details   levothyroxine (SYNTHROID/LEVOTHROID) 50 MCG tablet Take 1 tablet (50 mcg) by mouth daily  Qty: 90 tablet, Refills:  4    Associated Diagnoses: Subclinical hypothyroidism      Prenatal Vit-Fe Fumarate-FA (PRENATAL MULTIVITAMIN W/IRON) 27-0.8 MG tablet Take 1 tablet by mouth daily                   Consultations:   No consultations were requested during this admission          Brief History of Labor:   Presented with SROM and active labor starting           Hospital Course:   The patient's hospital course was unremarkable.  On discharge, her pain was well controlled.  Vaginal bleeding is similar to peak menstrual flow.  Voiding without difficulty.  Ambulating well and tolerating a normal diet.  No fever.  Breastfeeding going fairly well.  Infant is stable.  No bowel movement yet.*  She was discharged on post-partum day #2 .    Post-partum hemoglobin:   Hemoglobin   Date Value Ref Range Status   04/12/2022 11.3 (L) 11.7 - 15.7 g/dL Final   06/15/2020 12.2 11.7 - 15.7 g/dL Final             Discharge Instructions and Follow-Up:   Discharge diet: Regular   Discharge activity: Activity as tolerated   Discharge follow-up: Follow up with primary care provider in 6 weeks   Wound care: Drink plenty of fluids  Ice to area for comfort           Discharge Disposition:   Discharged to home      Attestation:       Renay Byrd MD

## 2022-04-13 NOTE — PROGRESS NOTES
Outreach Note for [facility]    Allie Ramos  [id number]  1990    Chart reviewed, discharge plan discussed with patient, needs assessed. Patient verbalizes understanding of plan, requests postpartum home care visit, nurse visit planned for Thursday, April 14th, Home Care Intake updated. Address and phone number reported as being correct in EMR.     Patient states she has good support at home, has baby care essentials, and feels ready to discharge today. Outreach RN will continue to follow and assist if needed with discharge plan. No additional needs identified at this time.

## 2022-04-13 NOTE — PLAN OF CARE
Problem: Plan of Care - These are the overarching goals to be used throughout the patient stay.    Goal: Plan of Care Review/Shift Note  Description: The Plan of Care Review/Shift note should be completed every shift.  The Outcome Evaluation is a brief statement about your assessment that the patient is improving, declining, or no change.  This information will be displayed automatically on your shift note.  Outcome: Met     Problem: Plan of Care - These are the overarching goals to be used throughout the patient stay.    Goal: Readiness for Transition of Care  Outcome: Met       Patient ready for discharge. Vitals are stable. Understanding of follow up care. Patients questions are answered. Patient will be brought down to get Bili blanket before going home.     Kamla Hernandez RN

## 2022-04-13 NOTE — DISCHARGE INSTRUCTIONS
After a Vaginal Birth  After having a baby, your body may be very tired. It can take time to recover from a vaginal delivery. You may stay in the hospital or birth center from 1 to 4 days. In some cases, you may be able to go home the same day.     Right after the delivery  Your temperature and blood pressure will be taken until they are stable. A nurse or other healthcare provider will watch you as you rest. You may have afterbirth pains. These are cramps caused by the uterus shrinking. Sanitary pads are used to soak up the discharge of the uterine lining. To make sure that you aren t bleeding too much, the pad will be checked. And the firmness of your uterus will be checked. To do this, a nurse will gently push down on your stomach. If you had anesthesia, you ll be watched closely until you can feel and move your toes. If you have pain between your vagina and anus (perineal pain), an ice pack can help.    care  While still in the hospital or birth center, you ll learn how to hold and feed your baby. You will also be given instructions on how to care for your baby. This includes bathing and feeding.    Getting ready to go home  You may be anxious to go home as soon as possible. Before you and your baby go home, a healthcare provider will check to be sure you are healthy enough to take care of your baby and yourself. You re ready to go home when:   You can walk to the bathroom and use the bathroom without help.  You can eat solid food and swallow pills (if needed).  You have no sign of infection or other health problems, including fever.   You have adequate pain control.  You aren't bleeding isn't excessive.  You are able to care for your  and are emotionally stable.  Before going home, you ll be given written instructions for home self-care after vaginal delivery. Follow these instructions carefully. If you have questions or concerns, talk about them now.   If you have stitches  You may have  received stitches in the skin near your vagina. The stitches might have closed an incision that enlarged the opening of your vagina (episiotomy). Or you may have needed stitches to repair torn skin. Either way, your stitches should dissolve in weeks. Until then, it's important to keep the stitches clean. You can help reduce mild pain, aid healing, and reduce your risk of infection. These tips can help:   Gently wipe from front to back after you urinate or have a bowel movement.  After wiping, spray warm water on the area. Or you can have a sitz bath. This means sitting in a tub with a few inches of water in it. Then pat the area dry or use a hairdryer on a cool setting.  Don't use soap or any solution except water on the area.  You can take a shower unless told not to.  Change sanitary pads at least every 2 to 4 hours.  Place cold or heat packs on the area as directed by your healthcare providers or nurses. Keep a thin towel between the pack and your skin.  Sit on firm seats so the stitches pull less.   follow-up  Schedule a  follow-up exam with your healthcare provider for about 6 weeks after delivery. During this exam, your uterus and vaginal area will be checked. Contact your healthcare provider if you think you or your baby are having any problems.   When to call your healthcare provider  Call your healthcare provider right away if you have:   A fever of 100.4  F ( 38.0 C) or higher, or as directed by your provider  Bleeding that needs a new sanitary pad after an hour, or large blood clots  Pain in your vagina that gets worse and isn't eased with medicine  Swelling, discharge, or more pain from vaginal tear or episiotomy  Burning, pain, red streaks, or lumpy areas in your breasts that may occur with flu-like symptoms  Cracks, blisters, or blood on your nipples  Burning or pain when you urinate  Nausea or vomiting  Dizziness or fainting  Feelings of extreme sadness or anxiety, or a feeling that  you don t want to be with your baby  Belly pain that isn t eased with medicine  Vaginal discharge that has a bad odor  No bowel movement for 5 days  Painful urination, or inability to control urination  Redness, warmth, or pain in the lower leg  Chest pain  [employer] last reviewed this educational content on 8/1/2020 2000-2021 [employer]. All rights reserved. This information is not intended as a substitute for professional medical care. Always follow your healthcare professional's instructions.      Postpartum Vaginal Delivery Instructions    Activity     Ask family and friends for help when you need it.  Do not place anything in your vagina for 6 weeks.  You are not restricted on other activities, but take it easy for a few weeks to allow your body to recover from delivery.  You are able to do any activities you feel up to that point.  No driving until you have stopped taking your pain medications (usually two weeks after delivery).     Call your health care provider if you have any of these symptoms:     Increased pain, swelling, redness, or fluid around your stiches from an episiotomy or perineal tear.  A fever above 100.4 F (38 C) with or without chills when placing a thermometer under your tongue.  You soak a sanitary pad with blood within 1 hour, or you see blood clots larger than a golf ball.  Bleeding that lasts more than 6 weeks.  Vaginal discharge that smells bad.  Severe pain, cramping or tenderness in your lower belly area.  A need to urinate more frequently (use the toilet more often), more urgently (use the toilet very quickly), or it burns when you urinate.  Nausea and vomiting.  Redness, swelling or pain around a vein in your leg.  Problems breastfeeding or a red or painful area on your breast.  Chest pain and cough or are gasping for air.  Problems coping with sadness, anxiety, or depression.  If you have any concerns about hurting yourself or the baby, call your provider  immediately.   You have questions or concerns after you return home.     Keep your hands clean:  Always wash your hands before touching your perineal area and stitches.  This helps reduce your risk of infection.  If your hands aren't dirty, you may use an alcohol hand-rub to clean your hands. Keep your nails clean and short.

## 2022-04-14 ENCOUNTER — MEDICAL CORRESPONDENCE (OUTPATIENT)
Dept: HEALTH INFORMATION MANAGEMENT | Facility: CLINIC | Age: 32
End: 2022-04-14
Payer: COMMERCIAL

## 2022-04-15 ENCOUNTER — MEDICAL CORRESPONDENCE (OUTPATIENT)
Dept: HEALTH INFORMATION MANAGEMENT | Facility: CLINIC | Age: 32
End: 2022-04-15
Payer: COMMERCIAL

## 2022-05-20 ENCOUNTER — MEDICAL CORRESPONDENCE (OUTPATIENT)
Dept: HEALTH INFORMATION MANAGEMENT | Facility: CLINIC | Age: 32
End: 2022-05-20
Payer: COMMERCIAL

## 2022-05-23 ENCOUNTER — PRENATAL OFFICE VISIT (OUTPATIENT)
Dept: FAMILY MEDICINE | Facility: CLINIC | Age: 32
End: 2022-05-23
Payer: COMMERCIAL

## 2022-05-23 VITALS
HEART RATE: 76 BPM | SYSTOLIC BLOOD PRESSURE: 112 MMHG | WEIGHT: 136.9 LBS | HEIGHT: 68 IN | DIASTOLIC BLOOD PRESSURE: 68 MMHG | BODY MASS INDEX: 20.75 KG/M2

## 2022-05-23 DIAGNOSIS — L98.9 SKIN LESION: ICD-10-CM

## 2022-05-23 DIAGNOSIS — Z34.81 PRENATAL CARE, SUBSEQUENT PREGNANCY IN FIRST TRIMESTER: ICD-10-CM

## 2022-05-23 DIAGNOSIS — E03.9 HYPOTHYROIDISM, UNSPECIFIED TYPE: ICD-10-CM

## 2022-05-23 LAB
HGB BLD-MCNC: 13.1 G/DL (ref 11.7–15.7)
TSH SERPL DL<=0.005 MIU/L-ACNC: 0.56 UIU/ML (ref 0.3–5)

## 2022-05-23 PROCEDURE — 84443 ASSAY THYROID STIM HORMONE: CPT | Performed by: FAMILY MEDICINE

## 2022-05-23 PROCEDURE — 99207 PR POST PARTUM EXAM: CPT | Performed by: FAMILY MEDICINE

## 2022-05-23 PROCEDURE — 36415 COLL VENOUS BLD VENIPUNCTURE: CPT | Performed by: FAMILY MEDICINE

## 2022-05-23 NOTE — PROGRESS NOTES
Allie is here for a 6-week postpartum checkup.    She had a  of a viable girl, weight   pounds   oz., with none complications.  Since delivery, she has been breast feeding.  She has no signs of infection, bleeding or other complications.  She is not pregnant.  We discussed contraceptions and she has chosen condoms.      EXAM:    HEENT: grossly normal.  NECK: no lymphadenopathy or thyroidomegaly.  PELVIC:    External genitalia: normal without lesion, repair well healed.                            Vagina: normal mucosa and rugae, no discharge.  Cervix: multiparous, well healed, without lesion.  Uterus: non pregnant in size, firm , mobile, no lesions.  Adnexa: non tender     EXTREMITIES: Warm to touch, good pulses, no ankle edema or calf tenderness.  NEUROLOGIC: grossly normal.    ASSESSMENT:   Normal 6-week postpartum exam after .  History hypothyroidism      PLAN: hemoglobin  TSH

## 2022-06-20 ENCOUNTER — MEDICAL CORRESPONDENCE (OUTPATIENT)
Dept: HEALTH INFORMATION MANAGEMENT | Facility: CLINIC | Age: 32
End: 2022-06-20

## 2022-09-18 ENCOUNTER — HEALTH MAINTENANCE LETTER (OUTPATIENT)
Age: 32
End: 2022-09-18

## 2022-11-03 ENCOUNTER — MEDICAL CORRESPONDENCE (OUTPATIENT)
Dept: HEALTH INFORMATION MANAGEMENT | Facility: CLINIC | Age: 32
End: 2022-11-03

## 2023-01-10 ENCOUNTER — MYC MEDICAL ADVICE (OUTPATIENT)
Dept: FAMILY MEDICINE | Facility: CLINIC | Age: 33
End: 2023-01-10

## 2023-04-18 NOTE — TELEPHONE ENCOUNTER
Please review and advise on patient request for refill on levothyroxine. Lab would be due coming up.    TSH   Date Value Ref Range Status   04/22/2020 0.91 0.40 - 4.00 mU/L Final     Thanks,    Carol Rojas   [facility]  RN    
[facility]

## 2023-05-06 ENCOUNTER — HEALTH MAINTENANCE LETTER (OUTPATIENT)
Age: 33
End: 2023-05-06

## 2023-05-12 ENCOUNTER — OFFICE VISIT (OUTPATIENT)
Dept: FAMILY MEDICINE | Facility: CLINIC | Age: 33
End: 2023-05-12
Payer: COMMERCIAL

## 2023-05-12 VITALS
DIASTOLIC BLOOD PRESSURE: 81 MMHG | OXYGEN SATURATION: 99 % | WEIGHT: 123.5 LBS | HEIGHT: 68 IN | RESPIRATION RATE: 16 BRPM | SYSTOLIC BLOOD PRESSURE: 120 MMHG | BODY MASS INDEX: 18.72 KG/M2 | HEART RATE: 91 BPM

## 2023-05-12 DIAGNOSIS — M26.609 TMJ (TEMPOROMANDIBULAR JOINT SYNDROME): ICD-10-CM

## 2023-05-12 DIAGNOSIS — Z00.00 ROUTINE GENERAL MEDICAL EXAMINATION AT A HEALTH CARE FACILITY: Primary | ICD-10-CM

## 2023-05-12 DIAGNOSIS — B35.1 FUNGAL INFECTION OF TOENAIL: ICD-10-CM

## 2023-05-12 DIAGNOSIS — E03.8 SUBCLINICAL HYPOTHYROIDISM: ICD-10-CM

## 2023-05-12 DIAGNOSIS — L98.9 SKIN LESION: ICD-10-CM

## 2023-05-12 PROBLEM — E03.9 ACQUIRED HYPOTHYROIDISM: Status: ACTIVE | Noted: 2023-05-12

## 2023-05-12 PROBLEM — Z34.90 PREGNANT: Status: RESOLVED | Noted: 2022-04-11 | Resolved: 2023-05-12

## 2023-05-12 LAB — TSH SERPL DL<=0.005 MIU/L-ACNC: 1.33 UIU/ML (ref 0.3–4.2)

## 2023-05-12 PROCEDURE — 36415 COLL VENOUS BLD VENIPUNCTURE: CPT

## 2023-05-12 PROCEDURE — 99395 PREV VISIT EST AGE 18-39: CPT

## 2023-05-12 PROCEDURE — 99213 OFFICE O/P EST LOW 20 MIN: CPT | Mod: 25

## 2023-05-12 PROCEDURE — 84443 ASSAY THYROID STIM HORMONE: CPT

## 2023-05-12 RX ORDER — LEVOTHYROXINE SODIUM 50 UG/1
50 TABLET ORAL DAILY
Qty: 90 TABLET | Refills: 3 | Status: SHIPPED | OUTPATIENT
Start: 2023-05-12

## 2023-05-12 RX ORDER — CICLOPIROX 80 MG/ML
SOLUTION TOPICAL
Qty: 6.6 ML | Refills: 11 | Status: SHIPPED | OUTPATIENT
Start: 2023-05-12

## 2023-05-12 ASSESSMENT — ENCOUNTER SYMPTOMS
BREAST MASS: 0
NERVOUS/ANXIOUS: 0
HEARTBURN: 0
FEVER: 0
EYE PAIN: 0
NAUSEA: 0
ABDOMINAL PAIN: 0
SORE THROAT: 0
HEADACHES: 0
SHORTNESS OF BREATH: 0
ARTHRALGIAS: 0
COUGH: 0
DIARRHEA: 0
HEMATURIA: 0
PARESTHESIAS: 0
CONSTIPATION: 0
WEAKNESS: 0
JOINT SWELLING: 0
HEMATOCHEZIA: 0
PALPITATIONS: 0
FREQUENCY: 0
CHILLS: 0
DIZZINESS: 0
MYALGIAS: 0
DYSURIA: 0

## 2023-05-12 NOTE — ASSESSMENT & PLAN NOTE
Saw dentist yesterday and reports that she did receive a referral, however she is unsure if for insurance purposes she would need a referral from the family practice setting. I encouraged her to check with insurance and then I can place a referral if needed.

## 2023-05-12 NOTE — ASSESSMENT & PLAN NOTE
Annual exam.  Patient is up-to-date on all preventative medicine recommendations.  Due for updated Pap next year.  BMI is 18; endorses excellent dietary and exercise habits.  Supplements with vitamin D and has good calcium intake. Follow up in one year or sooner with acute concerns.

## 2023-05-12 NOTE — ASSESSMENT & PLAN NOTE
Has tried over-the-counter remedies for a couple weeks with no improvement.  Involves bilateral hallux nails.  We discussed that the treatment of this infection is done primarily for cosmetic purposes.  We also discussed the fact that topical therapies alone would likely not be sufficient for complete clearance of the infection.  We will trial ciclopirox weekly for 3 to 4 months.  If not having improvement, we briefly discussed the principles of oral antifungal treatment, and that it would necessitate regular lab monitoring.  We will follow-up as needed.

## 2023-05-12 NOTE — ASSESSMENT & PLAN NOTE
On right outer labia.  Not concerning in appearance.  Recommend continue to monitor for changes.  We reviewed characteristics of skin lesions that are concerning for cancerous processes and patient will follow-up as needed.

## 2023-05-12 NOTE — ASSESSMENT & PLAN NOTE
Patient reports that this was discovered incidentally in 2020 as she was trying to achieve pregnancy.  She was asymptomatic at that time and currently denies any signs of hypo or hyperthyroidism.  Has been maintained on levothyroxine 50 mcg daily since then.  Updated labs drawn today and refill was sent to her pharmacy.  We will reach out if her dose is in need of adjustment to based on her labs done today.

## 2023-05-12 NOTE — PROGRESS NOTES
"   SUBJECTIVE:   CC: Allie is an 32 year old who presents for preventive health visit.       5/12/2023     7:45 AM   Additional Questions   Roomed by ac   Accompanied by self         5/12/2023     7:45 AM   Patient Reported Additional Medications   Patient reports taking the following new medications n/a   Patient has been advised of split billing requirements and indicates understanding: Yes     -Noticed new labial freckle    Healthy Habits:     Getting at least 3 servings of Calcium per day:  Yes    Bi-annual eye exam:  Yes    Dental care twice a year:  Yes    Sleep apnea or symptoms of sleep apnea:  None    Diet:  Regular (no restrictions)    Frequency of exercise:  6-7 days/week    Duration of exercise:  45-60 minutes    Taking medications regularly:  Yes    Medication side effects:  None    PHQ-2 Total Score: 0    Additional concerns today:  Yes    Wide variety of diet and exercise.   \"Macro friendly\" in terms of diets. Vegetables, proteins.    Today's PHQ-2 Score:       5/12/2023     6:28 AM   PHQ-2 ( 1999 [employer])   Q1: Little interest or pleasure in doing things 0   Q2: Feeling down, depressed or hopeless 0   PHQ-2 Score 0   Q1: Little interest or pleasure in doing things Not at all    Not at all   Q2: Feeling down, depressed or hopeless Not at all    Not at all   PHQ-2 Score 0    0     Have you ever done Advance Care Planning? (For example, a Health Directive, POLST, or a discussion with a medical provider or your loved ones about your wishes): No, advance care planning information given to patient to review.  Patient plans to discuss their wishes with loved ones or provider.      Social History     Tobacco Use     Smoking status: Never     Smokeless tobacco: Never   Vaping Use     Vaping status: Never Used   Substance Use Topics     Alcohol use: Not Currently     Comment: occas-quit with pregnancy           5/12/2023     6:28 AM   Alcohol Use   Prescreen: >3 drinks/day or >7 drinks/week? No     Reviewed " orders with patient.  Reviewed health maintenance and updated orders accordingly - Yes  Lab work is in process  Labs reviewed in EPIC  BP Readings from Last 3 Encounters:   23 120/81   22 112/68   22 121/77    Wt Readings from Last 3 Encounters:   23 56 kg (123 lb 8 oz)   22 62.1 kg (136 lb 14.4 oz)   22 67.1 kg (148 lb)                  Patient Active Problem List   Diagnosis     Subclinical hypothyroidism     Routine general medical examination at a health care facility     Fungal infection of toenail     Skin lesion     TMJ (temporomandibular joint syndrome)     Past Surgical History:   Procedure Laterality Date     EYE SURGERY      left eye     MOUTH SURGERY      wisdom teeth     TONSILLECTOMY & ADENOIDECTOMY      age 8       Social History     Tobacco Use     Smoking status: Never     Smokeless tobacco: Never   Vaping Use     Vaping status: Never Used   Substance Use Topics     Alcohol use: Not Currently     Comment: occas-quit with pregnancy     Family History   Problem Relation Age of Onset     Diabetes Maternal Grandmother      Heart Surgery Maternal Grandmother      Other - See Comments Maternal Grandfather         CJD ?     Breast Cancer No family hx of      Colon Cancer No family hx of      Thyroid Disease No family hx of      Coronary Artery Disease Early Onset No family hx of          Current Outpatient Medications   Medication Sig Dispense Refill     ciclopirox (PENLAC) 8 % external solution Apply to adjacent skin and affected nails daily.  Remove with alcohol every 7 days, then repeat. 6.6 mL 11     levothyroxine (SYNTHROID/LEVOTHROID) 50 MCG tablet Take 1 tablet (50 mcg) by mouth daily 90 tablet 3     No Known Allergies    Breast Cancer Screenin/12/2023     6:31 AM   Breast CA Risk Assessment (FHS-7)   Do you have a family history of breast, colon, or ovarian cancer? No / Unknown       click delete button to remove this line now  Patient under 40 years  of age: Routine Mammogram Screening not recommended.   Pertinent mammograms are reviewed under the imaging tab.    History of abnormal Pap smear: NO - age 30-65 PAP every 5 years with negative HPV co-testing recommended  Last 3 Pap and HPV Results:       Latest Ref Rng & Units 10/1/2021     1:09 PM 12/14/2018     3:00 PM 9/15/2016    10:01 AM   PAP / HPV   PAP  Negative for Intraepithelial Lesion or Malignancy (NILM)    Atypical squamous cells of undetermined significance  Electronically signed by Nilesh Hughes MD PhD on 9/28/2016 at  9:15 AM       PAP (Historical)   NIL      HPV 16 DNA Negative Negative       HPV 18 DNA Negative Negative       Other HR HPV Negative Negative             Latest Ref Rng & Units 10/1/2021     1:09 PM 12/14/2018     3:00 PM 9/15/2016    10:01 AM   PAP / HPV   PAP  Negative for Intraepithelial Lesion or Malignancy (NILM)    Atypical squamous cells of undetermined significance  Electronically signed by Nilesh Hughes MD PhD on 9/28/2016 at  9:15 AM       PAP (Historical)   NIL      HPV 16 DNA Negative Negative       HPV 18 DNA Negative Negative       Other HR HPV Negative Negative         Reviewed and updated as needed this visit by clinical staff   Tobacco  Allergies  Meds     Fam Hx          Reviewed and updated as needed this visit by Provider         [facility] Hx         Review of Systems   Constitutional: Negative for chills and fever.   HENT: Negative for congestion, ear pain, hearing loss and sore throat.    Eyes: Negative for pain and visual disturbance.   Respiratory: Negative for cough and shortness of breath.    Cardiovascular: Negative for chest pain, palpitations and peripheral edema.   Gastrointestinal: Negative for abdominal pain, constipation, diarrhea, heartburn, hematochezia and nausea.   Breasts:  Negative for tenderness, breast mass and discharge.   Genitourinary: Negative for dysuria, frequency, genital sores, hematuria, pelvic pain, urgency, vaginal bleeding and  "vaginal discharge.   Musculoskeletal: Negative for arthralgias, joint swelling and myalgias.   Skin: Negative for rash.   Neurological: Negative for dizziness, weakness, headaches and paresthesias.   Psychiatric/Behavioral: Negative for mood changes. The patient is not nervous/anxious.       OBJECTIVE:   /81 (BP Location: Left arm, Patient Position: Sitting, Cuff Size: Adult Regular)   Pulse 91   Resp 16   Ht 1.727 m (5' 8\")   Wt 56 kg (123 lb 8 oz)   LMP 05/04/2023   SpO2 99%   BMI 18.78 kg/m       Physical Exam  GENERAL: healthy, alert and no distress  EYES: Eyes grossly normal to inspection, PERRL and conjunctivae and sclerae normal  HENT: ear canals and TM's normal, nose and mouth without ulcers or lesions  NECK: no adenopathy, no asymmetry, masses, or scars and thyroid normal to palpation  RESP: lungs clear to auscultation - no rales, rhonchi or wheezes  BREAST: normal without masses, tenderness or nipple discharge and no palpable axillary masses or adenopathy  CV: regular rate and rhythm, normal S1 S2, no S3 or S4, no murmur, click or rub, no peripheral edema and peripheral pulses strong  ABDOMEN: soft, nontender, no hepatosplenomegaly, no masses and bowel sounds normal   (female): pinpoint brown flat macule on right outer labia. Non-tender. Symmetrical and consistent in color.  MS: no gross musculoskeletal defects noted, no edema  SKIN: no suspicious lesions or rashes  NEURO: Normal strength and tone, mentation intact and speech normal  PSYCH: mentation appears normal, affect normal/bright    ASSESSMENT/PLAN:     Problem List Items Addressed This Visit        Endocrine    Subclinical hypothyroidism     Patient reports that this was discovered incidentally in 2020 as she was trying to achieve pregnancy.  She was asymptomatic at that time and currently denies any signs of hypo or hyperthyroidism.  Has been maintained on levothyroxine 50 mcg daily since then.  Updated labs drawn today and refill " was sent to her pharmacy.  We will reach out if her dose is in need of adjustment to based on her labs done today.         Relevant Medications    levothyroxine (SYNTHROID/LEVOTHROID) 50 MCG tablet    Other Relevant Orders    TSH WITH FREE T4 REFLEX       Musculoskeletal and Integumentary    Fungal infection of toenail     Has tried over-the-counter remedies for a couple weeks with no improvement.  Involves bilateral hallux nails.  We discussed that the treatment of this infection is done primarily for cosmetic purposes.  We also discussed the fact that topical therapies alone would likely not be sufficient for complete clearance of the infection.  We will trial ciclopirox weekly for 3 to 4 months.  If not having improvement, we briefly discussed the principles of oral antifungal treatment, and that it would necessitate regular lab monitoring.  We will follow-up as needed.         Relevant Medications    ciclopirox (PENLAC) 8 % external solution    Skin lesion     On right outer labia.  Not concerning in appearance.  Recommend continue to monitor for changes.  We reviewed characteristics of skin lesions that are concerning for cancerous processes and patient will follow-up as needed.         Relevant Medications    ciclopirox (PENLAC) 8 % external solution    TMJ (temporomandibular joint syndrome)     Saw dentist yesterday and reports that she did receive a referral, however she is unsure if for insurance purposes she would need a referral from the family practice setting. I encouraged her to check with insurance and then I can place a referral if needed.            Other    Routine general medical examination at a health care facility - Primary     Annual exam.  Patient is up-to-date on all preventative medicine recommendations.  Due for updated Pap next year.  BMI is 18; endorses excellent dietary and exercise habits.  Supplements with vitamin D and has good calcium intake. Follow up in one year or sooner with  acute concerns.          COUNSELING:  Reviewed preventive health counseling, as reflected in patient instructions       Regular exercise       Healthy diet/nutrition       Osteoporosis prevention/bone health    She reports that she has never smoked. She has never used smokeless tobacco.    KARO Dominguez CNP  [facility]

## 2023-07-27 ENCOUNTER — OFFICE VISIT (OUTPATIENT)
Dept: FAMILY MEDICINE | Facility: CLINIC | Age: 33
End: 2023-07-27
Payer: COMMERCIAL

## 2023-07-27 VITALS
OXYGEN SATURATION: 98 % | HEART RATE: 85 BPM | SYSTOLIC BLOOD PRESSURE: 128 MMHG | WEIGHT: 126.4 LBS | TEMPERATURE: 98 F | RESPIRATION RATE: 16 BRPM | HEIGHT: 68 IN | DIASTOLIC BLOOD PRESSURE: 83 MMHG | BODY MASS INDEX: 19.16 KG/M2

## 2023-07-27 DIAGNOSIS — M54.50 ACUTE BILATERAL LOW BACK PAIN WITHOUT SCIATICA: Primary | ICD-10-CM

## 2023-07-27 PROCEDURE — 99213 OFFICE O/P EST LOW 20 MIN: CPT

## 2023-07-27 RX ORDER — METHOCARBAMOL 500 MG/1
500 TABLET, FILM COATED ORAL 4 TIMES DAILY PRN
Qty: 20 TABLET | Refills: 0 | Status: SHIPPED | OUTPATIENT
Start: 2023-07-27

## 2023-07-27 ASSESSMENT — ENCOUNTER SYMPTOMS: BACK PAIN: 1

## 2023-07-27 NOTE — ASSESSMENT & PLAN NOTE
No red flag symptoms on history or exam.  Most consistent with paraspinal muscle strain based on description of non-radicular pain and its location.  Provocative testing was negative in clinic today.  Discussed engaging in physical therapy for strengthening exercises.  Can trial low-dose muscle relaxer for moderate to severe symptoms.  Expected therapeutic effects and potential side effects were discussed today.  Recommend the use of anti-inflammatories with moderate to severe symptoms as well.  Encouraged return to care with the emergence of any new symptoms or worsening of current symptoms despite this plan of care.  She expressed understanding of and agreement with this plan.  All questions were answered.

## 2023-07-27 NOTE — PROGRESS NOTES
Assessment & Plan   Problem List Items Addressed This Visit          Nervous and Auditory    Acute bilateral low back pain without sciatica - Primary     No red flag symptoms on history or exam.  Most consistent with paraspinal muscle strain based on description of non-radicular pain and its location.  Provocative testing was negative in clinic today.  Discussed engaging in physical therapy for strengthening exercises.  Can trial low-dose muscle relaxer for moderate to severe symptoms.  Expected therapeutic effects and potential side effects were discussed today.  Recommend the use of anti-inflammatories with moderate to severe symptoms as well.  Encouraged return to care with the emergence of any new symptoms or worsening of current symptoms despite this plan of care.  She expressed understanding of and agreement with this plan.  All questions were answered.         Relevant Medications    methocarbamol (ROBAXIN) 500 MG tablet    Other Relevant Orders    Physical Therapy Referral      KARO Dominguez CNP [facility]    Lorena Kelley is a 32 year old, presenting for the following health issues:  Back Pain (Low back bilateral, 7 weeks nki)        7/27/2023     3:19 PM   Additional Questions   Roomed by ac   Accompanied by self       -Onset of symptoms after Memorial Day weekend. No known injury. Initially thought she had slept wrong  -Pain is located in the middle of her lower back. R=L. Does not radiate.  -Maximum severity of 7/10. Average pain is 5/10.   -Aggravating factors include bending and carrying her one year old  -Alleviating factors include stretching. Has tried ibuprofen which does help.  -Denies any weakness, radicular symptoms, numbness or tingling, saddle anesthesia   -No history of back problems or injuries      History of Present Illness       Back Pain:  She presents for follow up of back pain. Patient's back pain is a new problem.    Original cause of back  "pain: not sure  First noticed back pain: more than 1 month ago  Patient feels back pain: dailyLocation of back pain:  Right lower back and left lower back  Description of back pain: dull ache  Back pain spreads: nowhere    Since patient first noticed back pain, pain is: always present, but gets better and worse  Does back pain interfere with her job:  No  On a scale of 1-10 (10 being the worst), patient describes pain as:  5  What makes back pain worse: bending and other   Acupuncture: not tried  Acetaminophen: helpful  Activity or exercise: helpful  Chiropractor:  Not tried  Cold: helpful  Heat: helpful  Massage: not tried  Muscle relaxants: not tried  NSAIDS: helpful  Opioids: not tried  Physical Therapy: not tried  Rest: helpful  Steroid Injection: not tried  Stretching: helpful  Surgery: not tried  TENS unit: not tried  Topical pain relievers: not tried  Other healthcare providers patient is seeing for back pain: None    She eats 4 or more servings of fruits and vegetables daily.She consumes 0 sweetened beverage(s) daily.She exercises with enough effort to increase her heart rate 60 or more minutes per day.  She exercises with enough effort to increase her heart rate 7 days per week.   She is taking medications regularly.     Review of Systems   Musculoskeletal:  Positive for back pain.            Objective    /83 (BP Location: Left arm, Patient Position: Sitting, Cuff Size: Adult Regular)   Pulse 85   Temp 98  F (36.7  C) (Oral)   Resp 16   Ht 1.727 m (5' 8\")   Wt 57.3 kg (126 lb 6.4 oz)   LMP 07/26/2023   SpO2 98%   BMI 19.22 kg/m    Body mass index is 19.22 kg/m .    Physical Exam  Vitals and nursing note reviewed.   Constitutional:       Appearance: Normal appearance.   Cardiovascular:      Rate and Rhythm: Normal rate and regular rhythm.   Pulmonary:      Effort: Pulmonary effort is normal. No respiratory distress.   Musculoskeletal:      Cervical back: Normal.      Thoracic back: Normal.      " Lumbar back: Tenderness (unable to reproduce tenderness with palpation of paraspinal muscles, SI joint, greater trochanter bursa bilaterally) present. No swelling or bony tenderness. Decreased range of motion (pain with forward flexion of waist). Negative right straight leg raise test and negative left straight leg raise test.      Comments: Negative hip thrust bilaterally   Neurological:      Mental Status: She is alert.

## 2023-08-10 ENCOUNTER — THERAPY VISIT (OUTPATIENT)
Dept: PHYSICAL THERAPY | Facility: REHABILITATION | Age: 33
End: 2023-08-10
Payer: COMMERCIAL

## 2023-08-10 DIAGNOSIS — M54.50 ACUTE BILATERAL LOW BACK PAIN WITHOUT SCIATICA: ICD-10-CM

## 2023-08-10 PROCEDURE — 97110 THERAPEUTIC EXERCISES: CPT | Mod: GP | Performed by: PHYSICAL THERAPIST

## 2023-08-10 PROCEDURE — 97161 PT EVAL LOW COMPLEX 20 MIN: CPT | Mod: GP | Performed by: PHYSICAL THERAPIST

## 2023-08-10 NOTE — PROGRESS NOTES
PHYSICAL THERAPY EVALUATION  Type of Visit: Evaluation    See electronic medical record for Abuse and Falls Screening details.    Subjective       Presenting condition or subjective complaint: persistent low back pain  Patient reports with centralized low back pain. Does not have a pattern with which the pain comes. Pain comes and goes, is not consistent but has been ongoing since May. Is a young mother with 2 young kids. Notices the pain when bending over to pick the kids up. Feels that she is always tilting her hips forwards naturally. Does exercise regularly (beach body cindy) and does limit herself with some of the exercises. Has a sit<>stand desk at work.     The patient reports low back pain that does not radiate into the legs.   Date of onset:      Relevant medical history: Pregnant or breastfeeding; Stroke   Dates & types of surgery: 2020 and 2022 babies; febrile seizsure and stroke at 11 months old (1992)    Prior diagnostic imaging/testing results:       Prior therapy history for the same diagnosis, illness or injury: No      Living Environment  Social support: With family members   Type of home: House; 2-story; Basement   Stairs to enter the home: Yes 4 Is there a railing: No   Ramp: No   Stairs inside the home: Yes 20 Is there a railing: Yes   Help at home: None  Equipment owned:       Employment: Yes analyst  Hobbies/Interests: two little kids, home remodel    Patient goals for therapy:      Pain assessment:      Objective     LUMBAR:    Posture: stands and sits in anterior pelvic tilted position     Gait: WNL       Neurological:    Motor Deficit:  Myotomes L R   L1-2 (hip flexion) 5 5   L3 (knee extension) 5 5   L4 (ankle DF) 5 5   L5 (g. toe ext) 5 5   S1 (ankle PF or knee flex) 5 5      Sensory Deficit, Reflexes: WNL     Dural Signs:   L R   Slump - -   SLR - -       AROM: (min, mod, max, + indicates positive pain)  Movement ROM    Flexion P+ initially when flexing forwards, improves with multiple  repetitions. Reaches mid tibia    Extension P+ initially extending, mild improvement with repetition    Side Gliding/Bend L    Side Gliding/Bend R      Core/Gluteal Strength: able to activate lower abdominals, but difficulty maintaining    Lumbar Mobility/Spring Testing: WNL    Palpation: non tender to parapsinals, glutes, QL, spinous process    Other Tests: negative SI joint provocation testing. Negative thigh thrust, neg Anahy/FADIR,     Assessment & Plan   CLINICAL IMPRESSIONS  Medical Diagnosis: Acute bilateral low back pain without sciatica    Treatment Diagnosis:     Impression/Assessment: Patient is a 32 year old female with low back complaints.  The following significant findings have been identified: Pain, Decreased ROM/flexibility, Decreased joint mobility, Decreased strength, and Impaired posture. These impairments interfere with their ability to perform self care tasks, work tasks, recreational activities, household chores, driving , household mobility, and community mobility as compared to previous level of function. The patient will benefit from a stabilization program .     Clinical Decision Making (Complexity):  Clinical Presentation: Stable/Uncomplicated  Clinical Presentation Rationale: based on medical and personal factors listed in PT evaluation  Clinical Decision Making (Complexity): Low complexity    PLAN OF CARE  Treatment Interventions:  Modalities: E-stim, Mechanical Traction  Interventions: Gait Training, Manual Therapy, Neuromuscular Re-education, Therapeutic Activity, Therapeutic Exercise, Self-Care/Home Management    Long Term Goals            Frequency of Treatment: 1x/week  Duration of Treatment: 6 weeks    Recommended Referrals to Other Professionals:   Education Assessment:   Learner/Method: Patient  Education Comments: Eager to participate in therapy    Risks and benefits of evaluation/treatment have been explained.   Patient/Family/caregiver agrees with Plan of Care.     Evaluation  Time:             Signing Clinician: AMBER YUN, PT

## 2023-09-15 ENCOUNTER — OFFICE VISIT (OUTPATIENT)
Dept: FAMILY MEDICINE | Facility: CLINIC | Age: 33
End: 2023-09-15
Payer: COMMERCIAL

## 2023-09-15 VITALS
DIASTOLIC BLOOD PRESSURE: 85 MMHG | TEMPERATURE: 97.7 F | SYSTOLIC BLOOD PRESSURE: 131 MMHG | HEART RATE: 74 BPM | OXYGEN SATURATION: 100 % | RESPIRATION RATE: 14 BRPM

## 2023-09-15 DIAGNOSIS — S91.331A PUNCTURE WOUND OF RIGHT FOOT, INITIAL ENCOUNTER: Primary | ICD-10-CM

## 2023-09-15 DIAGNOSIS — S90.851A FOREIGN BODY IN RIGHT FOOT, INITIAL ENCOUNTER: ICD-10-CM

## 2023-09-15 PROCEDURE — 99214 OFFICE O/P EST MOD 30 MIN: CPT | Performed by: PHYSICIAN ASSISTANT

## 2023-09-15 RX ORDER — CIPROFLOXACIN 750 MG/1
750 TABLET, FILM COATED ORAL 2 TIMES DAILY
Qty: 14 TABLET | Refills: 0 | Status: SHIPPED | OUTPATIENT
Start: 2023-09-15 | End: 2023-09-22

## 2023-09-15 NOTE — PROGRESS NOTES
Patient presents with:  Foot Injury: Injury to right foot stepped on stick   (S91.331A) Puncture wound of right foot, initial encounter  (primary encounter diagnosis)  Comment:   Plan: ciprofloxacin (CIPRO) 750 MG tablet, REMOVE         FOREIGN BODY SIMPLE            (S90.921A) Foreign body in right foot, initial encounter  Comment: topical anesthetic placed.  Wound was cleaned and explored as well as debrided.    Plan: dried blood was removed from the wound after the flap of skin over the puncture wound was removed with tissue forceps.      Wound was cleaned and dressed with bacitracin.     Soak in warm soapy water twice a day and keep covered with bacitracin and bandage for the first 4-5 days, allwoing the wound to heal from the inside out.      Thereafter you may clean daily and leave open to the air at night until healed.          A pedicure would be an excellent final step of healing once the wound has completely healed over.  Samson, how about an entire spa day :)        If not improving or if condition worsens, follow up with your Primary Care Provider      31 minutes spent by me on the date of the encounter doing chart review, patient visit, and documentation       SUBJECTIVE:   Allie Ramos is a 32 year old female who presents today with a puncture wound on the bottom of her right foot sustained after she stepped on a stick last night.  The stick punctured through her shoe (croc).  She denies any fevers.  She is up-to-date on her tetanus (2/2022).      Past Medical History:   Diagnosis Date    Acquired hypothyroidism 5/12/2023    Chickenpox     Febrile seizure (H)     x1    Pregnant 4/11/2022         Current Outpatient Medications   Medication Sig Dispense Refill    Multiple Vitamins-Iron (DAILY-TRISTIAN/IRON/BETA-CAROTENE) TABS TAKE 1 TABLET BY MOUTH DAILY. (Patient not taking: Reported on 10/19/2020) 30 tablet 7     Social History     Tobacco Use    Smoking status: Never Smoker    Smokeless tobacco: Never  Used   Substance Use Topics    Alcohol use: Not on file     Family History   Problem Relation Age of Onset    Diabetes Mother     Diabetes Father          ROS:    10 point ROS of systems including Constitutional, Eyes, Respiratory, Cardiovascular, Gastroenterology, Genitourinary, Integumentary, Muscularskeletal, Psychiatric ,neurological were all negative except for pertinent positives noted in my HPI       OBJECTIVE:  /85   Pulse 74   Temp 97.7  F (36.5  C) (Oral)   Resp 14   LMP 07/26/2023   SpO2 100%   Physical Exam:  GENERAL APPEARANCE: healthy, alert and no distress  Extremities: Puncture wound on plantar aspect of right foot with dark center.  No bony abnormalities  NEURO: Normal strength and tone, sensory exam grossly normal,  normal speech and mentation  SKIN: Open wound on plantar aspect of right foot.    Procedure: Area was cleaned and prepped, topical L ET was placed for 30 minutes.  After good anesthesia was achieved, the area was cleaned again and the flap of skin was removed with tissue scissors and toothed pickup.  A small dried chunk of blood was removed from the open wound.  The wound was thereafter explored again, and was free of foreign bodies.

## 2023-09-15 NOTE — PATIENT INSTRUCTIONS
(D69.451A) Puncture wound of right foot, initial encounter  (primary encounter diagnosis)  Comment:   Plan: ciprofloxacin (CIPRO) 750 MG tablet, REMOVE         FOREIGN BODY SIMPLE            (B94.290P) Foreign body in right foot, initial encounter  Comment: topical anesthetic placed.  Wound was cleaned and explored as well as debrided.    Plan: dried blood was removed from the wound after the flap of skin over the puncture wound was removed with tissue forceps.      Wound was cleaned and dressed with bacitracin.     Soak in warm soapy water twice a day and keep covered with bacitracin and bandage for the first 4-5 days, allwoing the wound to heal from the inside out.      Thereafter you may clean daily and leave open to the air at night until healed.          A pedicure would be an excellent final step of healing once the wound has completely healed over.  Samson, how about an entire spa day :)

## 2023-11-30 ENCOUNTER — THERAPY VISIT (OUTPATIENT)
Dept: PHYSICAL THERAPY | Facility: REHABILITATION | Age: 33
End: 2023-11-30
Payer: COMMERCIAL

## 2023-11-30 DIAGNOSIS — M54.50 ACUTE BILATERAL LOW BACK PAIN WITHOUT SCIATICA: Primary | ICD-10-CM

## 2023-11-30 PROCEDURE — 97110 THERAPEUTIC EXERCISES: CPT | Mod: GP | Performed by: PHYSICAL THERAPIST

## 2024-04-12 ENCOUNTER — PATIENT OUTREACH (OUTPATIENT)
Dept: CARE COORDINATION | Facility: CLINIC | Age: 34
End: 2024-04-12
Payer: COMMERCIAL

## 2024-07-14 ENCOUNTER — HEALTH MAINTENANCE LETTER (OUTPATIENT)
Age: 34
End: 2024-07-14

## 2024-08-01 ENCOUNTER — OFFICE VISIT (OUTPATIENT)
Dept: FAMILY MEDICINE | Facility: CLINIC | Age: 34
End: 2024-08-01
Payer: COMMERCIAL

## 2024-08-01 VITALS
RESPIRATION RATE: 16 BRPM | DIASTOLIC BLOOD PRESSURE: 68 MMHG | HEART RATE: 91 BPM | OXYGEN SATURATION: 97 % | BODY MASS INDEX: 23.11 KG/M2 | WEIGHT: 152 LBS | TEMPERATURE: 98 F | SYSTOLIC BLOOD PRESSURE: 100 MMHG

## 2024-08-01 DIAGNOSIS — R07.0 THROAT PAIN: Primary | ICD-10-CM

## 2024-08-01 LAB
DEPRECATED S PYO AG THROAT QL EIA: NEGATIVE
GROUP A STREP BY PCR: NOT DETECTED

## 2024-08-01 PROCEDURE — 99213 OFFICE O/P EST LOW 20 MIN: CPT | Performed by: PHYSICIAN ASSISTANT

## 2024-08-01 PROCEDURE — 87651 STREP A DNA AMP PROBE: CPT | Performed by: PHYSICIAN ASSISTANT

## 2024-08-01 RX ORDER — ASPIRIN 81 MG/1
81 TABLET ORAL DAILY
COMMUNITY

## 2024-08-01 ASSESSMENT — ENCOUNTER SYMPTOMS
SORE THROAT: 1
COUGH: 0
RHINORRHEA: 0
FEVER: 0

## 2024-08-01 NOTE — PROGRESS NOTES
Assessment & Plan:        ICD-10-CM    1. Throat pain  R07.0 Streptococcus A Rapid Screen w/Reflex to PCR - Clinic Collect     Group A Streptococcus PCR Throat Swab            Plan/Clinical Decision Making:    Patient with acute ST, No URI symptoms, afebrile. Appears well. Mildly erythematous throat.   Strep test negative. PCR pending. Will call if positive.   Tylenol as needed. Can gargle.       Return if symptoms worsen or fail to improve, for in 5-7 days.     At the end of the encounter, I discussed results, diagnosis, medications. Discussed red flags for immediate return to clinic/ER, as well as indications for follow up if no improvement. Patient understood and agreed to plan. Patient was stable for discharge.        Gaviota Leggett PA-C on 8/1/2024 at 10:28 AM          Subjective:     HPI:    Allie is a 33 year old female who presents to clinic today for the following health issues:  Chief Complaint   Patient presents with    Throat Pain     Few days      HPI    ST for several days. Has two young children in  with strep going around.   No URI symptoms, no fever.     Pregnant: 34 weeks    Review of Systems   Constitutional:  Negative for fever.   HENT:  Positive for sore throat. Negative for congestion and rhinorrhea.    Respiratory:  Negative for cough.          Patient Active Problem List   Diagnosis    Subclinical hypothyroidism    Routine general medical examination at a health care facility    Fungal infection of toenail    Skin lesion    TMJ (temporomandibular joint syndrome)    Acute bilateral low back pain without sciatica        Past Medical History:   Diagnosis Date    Acquired hypothyroidism 5/12/2023    Chickenpox     Febrile seizure (H)     x1    Pregnant 4/11/2022       Social History     Tobacco Use    Smoking status: Never    Smokeless tobacco: Never   Substance Use Topics    Alcohol use: Not Currently     Comment: occas-quit with pregnancy             Objective:     Vitals:     08/01/24 1032   BP: 100/68   Pulse: 91   Resp: 16   Temp: 98  F (36.7  C)   SpO2: 97%   Weight: 68.9 kg (152 lb)         Physical Exam   EXAM:   Pleasant, alert, appropriate appearance. NAD.  Head Exam: Normocephalic, atraumatic.  Eye Exam:  non icteric/injection.    Ear Exam: TMs grey without bulging. Normal canals.  Normal pinna.  Nose Exam: Normal external nose.    OroPharynx Exam:  Moist mucous membranes. Mild erythema, pharynx without exudate or hypertrophy.  Neck/Thyroid Exam:  No LAD.    Chest/Respiratory Exam: CTAB.  Cardiovascular Exam: RRR. No murmur or rubs.      Results:  Results for orders placed or performed in visit on 08/01/24   Streptococcus A Rapid Screen w/Reflex to PCR - Clinic Collect     Status: Normal    Specimen: Throat; Swab   Result Value Ref Range    Group A Strep antigen Negative Negative

## 2025-01-10 ENCOUNTER — MEDICAL CORRESPONDENCE (OUTPATIENT)
Dept: HEALTH INFORMATION MANAGEMENT | Facility: CLINIC | Age: 35
End: 2025-01-10
Payer: COMMERCIAL

## 2025-01-26 ENCOUNTER — MYC MEDICAL ADVICE (OUTPATIENT)
Dept: FAMILY MEDICINE | Facility: CLINIC | Age: 35
End: 2025-01-26
Payer: COMMERCIAL

## 2025-01-26 DIAGNOSIS — M54.50 ACUTE BILATERAL LOW BACK PAIN WITHOUT SCIATICA: Primary | ICD-10-CM

## 2025-03-10 ENCOUNTER — MEDICAL CORRESPONDENCE (OUTPATIENT)
Dept: HEALTH INFORMATION MANAGEMENT | Facility: CLINIC | Age: 35
End: 2025-03-10
Payer: COMMERCIAL

## 2025-07-19 ENCOUNTER — HEALTH MAINTENANCE LETTER (OUTPATIENT)
Age: 35
End: 2025-07-19

## (undated) RX ORDER — PROPOFOL 10 MG/ML
INJECTION, EMULSION INTRAVENOUS
Status: DISPENSED
Start: 2020-06-15